# Patient Record
Sex: MALE | Race: WHITE | NOT HISPANIC OR LATINO | URBAN - METROPOLITAN AREA
[De-identification: names, ages, dates, MRNs, and addresses within clinical notes are randomized per-mention and may not be internally consistent; named-entity substitution may affect disease eponyms.]

---

## 2017-03-27 ENCOUNTER — EMERGENCY (EMERGENCY)
Facility: HOSPITAL | Age: 67
LOS: 1 days | Discharge: HOME | End: 2017-03-27
Attending: EMERGENCY MEDICINE | Admitting: EMERGENCY MEDICINE
Payer: OTHER MISCELLANEOUS

## 2017-03-27 VITALS
RESPIRATION RATE: 18 BRPM | SYSTOLIC BLOOD PRESSURE: 180 MMHG | HEART RATE: 61 BPM | OXYGEN SATURATION: 97 % | DIASTOLIC BLOOD PRESSURE: 97 MMHG | TEMPERATURE: 98 F
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
## 2017-03-27 DIAGNOSIS — Z88.0 ALLERGY STATUS TO PENICILLIN: ICD-10-CM

## 2017-03-27 DIAGNOSIS — I10 ESSENTIAL (PRIMARY) HYPERTENSION: ICD-10-CM

## 2017-03-27 DIAGNOSIS — S00.81XA ABRASION OF OTHER PART OF HEAD, INITIAL ENCOUNTER: ICD-10-CM
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
## 2017-03-27 PROCEDURE — 99053 MED SERV 10PM-8AM 24 HR FAC: CPT

## 2017-03-27 PROCEDURE — 99283 EMERGENCY DEPT VISIT LOW MDM: CPT | Mod: 25

## 2017-03-27 RX ORDER — TETANUS TOXOID, REDUCED DIPHTHERIA TOXOID AND ACELLULAR PERTUSSIS VACCINE, ADSORBED 5; 2.5; 8; 8; 2.5 [IU]/.5ML; [IU]/.5ML; UG/.5ML; UG/.5ML; UG/.5ML
0.5 SUSPENSION INTRAMUSCULAR ONCE
Qty: 0 | Refills: 0 | Status: COMPLETED | OUTPATIENT
Start: 2017-03-27 | End: 2017-03-27

## 2017-03-27 RX ORDER — BACITRACIN ZINC 500 UNIT/G
1 OINTMENT IN PACKET (EA) TOPICAL ONCE
Qty: 0 | Refills: 0 | Status: COMPLETED | OUTPATIENT
Start: 2017-03-27 | End: 2017-03-27

## 2017-03-27 RX ADMIN — TETANUS TOXOID, REDUCED DIPHTHERIA TOXOID AND ACELLULAR PERTUSSIS VACCINE, ADSORBED 0.5 MILLILITER(S): 5; 2.5; 8; 8; 2.5 SUSPENSION INTRAMUSCULAR at 00:18

## 2017-03-27 RX ADMIN — Medication 1 APPLICATION(S): at 00:18

## 2017-03-27 NOTE — ED PROVIDER NOTE - OBJECTIVE STATEMENT
patient presents with head injury. while at work patient bent over and hit the toilet, denies fall, LOC, h/a dizziness, blurry vision, neck pain

## 2017-03-27 NOTE — ED PROVIDER NOTE - MEDICAL DECISION MAKING DETAILS
patient with abrasion over the forehead after head injury at work today. bacitracin applied to wound. tdap updated. all precautions reviewed

## 2018-01-02 NOTE — ED ADULT NURSE NOTE - MODE OF DISCHARGE
History     Chief Complaint:    Palpitations     HPI   Jf Chapman is a 15 year old male with a history of Klinefelter syndrome who presents to the emergency department today for evaluation of palpitation. The patient states that since Saturday, he has noted that he had some palpitations on and off. They present themselves suddenly and without any trigger. The patient's mother states that the palpitations seem to start after the patient eats, however, the patient states that his symptoms are not due to his diet.The patient did note to some chest pain and some fevers and chills at the time of the onset of his symptoms but none since. The patient denies any changes in his diet or any other symptoms or concerns. He denies any leg swelling, recent travel, recent immobilization, diarrhea, or vomiting. Of note, the patient was recently seen at Urgent care for these symptoms and had metabolic labs and a chest xray done, which returned normal.     Allergies:  No Known Drug Allergies      Medications:    The patient is currently on no regular medications.      Past Medical History:    Klinefelter syndrome    Past Surgical History:    History reviewed. No pertinent past surgical history.     Family History:    History reviewed. No pertinent family history.      Social History:  The patient was accompanied to the ED by with family.    Review of Systems   Cardiovascular: Positive for chest pain and palpitations.   Gastrointestinal: Positive for nausea. Negative for diarrhea and vomiting.   All other systems reviewed and are negative.    Physical Exam   BP: 115/74 mmHg  Heart Rate: 114   Resp: 20  SpO2: 100% RA  Temp: 98.1  F (oral)     Orthostatics:  Supine- /70, HR 85  Sit- /88,  (dizzy)  Stand - BP 93/55,  (dizzy)     Orthostatics repeat:  Supine - /63, HR 78  Sit - /68, HR 92  Stand - /70, HR 97 (lightheaded)      Physical Exam  General: adult sized teenage male, sitting upright    Eyes: PERRL, Conjunctive within normal limits  ENT: Moist mucous membranes, oropharynx clear.   Neck: No palpable thyromegaly   CV: Normal S1S2, no murmur, rub or gallop. Regular rate and rhythm  Resp: Clear to auscultation bilaterally, no wheezes, rales or rhonchi. Normal respiratory effort.  GI: Abdomen is soft, nontender and nondistended. No palpable masses. No rebound or guarding.  MSK: No edema. Nontender. Normal active range of motion. No calf asymmetry or tenderness to palpation   Skin: Warm and dry. No rashes or lesions or ecchymoses on visible skin.  Neuro: Alert and oriented. Responds appropriately to all questions and commands. No focal findings appreciated. Normal muscle tone.  Psych: Normal mood and affect. Pleasant.   Emergency Department Course   ECG:  @ 1730  Indication: Chest pain   Vent. Rate 96 bpm. DE interval 152 ms. QRS duration 104 ms. QT/QTc 340/429 ms. P-R-T axis 42 75 17.   Normal sinus rhythm. ST elevation, consider early repolarization, pericarditis, or injury. Abnormal ECG.   Read @ 1752 by Dr. Romero.     Laboratory:  CBC:  WBC 7.6, HGB 15.7,   BMP: glucose 110 (H), otherwise WNL (Creatinine 0.87)   Troponin I: <0.015  TSH: 0.51  Myoglobin: 33    Interventions:  (1903) Normal Saline, 1 liter, IV bolus      Emergency Department Course:  Nursing notes and vitals reviewed.  (1759) I performed an exam of the patient as documented above.    EKG was done, interpretation as above.   A peripheral IV was established. Blood was drawn from the patient. This was sent for laboratory testing, findings above.    Patient reassessed. Is feeling improved, with less dizziness with standing. Denies any current palpitations or chest pain.  Findings and plan explained to the patient and family. Patient discharged home with instructions regarding supportive care, medications, and reasons to return. The importance of close follow-up was reviewed.   Impression & Plan    Medical Decision Making:  Jf  ABHAY Chapman is a 15 year old male who presents with concerns for palpitations and dizziness today. He has palpitations for a few days. This is in the setting of mild congestion and possible illness. He is denying any chest pain, although had a short duration of it. EKG did not show any signs of ischemia or injury. Clinically, his presentation does not deem consistent with pericarditis. He had normal cardiac biomarker and recent chest xray which was reported as normal. As his symptoms were not worsened and seemed most consistent with simple palpitations, repeat chest xray was not indicated. Here, he was asymptotic and did not have any abnormalities on laboratory evaluation. I did recommend out patient care and follow up with PCP in 2-3 days. Holter monitor is ordered.  At this time, I feel comfortable discharging home. He and his parents felt comfortable with this plan and he is discharged home in stable condition.      Diagnosis:    ICD-10-CM    1. Palpitations R00.2 Holter set up 48 hrs pediatric   2. Orthostatic dizziness R42        Disposition:  discharged to home    INarcisa, am serving as a scribe on 1/2/2018 at 5:59 PM to personally document services performed by Dr. Romero based on my observations and the provider's statements to me.    1/2/2018   [facility] EMERGENCY DEPARTMENT       Rebekah Romero MD  01/04/18 0031     Ambulatory

## 2019-07-08 ENCOUNTER — OUTPATIENT (OUTPATIENT)
Dept: OUTPATIENT SERVICES | Facility: HOSPITAL | Age: 69
LOS: 1 days | Discharge: HOME | End: 2019-07-08

## 2019-07-08 DIAGNOSIS — F32.3 MAJOR DEPRESSIVE DISORDER, SINGLE EPISODE, SEVERE WITH PSYCHOTIC FEATURES: ICD-10-CM

## 2019-07-09 ENCOUNTER — INPATIENT (INPATIENT)
Facility: HOSPITAL | Age: 69
LOS: 20 days | Discharge: HOME | End: 2019-07-30
Attending: PSYCHIATRY & NEUROLOGY | Admitting: PSYCHIATRY & NEUROLOGY
Payer: MEDICARE

## 2019-07-09 VITALS
OXYGEN SATURATION: 100 % | RESPIRATION RATE: 17 BRPM | HEART RATE: 67 BPM | SYSTOLIC BLOOD PRESSURE: 124 MMHG | DIASTOLIC BLOOD PRESSURE: 72 MMHG | TEMPERATURE: 98 F

## 2019-07-09 PROBLEM — I10 ESSENTIAL (PRIMARY) HYPERTENSION: Chronic | Status: ACTIVE | Noted: 2017-03-27

## 2019-07-09 NOTE — ED ADULT NURSE NOTE - NSIMPLEMENTINTERV_GEN_ALL_ED
Implemented All Universal Safety Interventions:  [place] to call system. Call bell, personal items and telephone within reach. Instruct patient to call for assistance. Room bathroom lighting operational. Non-slip footwear when patient is off stretcher. Physically safe environment: no spills, clutter or unnecessary equipment. Stretcher in lowest position, wheels locked, appropriate side rails in place.

## 2019-07-09 NOTE — ED ADULT NURSE NOTE - CHIEF COMPLAINT QUOTE
Pt c/o SI. Pt states he has had abdominal pain and 37lb weight loss over the past 4 months. Pt states he has seen several doctors and they don't know what's wrong. As per pt wife pt states that he wanted to "jump out the window" [facility] depressive disorder (compliant with medication)

## 2019-07-09 NOTE — ED ADULT NURSE NOTE - OBJECTIVE STATEMENT
Received pt in spot 20A, brought in by family for suicidal thoughts and weight loss of 37 lbs. over past few months.  Pt AAOx3, respirations even and unlabored.  Pt denies N/V/D, denies urinary symptoms.  Pt appears comfortable, but endorses mild abdominal pain.  Pt placed on 1:1 observation for active suicidal thoughts to "jump out the window."  Pt ambulatory from triage to spot 20A.  Wife at bedside, awaiting MD evaluation.

## 2019-07-09 NOTE — ED ADULT TRIAGE NOTE - CHIEF COMPLAINT QUOTE
Pt c/o SI. Pt states he has had abdominal pain and 37lb weight loss over the past 4 months. Pt states he has seen several doctors and they don't know what's wrong. As per pt wife pt states that he wanted to "jump out the window" [facility] depressive disorder (compliant with medication)

## 2019-07-10 DIAGNOSIS — F33.2 MAJOR DEPRESSIVE DISORDER, RECURRENT SEVERE WITHOUT PSYCHOTIC FEATURES: ICD-10-CM

## 2019-07-10 DIAGNOSIS — F41.9 ANXIETY DISORDER, UNSPECIFIED: ICD-10-CM

## 2019-07-10 DIAGNOSIS — Z96.641 PRESENCE OF RIGHT ARTIFICIAL HIP JOINT: Chronic | ICD-10-CM

## 2019-07-10 DIAGNOSIS — Q74.2 OTHER CONGENITAL MALFORMATIONS OF LOWER LIMB(S), INCLUDING PELVIC GIRDLE: Chronic | ICD-10-CM

## 2019-07-10 DIAGNOSIS — F33.9 MAJOR DEPRESSIVE DISORDER, RECURRENT, UNSPECIFIED: ICD-10-CM

## 2019-07-10 LAB
ALBUMIN SERPL ELPH-MCNC: 3.9 G/DL — SIGNIFICANT CHANGE UP (ref 3.3–5)
ALP SERPL-CCNC: 31 U/L — LOW (ref 40–120)
ALT FLD-CCNC: 18 U/L — SIGNIFICANT CHANGE UP (ref 4–41)
AMPHET UR-MCNC: NEGATIVE — SIGNIFICANT CHANGE UP
ANION GAP SERPL CALC-SCNC: 9 MMO/L — SIGNIFICANT CHANGE UP (ref 7–14)
APAP SERPL-MCNC: < 15 UG/ML — LOW (ref 15–25)
APPEARANCE UR: CLEAR — SIGNIFICANT CHANGE UP
AST SERPL-CCNC: 27 U/L — SIGNIFICANT CHANGE UP (ref 4–40)
BARBITURATES UR SCN-MCNC: NEGATIVE — SIGNIFICANT CHANGE UP
BASOPHILS # BLD AUTO: 0.03 K/UL — SIGNIFICANT CHANGE UP (ref 0–0.2)
BASOPHILS NFR BLD AUTO: 0.4 % — SIGNIFICANT CHANGE UP (ref 0–2)
BENZODIAZ UR-MCNC: NEGATIVE — SIGNIFICANT CHANGE UP
BILIRUB SERPL-MCNC: 0.6 MG/DL — SIGNIFICANT CHANGE UP (ref 0.2–1.2)
BILIRUB UR-MCNC: NEGATIVE — SIGNIFICANT CHANGE UP
BLOOD UR QL VISUAL: NEGATIVE — SIGNIFICANT CHANGE UP
BUN SERPL-MCNC: 12 MG/DL — SIGNIFICANT CHANGE UP (ref 7–23)
CALCIUM SERPL-MCNC: 9.2 MG/DL — SIGNIFICANT CHANGE UP (ref 8.4–10.5)
CANNABINOIDS UR-MCNC: NEGATIVE — SIGNIFICANT CHANGE UP
CHLORIDE SERPL-SCNC: 100 MMOL/L — SIGNIFICANT CHANGE UP (ref 98–107)
CO2 SERPL-SCNC: 25 MMOL/L — SIGNIFICANT CHANGE UP (ref 22–31)
COCAINE METAB.OTHER UR-MCNC: NEGATIVE — SIGNIFICANT CHANGE UP
COLOR SPEC: COLORLESS — SIGNIFICANT CHANGE UP
CREAT SERPL-MCNC: 0.98 MG/DL — SIGNIFICANT CHANGE UP (ref 0.5–1.3)
EOSINOPHIL # BLD AUTO: 0.16 K/UL — SIGNIFICANT CHANGE UP (ref 0–0.5)
EOSINOPHIL NFR BLD AUTO: 2.3 % — SIGNIFICANT CHANGE UP (ref 0–6)
ETHANOL BLD-MCNC: < 10 MG/DL — SIGNIFICANT CHANGE UP
GLUCOSE SERPL-MCNC: 94 MG/DL — SIGNIFICANT CHANGE UP (ref 70–99)
GLUCOSE UR-MCNC: NEGATIVE — SIGNIFICANT CHANGE UP
HCT VFR BLD CALC: 36.4 % — LOW (ref 39–50)
HGB BLD-MCNC: 11.8 G/DL — LOW (ref 13–17)
IMM GRANULOCYTES NFR BLD AUTO: 0.1 % — SIGNIFICANT CHANGE UP (ref 0–1.5)
KETONES UR-MCNC: NEGATIVE — SIGNIFICANT CHANGE UP
LEUKOCYTE ESTERASE UR-ACNC: NEGATIVE — SIGNIFICANT CHANGE UP
LYMPHOCYTES # BLD AUTO: 1.49 K/UL — SIGNIFICANT CHANGE UP (ref 1–3.3)
LYMPHOCYTES # BLD AUTO: 21.2 % — SIGNIFICANT CHANGE UP (ref 13–44)
MCHC RBC-ENTMCNC: 29.4 PG — SIGNIFICANT CHANGE UP (ref 27–34)
MCHC RBC-ENTMCNC: 32.4 % — SIGNIFICANT CHANGE UP (ref 32–36)
MCV RBC AUTO: 90.5 FL — SIGNIFICANT CHANGE UP (ref 80–100)
METHADONE UR-MCNC: NEGATIVE — SIGNIFICANT CHANGE UP
MONOCYTES # BLD AUTO: 0.68 K/UL — SIGNIFICANT CHANGE UP (ref 0–0.9)
MONOCYTES NFR BLD AUTO: 9.7 % — SIGNIFICANT CHANGE UP (ref 2–14)
NEUTROPHILS # BLD AUTO: 4.66 K/UL — SIGNIFICANT CHANGE UP (ref 1.8–7.4)
NEUTROPHILS NFR BLD AUTO: 66.3 % — SIGNIFICANT CHANGE UP (ref 43–77)
NITRITE UR-MCNC: NEGATIVE — SIGNIFICANT CHANGE UP
NRBC # FLD: 0 K/UL — SIGNIFICANT CHANGE UP (ref 0–0)
OPIATES UR-MCNC: NEGATIVE — SIGNIFICANT CHANGE UP
OXYCODONE UR-MCNC: NEGATIVE — SIGNIFICANT CHANGE UP
PCP UR-MCNC: NEGATIVE — SIGNIFICANT CHANGE UP
PH UR: 7 — SIGNIFICANT CHANGE UP (ref 5–8)
PLATELET # BLD AUTO: 201 K/UL — SIGNIFICANT CHANGE UP (ref 150–400)
PMV BLD: 10.3 FL — SIGNIFICANT CHANGE UP (ref 7–13)
POTASSIUM SERPL-MCNC: 4.5 MMOL/L — SIGNIFICANT CHANGE UP (ref 3.5–5.3)
POTASSIUM SERPL-SCNC: 4.5 MMOL/L — SIGNIFICANT CHANGE UP (ref 3.5–5.3)
PROT SERPL-MCNC: 6.6 G/DL — SIGNIFICANT CHANGE UP (ref 6–8.3)
PROT UR-MCNC: NEGATIVE — SIGNIFICANT CHANGE UP
RBC # BLD: 4.02 M/UL — LOW (ref 4.2–5.8)
RBC # FLD: 12.9 % — SIGNIFICANT CHANGE UP (ref 10.3–14.5)
RBC CASTS # UR COMP ASSIST: SIGNIFICANT CHANGE UP (ref 0–?)
SALICYLATES SERPL-MCNC: < 5 MG/DL — LOW (ref 15–30)
SODIUM SERPL-SCNC: 134 MMOL/L — LOW (ref 135–145)
SP GR SPEC: 1 — LOW (ref 1–1.04)
TSH SERPL-MCNC: 1.69 UIU/ML — SIGNIFICANT CHANGE UP (ref 0.27–4.2)
UROBILINOGEN FLD QL: NORMAL — SIGNIFICANT CHANGE UP
WBC # BLD: 7.03 K/UL — SIGNIFICANT CHANGE UP (ref 3.8–10.5)
WBC # FLD AUTO: 7.03 K/UL — SIGNIFICANT CHANGE UP (ref 3.8–10.5)
WBC UR QL: SIGNIFICANT CHANGE UP (ref 0–?)

## 2019-07-10 PROCEDURE — 74177 CT ABD & PELVIS W/CONTRAST: CPT | Mod: 26

## 2019-07-10 PROCEDURE — 99285 EMERGENCY DEPT VISIT HI MDM: CPT

## 2019-07-10 PROCEDURE — 71046 X-RAY EXAM CHEST 2 VIEWS: CPT | Mod: 26

## 2019-07-10 RX ORDER — TRAZODONE HCL 50 MG
25 TABLET ORAL AT BEDTIME
Refills: 0 | Status: DISCONTINUED | OUTPATIENT
Start: 2019-07-10 | End: 2019-07-30

## 2019-07-10 RX ORDER — ATORVASTATIN CALCIUM 80 MG/1
20 TABLET, FILM COATED ORAL AT BEDTIME
Refills: 0 | Status: DISCONTINUED | OUTPATIENT
Start: 2019-07-10 | End: 2019-07-30

## 2019-07-10 RX ORDER — LOSARTAN POTASSIUM 100 MG/1
100 TABLET, FILM COATED ORAL DAILY
Refills: 0 | Status: DISCONTINUED | OUTPATIENT
Start: 2019-07-10 | End: 2019-07-10

## 2019-07-10 RX ORDER — SUCRALFATE 1 G
1 TABLET ORAL
Refills: 0 | Status: DISCONTINUED | OUTPATIENT
Start: 2019-07-10 | End: 2019-07-10

## 2019-07-10 RX ORDER — ACETAMINOPHEN 500 MG
975 TABLET ORAL ONCE
Refills: 0 | Status: COMPLETED | OUTPATIENT
Start: 2019-07-10 | End: 2019-07-10

## 2019-07-10 RX ORDER — SODIUM CHLORIDE 9 MG/ML
1000 INJECTION INTRAMUSCULAR; INTRAVENOUS; SUBCUTANEOUS ONCE
Refills: 0 | Status: COMPLETED | OUTPATIENT
Start: 2019-07-10 | End: 2019-07-10

## 2019-07-10 RX ORDER — PANTOPRAZOLE SODIUM 20 MG/1
40 TABLET, DELAYED RELEASE ORAL
Refills: 0 | Status: DISCONTINUED | OUTPATIENT
Start: 2019-07-10 | End: 2019-07-10

## 2019-07-10 RX ORDER — POLYETHYLENE GLYCOL 3350 17 G/17G
17 POWDER, FOR SOLUTION ORAL ONCE
Refills: 0 | Status: COMPLETED | OUTPATIENT
Start: 2019-07-10 | End: 2019-07-10

## 2019-07-10 RX ORDER — MIRTAZAPINE 45 MG/1
15 TABLET, ORALLY DISINTEGRATING ORAL AT BEDTIME
Refills: 0 | Status: DISCONTINUED | OUTPATIENT
Start: 2019-07-10 | End: 2019-07-16

## 2019-07-10 RX ORDER — PANTOPRAZOLE SODIUM 20 MG/1
40 TABLET, DELAYED RELEASE ORAL ONCE
Refills: 0 | Status: COMPLETED | OUTPATIENT
Start: 2019-07-10 | End: 2019-07-10

## 2019-07-10 RX ADMIN — Medication 1 GRAM(S): at 10:19

## 2019-07-10 RX ADMIN — Medication 975 MILLIGRAM(S): at 10:47

## 2019-07-10 RX ADMIN — POLYETHYLENE GLYCOL 3350 17 GRAM(S): 17 POWDER, FOR SOLUTION ORAL at 22:36

## 2019-07-10 RX ADMIN — LOSARTAN POTASSIUM 100 MILLIGRAM(S): 100 TABLET, FILM COATED ORAL at 10:55

## 2019-07-10 RX ADMIN — PANTOPRAZOLE SODIUM 40 MILLIGRAM(S): 20 TABLET, DELAYED RELEASE ORAL at 15:14

## 2019-07-10 RX ADMIN — SODIUM CHLORIDE 1000 MILLILITER(S): 9 INJECTION INTRAMUSCULAR; INTRAVENOUS; SUBCUTANEOUS at 01:26

## 2019-07-10 RX ADMIN — MIRTAZAPINE 15 MILLIGRAM(S): 45 TABLET, ORALLY DISINTEGRATING ORAL at 21:14

## 2019-07-10 RX ADMIN — PANTOPRAZOLE SODIUM 40 MILLIGRAM(S): 20 TABLET, DELAYED RELEASE ORAL at 10:19

## 2019-07-10 RX ADMIN — Medication 975 MILLIGRAM(S): at 06:16

## 2019-07-10 NOTE — ED BEHAVIORAL HEALTH ASSESSMENT NOTE - RISK ASSESSMENT
At this time, the Pt is at high risk for acute suicidality as he has previously expressed active SI with a lethal plan of jumping from a window.  The Pt lives alone, has limited social support; mood component is being compounded by worsening somatic pain.  Pt is feeling hopeless/helpless regarding the situation.  Hence, is a threat to self.

## 2019-07-10 NOTE — ED BEHAVIORAL HEALTH ASSESSMENT NOTE - PAST PSYCHOTROPIC MEDICATION
Lexapro 10mg daily, prozac 10mg daily, Trazodone 50-100mg HS PRN Desipramine (unrecalled dose), Lexapro 10mg daily, Prozac 10mg daily, Trazodone 50-100mg HS PRN

## 2019-07-10 NOTE — ED ADULT NURSE REASSESSMENT NOTE - NS ED NURSE REASSESS COMMENT FT1
Patient received to , report received from main ED RN: Patient is to be seen by psych MD due to recent depression x 1 week, hx depression, 1 hospitalization in 1989 for depression. Patient also has history of taking psych medications to help with depression and sleep but cannot state clearly which medications. He is suicidal "my head hurts so much it makes me want to jump out the window like I almost did this morning." Patient denies headache at present. Patient's voice is very mumbled making it very difficult to understand. He is not homicidal. He believes he is seeing the psychiatrist to help his "headaches and dizziness." Speech is jumbled. Patient makes poor eye contact, flat affect. Admits to feeling anxious at present, but very back and forth with being actively suicidal now that his headache is gone. No acute distress. Denies drug & alcohol use, pending possible admission to [facility]. Will monitor

## 2019-07-10 NOTE — ED PROVIDER NOTE - ATTENDING CONTRIBUTION TO CARE
HPI: 67 y/o male with past medical history significant for depression, hypertension, GERD/hiatal hernia, esophagitis, vitamin D def presents with suicidal ideation.  History obtained from patient's sister at bedside and pt himself.  Sister states that the patient has been healthy until 4 months ago when he started to experience abdominal pain, stomach cramps, hallucinations, insomnia, anxiety, dizziness and recently today has reported suicidal ideation. pt reports no gun access. No previous SI. Denies HI and hallucinations. pt reports the reason why he is SI is because he is sick and nobody can figure out what is wrong with him. in terms of weight loss pt denies night sweats. Reports not eating much due to decreased appetite.   EXAM: NAD, eye EOMI/PERRL, heart RRR, lungs ctab, abd without masses, cachetic, MSK otherwise normal with normal pulses. Skin without rash or signs trauma or track marks.   MDM: concern for weight loss but pt has decreased appetite, not eating much has EGD 1 month ago showing esophagitis and gastritis and hiatal hernia. Possible cuase. No nightsweats or othe signs/symptoms of cancer. Reports has seen multiple MDs for work up and nobdy can figure out what is wrong. Will call PMD Mumtaz Duran, obtain infectious work up and reassess. if medically cleared in ED, will consult Psych for SI.

## 2019-07-10 NOTE — ED BEHAVIORAL HEALTH ASSESSMENT NOTE - DESCRIPTION
been calm and cooperative    Vital Signs Last 24 Hrs  T(C): 36.4 (10 Jul 2019 05:35), Max: 36.6 (09 Jul 2019 22:40)  T(F): 97.6 (10 Jul 2019 05:35), Max: 97.8 (09 Jul 2019 22:40)  HR: 67 (10 Jul 2019 05:35) (53 - 67)  BP: 156/86 (10 Jul 2019 05:35) (124/72 - 156/86)  BP(mean): --  RR: 18 (10 Jul 2019 05:35) (17 - 18)  SpO2: 100% (10 Jul 2019 05:35) (100% - 100%) GERD/ hiatal hernia, HTN, Hypercholesteremia  and Vitamin D deficiency; s/p right hip replacement single, no children, currently no into a relationship retired

## 2019-07-10 NOTE — ED PROVIDER NOTE - CLINICAL SUMMARY MEDICAL DECISION MAKING FREE TEXT BOX
69 y/o male with past medical history significant for depression, hypertension, GERD, vitamin D presents with suicidal ideation. Pt has had weight loss but has been seen and worked up by multiple MDs for this. PMD aware and sent him here for psych eval. Pt has no complaints other than not eating well. Most likely secondary to gerd/gastritis and hiatal hernia. Will obtain labs, imaging and provide IVF and reassess.

## 2019-07-10 NOTE — ED BEHAVIORAL HEALTH ASSESSMENT NOTE - HPI (INCLUDE ILLNESS QUALITY, SEVERITY, DURATION, TIMING, CONTEXT, MODIFYING FACTORS, ASSOCIATED SIGNS AND SYMPTOMS)
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  He has a long hx of depression with 1 remote hospitalization at [facility] in 1989; denied hx of self-injurious behavior/SA and no substance abuse hx.      Pt walked into the ED accompanied by his sister complaining of worsening abdominal pain and headache.  Whilst at the triage, the sister reported that has Pt's symptoms were getting worse and MDs that he previously consulted were unable to determine the cause and offer alleviation of symptoms, Pt felt hopeless, frustrated to a point that he verbalized to her that he wanted to jump out of the window of his apartment.  sister stated that this is the first time Pt made such statements and she began to worry for his safety.      Pt claims he has been experiencing worsening abdominal pain for the past 4 months.  He has been to numerous MDs, tests were done but all showed no significant pathology apart from GERD and hiatal hernia.  He reports being increasingly distraught by this to a point that he has  become increasingly depressed over the past > 2 months.  Pt says he feels hopeless/helpless as there is inability to address and control his symptoms.  Lately, he also began to experience generalized throbbing headaches which at times, were unbearable.  Yesterday, he was awakened from his sleep due to worsening headache.  He told his sister that he would be better off dead if the symptoms continued.      Pt reports of a long hx of depression.  he described his depression as having sad mood daily + anhedonia.  He has not seen a psychiatrist for a long time but had been prescribed by his PCP with Lexapro 10mg daily, Prozac 10mg daily and Trazodone 50-100mg HS PRN.  He says he stopped taking these meds as these were not working towards alleviating his depression.  Lately (for the past 2 months), he reports that depression has worsened; he has low energy level, impaired conc, early/middle/terminal insomnia as well as not eating/drinking much out of fear that he will precipitate another acute abdominal pain.  As a result, he has already lost 37 lbs in 4 months.  Pt began to worry regarding his current medical condition and wonders if he will ever get well again.  He denied experiencing any other specific anxiety disorder symptoms. Pt denies hypomanic/manic symptoms.  He is not paranoid and he denies experiencing any AH/VH    Collateral from sister, Ms Adelina Barry [phone]: reported that Pt has long hx of depression; used to be on desipramine and had been taking this medication since after he got discharged from [facility] in 1989.  Had apparently been doing well and hence, stopped taking the medication x 2 yrs ago.  About 4 months ago, the Pt began to experience severe epigastric pain accompanied by nausea, dizziness and headaches.  Pt sought consult with various MDs/specialist.  Pt verbalized frustration as nobody seemed to figure out what is causing the symptoms.  Pt was prescribed meds but these meds did not help control the symptoms.  Around 2 months back, Pt started experiencing hallucinations in the form of visual and auditory hallucinations (neighbors throwing bowling balls on the floor and Pt hearing it from his apartment unit below).  Sister feels that the hallucinations may be due to the multiple meds that Pt had earlier been prescribed.  Sister is most worried since yesterday as Pt made statement to her that he wanted to "jump out of the window" because Pt could not bear the pains anymore.

## 2019-07-10 NOTE — ED PROVIDER NOTE - CONSTITUTIONAL, MLM
normal... Well appearing, cachetic, awake, alert, oriented to person, place, time/situation and in no apparent distress.

## 2019-07-10 NOTE — ED PROVIDER NOTE - OBJECTIVE STATEMENT
69 y/o male with past medical history significant for depression, hypertension, GERD, vitamin D presents with suicidal ideation.  History obtained from patient's sister at bedside.  Sister states that the patient has been healthy until 4 months ago when he started to experience abdominal pain, stomach cramps, hallucinations, insomnia, anxiety, dizziness and recently today has reported suicidal ideation.

## 2019-07-10 NOTE — ED BEHAVIORAL HEALTH ASSESSMENT NOTE - OTHER
sister CVM abdominal pain and headache in bed; did not attempt to evaluate abdominal pain, headaches

## 2019-07-10 NOTE — ED ADULT NURSE REASSESSMENT NOTE - NS ED NURSE REASSESS COMMENT FT1
Report given to PIYUSH Nava. Pt stable, going to . IV removed, pt in no distress Report given to PIYUSH Nava. Pt stable, going to Behavioral health. IV removed, pt in no distress

## 2019-07-10 NOTE — ED BEHAVIORAL HEALTH ASSESSMENT NOTE - SUICIDE PROTECTIVE FACTORS
Fear of death or dying due to pain/suffering/High spirituality/Positive therapeutic relationships/Identifies reasons for living

## 2019-07-10 NOTE — ED PROVIDER NOTE - PMH
Depression, unspecified depression type    GERD (gastroesophageal reflux disease)    HTN (hypertension)    Hypercholesteremia    Vitamin D deficiency

## 2019-07-10 NOTE — ED PROVIDER NOTE - PROGRESS NOTE DETAILS
Spoke to Dr hernandez, would like to see if pt has reason for admission, if not, Dr hernandez wants pt to see psych for possible SI/ placement. Pt medically cleared, labs unremarkable, CT unremarkable other than possible bladder obstruction from large prostate but is able to provide urine and has no complaints. Will consult Psych for SI.

## 2019-07-11 PROCEDURE — 99223 1ST HOSP IP/OBS HIGH 75: CPT

## 2019-07-11 PROCEDURE — 99222 1ST HOSP IP/OBS MODERATE 55: CPT

## 2019-07-11 RX ORDER — SIMETHICONE 80 MG/1
80 TABLET, CHEWABLE ORAL ONCE
Refills: 0 | Status: COMPLETED | OUTPATIENT
Start: 2019-07-11 | End: 2019-07-11

## 2019-07-11 RX ORDER — FINASTERIDE 5 MG/1
5 TABLET, FILM COATED ORAL DAILY
Refills: 0 | Status: DISCONTINUED | OUTPATIENT
Start: 2019-07-11 | End: 2019-07-30

## 2019-07-11 RX ORDER — SUCRALFATE 1 G
1 TABLET ORAL
Refills: 0 | Status: DISCONTINUED | OUTPATIENT
Start: 2019-07-11 | End: 2019-07-30

## 2019-07-11 RX ORDER — LOSARTAN POTASSIUM 100 MG/1
100 TABLET, FILM COATED ORAL DAILY
Refills: 0 | Status: DISCONTINUED | OUTPATIENT
Start: 2019-07-11 | End: 2019-07-30

## 2019-07-11 RX ORDER — PANTOPRAZOLE SODIUM 20 MG/1
40 TABLET, DELAYED RELEASE ORAL
Refills: 0 | Status: DISCONTINUED | OUTPATIENT
Start: 2019-07-11 | End: 2019-07-30

## 2019-07-11 RX ORDER — QUETIAPINE FUMARATE 200 MG/1
25 TABLET, FILM COATED ORAL
Refills: 0 | Status: DISCONTINUED | OUTPATIENT
Start: 2019-07-11 | End: 2019-07-14

## 2019-07-11 RX ADMIN — Medication 0.5 MILLIGRAM(S): at 16:30

## 2019-07-11 RX ADMIN — Medication 1 GRAM(S): at 08:18

## 2019-07-11 RX ADMIN — LOSARTAN POTASSIUM 100 MILLIGRAM(S): 100 TABLET, FILM COATED ORAL at 08:18

## 2019-07-11 RX ADMIN — QUETIAPINE FUMARATE 25 MILLIGRAM(S): 200 TABLET, FILM COATED ORAL at 20:51

## 2019-07-11 RX ADMIN — QUETIAPINE FUMARATE 25 MILLIGRAM(S): 200 TABLET, FILM COATED ORAL at 10:57

## 2019-07-11 RX ADMIN — ATORVASTATIN CALCIUM 20 MILLIGRAM(S): 80 TABLET, FILM COATED ORAL at 20:51

## 2019-07-11 RX ADMIN — Medication 0.5 MILLIGRAM(S): at 08:32

## 2019-07-11 RX ADMIN — FINASTERIDE 5 MILLIGRAM(S): 5 TABLET, FILM COATED ORAL at 20:51

## 2019-07-11 RX ADMIN — Medication 1 GRAM(S): at 16:29

## 2019-07-11 RX ADMIN — PANTOPRAZOLE SODIUM 40 MILLIGRAM(S): 20 TABLET, DELAYED RELEASE ORAL at 08:18

## 2019-07-11 RX ADMIN — Medication 1 GRAM(S): at 20:51

## 2019-07-11 RX ADMIN — SIMETHICONE 80 MILLIGRAM(S): 80 TABLET, CHEWABLE ORAL at 21:23

## 2019-07-11 RX ADMIN — MIRTAZAPINE 15 MILLIGRAM(S): 45 TABLET, ORALLY DISINTEGRATING ORAL at 20:51

## 2019-07-11 NOTE — CONSULT NOTE ADULT - SUBJECTIVE AND OBJECTIVE BOX
HPI:   Pato Cota is a 68 year old gentlemen with a past medical history of HTN and GERD who was sent to [facility] on  from the ED b/o suicide ideation. The patient has been experiencing unexplained weight loss w/decreased appetite and abdominal pain over the past 4 months. CT scan in the ED yesterday shows stool burden, BPH and urinary retention. The patient states that he hasn't moved his bowels in two days, but is able to urinate regularly.       PAST MEDICAL & SURGICAL HISTORY:  Hypercholesteremia  Depression, unspecified depression type  GERD (gastroesophageal reflux disease)  Vitamin D deficiency  HTN (hypertension)  Abnormality of femur  History of right hip replacement      Review of Systems:   CONSTITUTIONAL: No fever, or fatigue. +Weight loss  EYES: No eye pain, visual disturbances, or discharge  ENMT:  No difficulty hearing, tinnitus, vertigo; No sinus or throat pain  NECK: No pain or stiffness  RESPIRATORY: No cough, wheezing, chills or hemoptysis; No shortness of breath  CARDIOVASCULAR: No chest pain, palpitations, dizziness, or leg swelling  GASTROINTESTINAL:+abdominal pain  GENITOURINARY: No dysuria, frequency, hematuria, or incontinence  NEUROLOGICAL: No headaches, memory loss, loss of strength, numbness, or tremors  SKIN: No itching, burning, rashes, or lesions   LYMPH NODES: No enlarged glands  ENDOCRINE: No heat or cold intolerance; No hair loss  MUSCULOSKELETAL: No joint pain or swelling; No muscle, back, or extremity pain  HEME/LYMPH: No easy bruising, or bleeding gums  ALLERY AND IMMUNOLOGIC: No hives or eczema    Allergies    penicillins (Unknown)    Intolerances        Social History:   Used to work for the [employer], denies drinking, smoking or drug use    FAMILY HISTORY:  No pertinent history    MEDICATIONS  (STANDING):  atorvastatin 20 milliGRAM(s) Oral at bedtime  losartan 100 milliGRAM(s) Oral daily  mirtazapine 15 milliGRAM(s) Oral at bedtime  pantoprazole    Tablet 40 milliGRAM(s) Oral before breakfast  QUEtiapine 25 milliGRAM(s) Oral two times a day  sucralfate 1 Gram(s) Oral four times a day    MEDICATIONS  (PRN):  LORazepam     Tablet 0.5 milliGRAM(s) Oral every 6 hours PRN Agitation  traZODone 25 milliGRAM(s) Oral at bedtime PRN insomnia      Vital Signs Last 24 Hrs  T(C): 36.9 (2019 15:46), Max: 36.9 (2019 15:46)  T(F): 98.4 (2019 15:46), Max: 98.4 (2019 15:46)  HR: --  BP: --  BP(mean): --  RR: --  SpO2: --  CAPILLARY BLOOD GLUCOSE            PHYSICAL EXAM:  GENERAL: NAD, well-developed  HEAD:  Atraumatic, Normocephalic  EYES: EOMI, conjunctiva and sclera clear  NECK: Supple, No JVD  CHEST/LUNG: Clear to auscultation bilaterally; No wheeze  HEART: Regular rate and rhythm; No murmurs, rubs, or gallops  ABDOMEN: Soft, Nontender, Nondistended; Bowel sounds present  EXTREMITIES:  2+ Peripheral Pulses, No clubbing, cyanosis, or edema  NEUROLOGY: non-focal  SKIN: No rashes or lesions    LABS:                        11.8   7.03  )-----------( 201      ( 10 Jul 2019 01:22 )             36.4     07-10    134<L>  |  100  |  12  ----------------------------<  94  4.5   |  25  |  0.98    Ca    9.2      10 Jul 2019 01:22    TPro  6.6  /  Alb  3.9  /  TBili  0.6  /  DBili  x   /  AST  27  /  ALT  18  /  AlkPhos  31<L>  07-10          Urinalysis Basic - ( 10 Jul 2019 01:40 )    Color: COLORLESS / Appearance: CLEAR / S.004 / pH: 7.0  Gluc: NEGATIVE / Ketone: NEGATIVE  / Bili: NEGATIVE / Urobili: NORMAL   Blood: NEGATIVE / Protein: NEGATIVE / Nitrite: NEGATIVE   Leuk Esterase: NEGATIVE / RBC: 0-2 / WBC 0-2   Sq Epi: x / Non Sq Epi: x / Bacteria: x        EKG(personally reviewed):    RADIOLOGY & ADDITIONAL TESTS:    Imaging Personally Reviewed:    CT in the ED shows constipation, distended bladder and bph    Consultant(s) Notes Reviewed:      Care Discussed with Consultants/Other Providers:

## 2019-07-11 NOTE — CONSULT NOTE ADULT - ASSESSMENT
68M  PMH of HTN and GERD who was sent to [facility] on July 10th from the ED b/o suicide ideation. The patient has been experiencing unexplained weight loss w/decreased appetite and abdominal pain over the past 4 months. CT scan in the ED yesterday shows stool burden, BPH and urinary retention. The patient states that he hasn't moved his bowels in two days, but is able to urinate regularly.     Trial of void done at [facility], patient able to urinate, no signs of retention.     #Generalized abdominal pain  -Could be from constipation and occasional urinary retention from possible BPH  Will recommend starting the patient on stool softner/laxatives PRN. -For now Dulcolax ordered PRN  Finasteride ordered for likely BPH  Outpatient follow up with urology for BPH     #GERD  Continue protonix    #HTN  Continue losartan 100 mg daily  Blood pressure will be monitored and if needed medications will be adjusted accordingly    #SI  Continue workup, management and treatment as per primary/psychiatry team

## 2019-07-12 PROCEDURE — 99232 SBSQ HOSP IP/OBS MODERATE 35: CPT

## 2019-07-12 RX ORDER — CHOLECALCIFEROL (VITAMIN D3) 125 MCG
1000 CAPSULE ORAL DAILY
Refills: 0 | Status: DISCONTINUED | OUTPATIENT
Start: 2019-07-12 | End: 2019-07-30

## 2019-07-12 RX ORDER — SIMETHICONE 80 MG/1
80 TABLET, CHEWABLE ORAL ONCE
Refills: 0 | Status: COMPLETED | OUTPATIENT
Start: 2019-07-12 | End: 2019-07-12

## 2019-07-12 RX ADMIN — QUETIAPINE FUMARATE 25 MILLIGRAM(S): 200 TABLET, FILM COATED ORAL at 20:46

## 2019-07-12 RX ADMIN — SIMETHICONE 80 MILLIGRAM(S): 80 TABLET, CHEWABLE ORAL at 06:56

## 2019-07-12 RX ADMIN — Medication 1 GRAM(S): at 07:12

## 2019-07-12 RX ADMIN — ATORVASTATIN CALCIUM 20 MILLIGRAM(S): 80 TABLET, FILM COATED ORAL at 20:46

## 2019-07-12 RX ADMIN — PANTOPRAZOLE SODIUM 40 MILLIGRAM(S): 20 TABLET, DELAYED RELEASE ORAL at 07:12

## 2019-07-12 RX ADMIN — MIRTAZAPINE 15 MILLIGRAM(S): 45 TABLET, ORALLY DISINTEGRATING ORAL at 20:46

## 2019-07-12 RX ADMIN — QUETIAPINE FUMARATE 25 MILLIGRAM(S): 200 TABLET, FILM COATED ORAL at 09:07

## 2019-07-12 RX ADMIN — Medication 1 GRAM(S): at 12:38

## 2019-07-12 RX ADMIN — Medication 1 GRAM(S): at 20:46

## 2019-07-12 RX ADMIN — FINASTERIDE 5 MILLIGRAM(S): 5 TABLET, FILM COATED ORAL at 09:07

## 2019-07-12 RX ADMIN — LOSARTAN POTASSIUM 100 MILLIGRAM(S): 100 TABLET, FILM COATED ORAL at 09:07

## 2019-07-12 RX ADMIN — Medication 1 GRAM(S): at 16:46

## 2019-07-12 RX ADMIN — Medication 0.5 MILLIGRAM(S): at 14:50

## 2019-07-13 PROCEDURE — 99232 SBSQ HOSP IP/OBS MODERATE 35: CPT

## 2019-07-13 RX ORDER — ACETAMINOPHEN 500 MG
650 TABLET ORAL EVERY 6 HOURS
Refills: 0 | Status: DISCONTINUED | OUTPATIENT
Start: 2019-07-13 | End: 2019-07-30

## 2019-07-13 RX ADMIN — Medication 1000 UNIT(S): at 09:43

## 2019-07-13 RX ADMIN — QUETIAPINE FUMARATE 25 MILLIGRAM(S): 200 TABLET, FILM COATED ORAL at 20:49

## 2019-07-13 RX ADMIN — Medication 0.5 MILLIGRAM(S): at 21:32

## 2019-07-13 RX ADMIN — Medication 1 GRAM(S): at 17:14

## 2019-07-13 RX ADMIN — ATORVASTATIN CALCIUM 20 MILLIGRAM(S): 80 TABLET, FILM COATED ORAL at 20:49

## 2019-07-13 RX ADMIN — Medication 650 MILLIGRAM(S): at 20:49

## 2019-07-13 RX ADMIN — Medication 1 GRAM(S): at 09:43

## 2019-07-13 RX ADMIN — Medication 1 GRAM(S): at 20:49

## 2019-07-13 RX ADMIN — FINASTERIDE 5 MILLIGRAM(S): 5 TABLET, FILM COATED ORAL at 09:43

## 2019-07-13 RX ADMIN — Medication 1 TABLET(S): at 09:43

## 2019-07-13 RX ADMIN — MIRTAZAPINE 15 MILLIGRAM(S): 45 TABLET, ORALLY DISINTEGRATING ORAL at 20:49

## 2019-07-13 RX ADMIN — Medication 650 MILLIGRAM(S): at 22:04

## 2019-07-13 RX ADMIN — Medication 650 MILLIGRAM(S): at 15:45

## 2019-07-13 RX ADMIN — LOSARTAN POTASSIUM 100 MILLIGRAM(S): 100 TABLET, FILM COATED ORAL at 09:43

## 2019-07-13 RX ADMIN — QUETIAPINE FUMARATE 25 MILLIGRAM(S): 200 TABLET, FILM COATED ORAL at 09:43

## 2019-07-13 RX ADMIN — Medication 650 MILLIGRAM(S): at 14:58

## 2019-07-13 RX ADMIN — Medication 0.5 MILLIGRAM(S): at 15:10

## 2019-07-13 RX ADMIN — PANTOPRAZOLE SODIUM 40 MILLIGRAM(S): 20 TABLET, DELAYED RELEASE ORAL at 08:20

## 2019-07-14 PROCEDURE — 99231 SBSQ HOSP IP/OBS SF/LOW 25: CPT

## 2019-07-14 RX ORDER — QUETIAPINE FUMARATE 200 MG/1
50 TABLET, FILM COATED ORAL
Refills: 0 | Status: DISCONTINUED | OUTPATIENT
Start: 2019-07-14 | End: 2019-07-17

## 2019-07-14 RX ADMIN — LOSARTAN POTASSIUM 100 MILLIGRAM(S): 100 TABLET, FILM COATED ORAL at 08:46

## 2019-07-14 RX ADMIN — Medication 1 GRAM(S): at 21:19

## 2019-07-14 RX ADMIN — Medication 1000 UNIT(S): at 08:46

## 2019-07-14 RX ADMIN — QUETIAPINE FUMARATE 50 MILLIGRAM(S): 200 TABLET, FILM COATED ORAL at 21:19

## 2019-07-14 RX ADMIN — PANTOPRAZOLE SODIUM 40 MILLIGRAM(S): 20 TABLET, DELAYED RELEASE ORAL at 07:57

## 2019-07-14 RX ADMIN — Medication 25 MILLIGRAM(S): at 00:37

## 2019-07-14 RX ADMIN — Medication 650 MILLIGRAM(S): at 09:45

## 2019-07-14 RX ADMIN — MIRTAZAPINE 15 MILLIGRAM(S): 45 TABLET, ORALLY DISINTEGRATING ORAL at 21:19

## 2019-07-14 RX ADMIN — ATORVASTATIN CALCIUM 20 MILLIGRAM(S): 80 TABLET, FILM COATED ORAL at 21:19

## 2019-07-14 RX ADMIN — Medication 1 GRAM(S): at 08:46

## 2019-07-14 RX ADMIN — Medication 1 GRAM(S): at 12:19

## 2019-07-14 RX ADMIN — Medication 1 TABLET(S): at 08:46

## 2019-07-14 RX ADMIN — Medication 650 MILLIGRAM(S): at 23:05

## 2019-07-14 RX ADMIN — QUETIAPINE FUMARATE 50 MILLIGRAM(S): 200 TABLET, FILM COATED ORAL at 08:46

## 2019-07-14 RX ADMIN — Medication 650 MILLIGRAM(S): at 22:05

## 2019-07-14 RX ADMIN — Medication 650 MILLIGRAM(S): at 08:46

## 2019-07-14 RX ADMIN — FINASTERIDE 5 MILLIGRAM(S): 5 TABLET, FILM COATED ORAL at 08:46

## 2019-07-14 RX ADMIN — Medication 0.5 MILLIGRAM(S): at 13:15

## 2019-07-15 PROCEDURE — 99232 SBSQ HOSP IP/OBS MODERATE 35: CPT

## 2019-07-15 RX ADMIN — Medication 1 TABLET(S): at 08:51

## 2019-07-15 RX ADMIN — MIRTAZAPINE 15 MILLIGRAM(S): 45 TABLET, ORALLY DISINTEGRATING ORAL at 20:11

## 2019-07-15 RX ADMIN — Medication 650 MILLIGRAM(S): at 13:19

## 2019-07-15 RX ADMIN — LOSARTAN POTASSIUM 100 MILLIGRAM(S): 100 TABLET, FILM COATED ORAL at 08:51

## 2019-07-15 RX ADMIN — Medication 0.5 MILLIGRAM(S): at 13:15

## 2019-07-15 RX ADMIN — Medication 650 MILLIGRAM(S): at 13:15

## 2019-07-15 RX ADMIN — Medication 1 GRAM(S): at 20:11

## 2019-07-15 RX ADMIN — Medication 1 GRAM(S): at 13:17

## 2019-07-15 RX ADMIN — ATORVASTATIN CALCIUM 20 MILLIGRAM(S): 80 TABLET, FILM COATED ORAL at 20:11

## 2019-07-15 RX ADMIN — QUETIAPINE FUMARATE 50 MILLIGRAM(S): 200 TABLET, FILM COATED ORAL at 20:11

## 2019-07-15 RX ADMIN — Medication 1 GRAM(S): at 08:51

## 2019-07-15 RX ADMIN — Medication 1 GRAM(S): at 17:17

## 2019-07-15 RX ADMIN — Medication 0.5 MILLIGRAM(S): at 20:08

## 2019-07-15 RX ADMIN — PANTOPRAZOLE SODIUM 40 MILLIGRAM(S): 20 TABLET, DELAYED RELEASE ORAL at 08:51

## 2019-07-15 RX ADMIN — FINASTERIDE 5 MILLIGRAM(S): 5 TABLET, FILM COATED ORAL at 08:49

## 2019-07-15 RX ADMIN — Medication 1000 UNIT(S): at 08:49

## 2019-07-15 RX ADMIN — Medication 25 MILLIGRAM(S): at 23:44

## 2019-07-15 RX ADMIN — QUETIAPINE FUMARATE 50 MILLIGRAM(S): 200 TABLET, FILM COATED ORAL at 08:51

## 2019-07-16 PROCEDURE — 99232 SBSQ HOSP IP/OBS MODERATE 35: CPT

## 2019-07-16 RX ORDER — SIMETHICONE 80 MG/1
80 TABLET, CHEWABLE ORAL
Refills: 0 | Status: DISCONTINUED | OUTPATIENT
Start: 2019-07-16 | End: 2019-07-30

## 2019-07-16 RX ORDER — MIRTAZAPINE 45 MG/1
30 TABLET, ORALLY DISINTEGRATING ORAL AT BEDTIME
Refills: 0 | Status: DISCONTINUED | OUTPATIENT
Start: 2019-07-16 | End: 2019-07-24

## 2019-07-16 RX ADMIN — ATORVASTATIN CALCIUM 20 MILLIGRAM(S): 80 TABLET, FILM COATED ORAL at 20:15

## 2019-07-16 RX ADMIN — Medication 1 GRAM(S): at 12:43

## 2019-07-16 RX ADMIN — Medication 1 GRAM(S): at 20:15

## 2019-07-16 RX ADMIN — Medication 0.5 MILLIGRAM(S): at 16:40

## 2019-07-16 RX ADMIN — LOSARTAN POTASSIUM 100 MILLIGRAM(S): 100 TABLET, FILM COATED ORAL at 08:35

## 2019-07-16 RX ADMIN — Medication 25 MILLIGRAM(S): at 22:53

## 2019-07-16 RX ADMIN — Medication 1 GRAM(S): at 08:35

## 2019-07-16 RX ADMIN — Medication 0.5 MILLIGRAM(S): at 05:53

## 2019-07-16 RX ADMIN — Medication 1000 UNIT(S): at 08:35

## 2019-07-16 RX ADMIN — Medication 1 TABLET(S): at 08:35

## 2019-07-16 RX ADMIN — PANTOPRAZOLE SODIUM 40 MILLIGRAM(S): 20 TABLET, DELAYED RELEASE ORAL at 08:35

## 2019-07-16 RX ADMIN — Medication 0.5 MILLIGRAM(S): at 22:12

## 2019-07-16 RX ADMIN — MIRTAZAPINE 30 MILLIGRAM(S): 45 TABLET, ORALLY DISINTEGRATING ORAL at 20:15

## 2019-07-16 RX ADMIN — Medication 1 GRAM(S): at 16:44

## 2019-07-16 RX ADMIN — FINASTERIDE 5 MILLIGRAM(S): 5 TABLET, FILM COATED ORAL at 08:35

## 2019-07-16 RX ADMIN — QUETIAPINE FUMARATE 50 MILLIGRAM(S): 200 TABLET, FILM COATED ORAL at 20:15

## 2019-07-16 RX ADMIN — QUETIAPINE FUMARATE 50 MILLIGRAM(S): 200 TABLET, FILM COATED ORAL at 08:35

## 2019-07-17 PROCEDURE — 99232 SBSQ HOSP IP/OBS MODERATE 35: CPT

## 2019-07-17 RX ORDER — QUETIAPINE FUMARATE 200 MG/1
50 TABLET, FILM COATED ORAL THREE TIMES A DAY
Refills: 0 | Status: DISCONTINUED | OUTPATIENT
Start: 2019-07-17 | End: 2019-07-30

## 2019-07-17 RX ADMIN — Medication 0.5 MILLIGRAM(S): at 09:11

## 2019-07-17 RX ADMIN — LOSARTAN POTASSIUM 100 MILLIGRAM(S): 100 TABLET, FILM COATED ORAL at 09:11

## 2019-07-17 RX ADMIN — MIRTAZAPINE 30 MILLIGRAM(S): 45 TABLET, ORALLY DISINTEGRATING ORAL at 20:22

## 2019-07-17 RX ADMIN — Medication 1000 UNIT(S): at 09:11

## 2019-07-17 RX ADMIN — Medication 1 GRAM(S): at 16:29

## 2019-07-17 RX ADMIN — Medication 0.5 MILLIGRAM(S): at 20:22

## 2019-07-17 RX ADMIN — PANTOPRAZOLE SODIUM 40 MILLIGRAM(S): 20 TABLET, DELAYED RELEASE ORAL at 07:34

## 2019-07-17 RX ADMIN — Medication 1 GRAM(S): at 12:14

## 2019-07-17 RX ADMIN — Medication 25 MILLIGRAM(S): at 23:32

## 2019-07-17 RX ADMIN — Medication 1 TABLET(S): at 09:11

## 2019-07-17 RX ADMIN — QUETIAPINE FUMARATE 50 MILLIGRAM(S): 200 TABLET, FILM COATED ORAL at 20:22

## 2019-07-17 RX ADMIN — Medication 1 GRAM(S): at 07:34

## 2019-07-17 RX ADMIN — SIMETHICONE 80 MILLIGRAM(S): 80 TABLET, CHEWABLE ORAL at 16:29

## 2019-07-17 RX ADMIN — Medication 1 GRAM(S): at 20:22

## 2019-07-17 RX ADMIN — QUETIAPINE FUMARATE 50 MILLIGRAM(S): 200 TABLET, FILM COATED ORAL at 09:11

## 2019-07-17 RX ADMIN — QUETIAPINE FUMARATE 50 MILLIGRAM(S): 200 TABLET, FILM COATED ORAL at 12:14

## 2019-07-17 RX ADMIN — ATORVASTATIN CALCIUM 20 MILLIGRAM(S): 80 TABLET, FILM COATED ORAL at 20:22

## 2019-07-17 RX ADMIN — FINASTERIDE 5 MILLIGRAM(S): 5 TABLET, FILM COATED ORAL at 09:11

## 2019-07-18 PROCEDURE — 99232 SBSQ HOSP IP/OBS MODERATE 35: CPT

## 2019-07-18 RX ORDER — TRAZODONE HCL 50 MG
50 TABLET ORAL AT BEDTIME
Refills: 0 | Status: DISCONTINUED | OUTPATIENT
Start: 2019-07-18 | End: 2019-07-30

## 2019-07-18 RX ADMIN — LOSARTAN POTASSIUM 100 MILLIGRAM(S): 100 TABLET, FILM COATED ORAL at 08:52

## 2019-07-18 RX ADMIN — ATORVASTATIN CALCIUM 20 MILLIGRAM(S): 80 TABLET, FILM COATED ORAL at 20:24

## 2019-07-18 RX ADMIN — Medication 0.5 MILLIGRAM(S): at 08:33

## 2019-07-18 RX ADMIN — Medication 0.5 MILLIGRAM(S): at 20:24

## 2019-07-18 RX ADMIN — Medication 650 MILLIGRAM(S): at 00:38

## 2019-07-18 RX ADMIN — Medication 650 MILLIGRAM(S): at 02:03

## 2019-07-18 RX ADMIN — Medication 1 GRAM(S): at 16:52

## 2019-07-18 RX ADMIN — Medication 50 MILLIGRAM(S): at 20:23

## 2019-07-18 RX ADMIN — Medication 1 GRAM(S): at 20:23

## 2019-07-18 RX ADMIN — QUETIAPINE FUMARATE 50 MILLIGRAM(S): 200 TABLET, FILM COATED ORAL at 08:52

## 2019-07-18 RX ADMIN — Medication 1 TABLET(S): at 08:52

## 2019-07-18 RX ADMIN — PANTOPRAZOLE SODIUM 40 MILLIGRAM(S): 20 TABLET, DELAYED RELEASE ORAL at 07:48

## 2019-07-18 RX ADMIN — Medication 1000 UNIT(S): at 08:52

## 2019-07-18 RX ADMIN — MIRTAZAPINE 30 MILLIGRAM(S): 45 TABLET, ORALLY DISINTEGRATING ORAL at 20:24

## 2019-07-18 RX ADMIN — SIMETHICONE 80 MILLIGRAM(S): 80 TABLET, CHEWABLE ORAL at 20:23

## 2019-07-18 RX ADMIN — FINASTERIDE 5 MILLIGRAM(S): 5 TABLET, FILM COATED ORAL at 08:52

## 2019-07-18 RX ADMIN — QUETIAPINE FUMARATE 50 MILLIGRAM(S): 200 TABLET, FILM COATED ORAL at 12:16

## 2019-07-18 RX ADMIN — Medication 1 GRAM(S): at 12:16

## 2019-07-18 RX ADMIN — Medication 1 GRAM(S): at 08:02

## 2019-07-18 RX ADMIN — QUETIAPINE FUMARATE 50 MILLIGRAM(S): 200 TABLET, FILM COATED ORAL at 20:23

## 2019-07-19 PROCEDURE — 99232 SBSQ HOSP IP/OBS MODERATE 35: CPT

## 2019-07-19 RX ADMIN — Medication 0.5 MILLIGRAM(S): at 14:15

## 2019-07-19 RX ADMIN — QUETIAPINE FUMARATE 50 MILLIGRAM(S): 200 TABLET, FILM COATED ORAL at 13:11

## 2019-07-19 RX ADMIN — Medication 0.5 MILLIGRAM(S): at 09:24

## 2019-07-19 RX ADMIN — Medication 1 GRAM(S): at 13:11

## 2019-07-19 RX ADMIN — Medication 1 MILLIGRAM(S): at 20:54

## 2019-07-19 RX ADMIN — PANTOPRAZOLE SODIUM 40 MILLIGRAM(S): 20 TABLET, DELAYED RELEASE ORAL at 07:45

## 2019-07-19 RX ADMIN — QUETIAPINE FUMARATE 50 MILLIGRAM(S): 200 TABLET, FILM COATED ORAL at 20:54

## 2019-07-19 RX ADMIN — Medication 1000 UNIT(S): at 09:27

## 2019-07-19 RX ADMIN — SIMETHICONE 80 MILLIGRAM(S): 80 TABLET, CHEWABLE ORAL at 12:20

## 2019-07-19 RX ADMIN — ATORVASTATIN CALCIUM 20 MILLIGRAM(S): 80 TABLET, FILM COATED ORAL at 20:54

## 2019-07-19 RX ADMIN — Medication 1 TABLET(S): at 09:27

## 2019-07-19 RX ADMIN — Medication 1 GRAM(S): at 20:54

## 2019-07-19 RX ADMIN — Medication 50 MILLIGRAM(S): at 20:54

## 2019-07-19 RX ADMIN — QUETIAPINE FUMARATE 50 MILLIGRAM(S): 200 TABLET, FILM COATED ORAL at 09:27

## 2019-07-19 RX ADMIN — Medication 1 GRAM(S): at 17:44

## 2019-07-19 RX ADMIN — Medication 1 GRAM(S): at 09:13

## 2019-07-19 RX ADMIN — LOSARTAN POTASSIUM 100 MILLIGRAM(S): 100 TABLET, FILM COATED ORAL at 09:27

## 2019-07-19 RX ADMIN — FINASTERIDE 5 MILLIGRAM(S): 5 TABLET, FILM COATED ORAL at 09:27

## 2019-07-19 RX ADMIN — MIRTAZAPINE 30 MILLIGRAM(S): 45 TABLET, ORALLY DISINTEGRATING ORAL at 20:54

## 2019-07-20 PROCEDURE — 99232 SBSQ HOSP IP/OBS MODERATE 35: CPT

## 2019-07-20 RX ADMIN — Medication 1 GRAM(S): at 08:47

## 2019-07-20 RX ADMIN — FINASTERIDE 5 MILLIGRAM(S): 5 TABLET, FILM COATED ORAL at 08:47

## 2019-07-20 RX ADMIN — Medication 1000 UNIT(S): at 08:47

## 2019-07-20 RX ADMIN — Medication 1 MILLIGRAM(S): at 21:38

## 2019-07-20 RX ADMIN — Medication 1 TABLET(S): at 08:47

## 2019-07-20 RX ADMIN — LOSARTAN POTASSIUM 100 MILLIGRAM(S): 100 TABLET, FILM COATED ORAL at 08:47

## 2019-07-20 RX ADMIN — PANTOPRAZOLE SODIUM 40 MILLIGRAM(S): 20 TABLET, DELAYED RELEASE ORAL at 08:47

## 2019-07-20 RX ADMIN — Medication 1 GRAM(S): at 21:38

## 2019-07-20 RX ADMIN — ATORVASTATIN CALCIUM 20 MILLIGRAM(S): 80 TABLET, FILM COATED ORAL at 21:38

## 2019-07-20 RX ADMIN — MIRTAZAPINE 30 MILLIGRAM(S): 45 TABLET, ORALLY DISINTEGRATING ORAL at 21:38

## 2019-07-20 RX ADMIN — Medication 1 GRAM(S): at 12:23

## 2019-07-20 RX ADMIN — Medication 1 GRAM(S): at 17:01

## 2019-07-20 RX ADMIN — QUETIAPINE FUMARATE 50 MILLIGRAM(S): 200 TABLET, FILM COATED ORAL at 08:47

## 2019-07-20 RX ADMIN — QUETIAPINE FUMARATE 50 MILLIGRAM(S): 200 TABLET, FILM COATED ORAL at 12:23

## 2019-07-20 RX ADMIN — Medication 50 MILLIGRAM(S): at 21:38

## 2019-07-20 RX ADMIN — Medication 1 MILLIGRAM(S): at 08:11

## 2019-07-20 RX ADMIN — QUETIAPINE FUMARATE 50 MILLIGRAM(S): 200 TABLET, FILM COATED ORAL at 21:38

## 2019-07-21 PROCEDURE — 99232 SBSQ HOSP IP/OBS MODERATE 35: CPT

## 2019-07-21 RX ADMIN — Medication 650 MILLIGRAM(S): at 16:52

## 2019-07-21 RX ADMIN — Medication 1 GRAM(S): at 16:46

## 2019-07-21 RX ADMIN — QUETIAPINE FUMARATE 50 MILLIGRAM(S): 200 TABLET, FILM COATED ORAL at 20:25

## 2019-07-21 RX ADMIN — PANTOPRAZOLE SODIUM 40 MILLIGRAM(S): 20 TABLET, DELAYED RELEASE ORAL at 08:29

## 2019-07-21 RX ADMIN — Medication 50 MILLIGRAM(S): at 22:11

## 2019-07-21 RX ADMIN — QUETIAPINE FUMARATE 50 MILLIGRAM(S): 200 TABLET, FILM COATED ORAL at 13:32

## 2019-07-21 RX ADMIN — Medication 1 TABLET(S): at 08:29

## 2019-07-21 RX ADMIN — Medication 1 GRAM(S): at 12:44

## 2019-07-21 RX ADMIN — QUETIAPINE FUMARATE 50 MILLIGRAM(S): 200 TABLET, FILM COATED ORAL at 08:29

## 2019-07-21 RX ADMIN — LOSARTAN POTASSIUM 100 MILLIGRAM(S): 100 TABLET, FILM COATED ORAL at 08:28

## 2019-07-21 RX ADMIN — Medication 1 MILLIGRAM(S): at 20:25

## 2019-07-21 RX ADMIN — SIMETHICONE 80 MILLIGRAM(S): 80 TABLET, CHEWABLE ORAL at 21:14

## 2019-07-21 RX ADMIN — Medication 1 GRAM(S): at 08:29

## 2019-07-21 RX ADMIN — Medication 1000 UNIT(S): at 08:28

## 2019-07-21 RX ADMIN — FINASTERIDE 5 MILLIGRAM(S): 5 TABLET, FILM COATED ORAL at 08:28

## 2019-07-21 RX ADMIN — Medication 1 GRAM(S): at 20:25

## 2019-07-21 RX ADMIN — Medication 650 MILLIGRAM(S): at 18:05

## 2019-07-21 RX ADMIN — ATORVASTATIN CALCIUM 20 MILLIGRAM(S): 80 TABLET, FILM COATED ORAL at 20:25

## 2019-07-21 RX ADMIN — Medication 1 MILLIGRAM(S): at 08:28

## 2019-07-21 RX ADMIN — MIRTAZAPINE 30 MILLIGRAM(S): 45 TABLET, ORALLY DISINTEGRATING ORAL at 22:10

## 2019-07-22 PROCEDURE — 99232 SBSQ HOSP IP/OBS MODERATE 35: CPT

## 2019-07-22 RX ADMIN — QUETIAPINE FUMARATE 50 MILLIGRAM(S): 200 TABLET, FILM COATED ORAL at 08:50

## 2019-07-22 RX ADMIN — ATORVASTATIN CALCIUM 20 MILLIGRAM(S): 80 TABLET, FILM COATED ORAL at 22:17

## 2019-07-22 RX ADMIN — QUETIAPINE FUMARATE 50 MILLIGRAM(S): 200 TABLET, FILM COATED ORAL at 13:54

## 2019-07-22 RX ADMIN — MIRTAZAPINE 30 MILLIGRAM(S): 45 TABLET, ORALLY DISINTEGRATING ORAL at 22:17

## 2019-07-22 RX ADMIN — QUETIAPINE FUMARATE 50 MILLIGRAM(S): 200 TABLET, FILM COATED ORAL at 22:17

## 2019-07-22 RX ADMIN — Medication 1 GRAM(S): at 16:51

## 2019-07-22 RX ADMIN — Medication 1 GRAM(S): at 12:10

## 2019-07-22 RX ADMIN — Medication 50 MILLIGRAM(S): at 22:17

## 2019-07-22 RX ADMIN — Medication 1 GRAM(S): at 22:17

## 2019-07-22 RX ADMIN — Medication 1 GRAM(S): at 08:50

## 2019-07-22 RX ADMIN — Medication 1 TABLET(S): at 08:50

## 2019-07-22 RX ADMIN — PANTOPRAZOLE SODIUM 40 MILLIGRAM(S): 20 TABLET, DELAYED RELEASE ORAL at 08:50

## 2019-07-22 RX ADMIN — SIMETHICONE 80 MILLIGRAM(S): 80 TABLET, CHEWABLE ORAL at 13:54

## 2019-07-22 RX ADMIN — Medication 1 MILLIGRAM(S): at 22:17

## 2019-07-22 RX ADMIN — Medication 1000 UNIT(S): at 08:49

## 2019-07-22 RX ADMIN — Medication 0.5 MILLIGRAM(S): at 15:22

## 2019-07-22 RX ADMIN — Medication 1 MILLIGRAM(S): at 08:49

## 2019-07-22 RX ADMIN — LOSARTAN POTASSIUM 100 MILLIGRAM(S): 100 TABLET, FILM COATED ORAL at 08:49

## 2019-07-22 RX ADMIN — FINASTERIDE 5 MILLIGRAM(S): 5 TABLET, FILM COATED ORAL at 08:49

## 2019-07-23 PROCEDURE — 99232 SBSQ HOSP IP/OBS MODERATE 35: CPT

## 2019-07-23 RX ADMIN — FINASTERIDE 5 MILLIGRAM(S): 5 TABLET, FILM COATED ORAL at 09:27

## 2019-07-23 RX ADMIN — Medication 1 GRAM(S): at 08:05

## 2019-07-23 RX ADMIN — Medication 1 MILLIGRAM(S): at 09:27

## 2019-07-23 RX ADMIN — Medication 1 TABLET(S): at 09:29

## 2019-07-23 RX ADMIN — Medication 1 GRAM(S): at 22:20

## 2019-07-23 RX ADMIN — Medication 1 GRAM(S): at 12:50

## 2019-07-23 RX ADMIN — QUETIAPINE FUMARATE 50 MILLIGRAM(S): 200 TABLET, FILM COATED ORAL at 22:19

## 2019-07-23 RX ADMIN — MIRTAZAPINE 30 MILLIGRAM(S): 45 TABLET, ORALLY DISINTEGRATING ORAL at 22:19

## 2019-07-23 RX ADMIN — Medication 1 GRAM(S): at 16:39

## 2019-07-23 RX ADMIN — QUETIAPINE FUMARATE 50 MILLIGRAM(S): 200 TABLET, FILM COATED ORAL at 12:49

## 2019-07-23 RX ADMIN — Medication 1 MILLIGRAM(S): at 22:19

## 2019-07-23 RX ADMIN — Medication 50 MILLIGRAM(S): at 22:20

## 2019-07-23 RX ADMIN — QUETIAPINE FUMARATE 50 MILLIGRAM(S): 200 TABLET, FILM COATED ORAL at 09:29

## 2019-07-23 RX ADMIN — PANTOPRAZOLE SODIUM 40 MILLIGRAM(S): 20 TABLET, DELAYED RELEASE ORAL at 08:05

## 2019-07-23 RX ADMIN — ATORVASTATIN CALCIUM 20 MILLIGRAM(S): 80 TABLET, FILM COATED ORAL at 22:19

## 2019-07-23 RX ADMIN — Medication 1000 UNIT(S): at 09:27

## 2019-07-23 RX ADMIN — LOSARTAN POTASSIUM 100 MILLIGRAM(S): 100 TABLET, FILM COATED ORAL at 09:28

## 2019-07-24 PROCEDURE — 99232 SBSQ HOSP IP/OBS MODERATE 35: CPT

## 2019-07-24 RX ORDER — MIRTAZAPINE 45 MG/1
45 TABLET, ORALLY DISINTEGRATING ORAL AT BEDTIME
Refills: 0 | Status: DISCONTINUED | OUTPATIENT
Start: 2019-07-24 | End: 2019-07-30

## 2019-07-24 RX ADMIN — Medication 1 GRAM(S): at 07:34

## 2019-07-24 RX ADMIN — PANTOPRAZOLE SODIUM 40 MILLIGRAM(S): 20 TABLET, DELAYED RELEASE ORAL at 07:34

## 2019-07-24 RX ADMIN — Medication 1 GRAM(S): at 12:10

## 2019-07-24 RX ADMIN — Medication 1 GRAM(S): at 16:02

## 2019-07-24 RX ADMIN — LOSARTAN POTASSIUM 100 MILLIGRAM(S): 100 TABLET, FILM COATED ORAL at 08:40

## 2019-07-24 RX ADMIN — FINASTERIDE 5 MILLIGRAM(S): 5 TABLET, FILM COATED ORAL at 08:39

## 2019-07-24 RX ADMIN — Medication 50 MILLIGRAM(S): at 20:31

## 2019-07-24 RX ADMIN — MIRTAZAPINE 45 MILLIGRAM(S): 45 TABLET, ORALLY DISINTEGRATING ORAL at 20:31

## 2019-07-24 RX ADMIN — ATORVASTATIN CALCIUM 20 MILLIGRAM(S): 80 TABLET, FILM COATED ORAL at 20:31

## 2019-07-24 RX ADMIN — Medication 1 GRAM(S): at 20:31

## 2019-07-24 RX ADMIN — Medication 1 TABLET(S): at 08:40

## 2019-07-24 RX ADMIN — QUETIAPINE FUMARATE 50 MILLIGRAM(S): 200 TABLET, FILM COATED ORAL at 12:10

## 2019-07-24 RX ADMIN — Medication 1 MILLIGRAM(S): at 20:31

## 2019-07-24 RX ADMIN — QUETIAPINE FUMARATE 50 MILLIGRAM(S): 200 TABLET, FILM COATED ORAL at 20:31

## 2019-07-24 RX ADMIN — QUETIAPINE FUMARATE 50 MILLIGRAM(S): 200 TABLET, FILM COATED ORAL at 08:40

## 2019-07-24 RX ADMIN — Medication 1000 UNIT(S): at 08:39

## 2019-07-24 RX ADMIN — Medication 1 MILLIGRAM(S): at 08:40

## 2019-07-25 PROCEDURE — 99232 SBSQ HOSP IP/OBS MODERATE 35: CPT | Mod: GC

## 2019-07-25 RX ADMIN — LOSARTAN POTASSIUM 100 MILLIGRAM(S): 100 TABLET, FILM COATED ORAL at 08:39

## 2019-07-25 RX ADMIN — ATORVASTATIN CALCIUM 20 MILLIGRAM(S): 80 TABLET, FILM COATED ORAL at 21:00

## 2019-07-25 RX ADMIN — Medication 1 MILLIGRAM(S): at 20:49

## 2019-07-25 RX ADMIN — Medication 1 GRAM(S): at 21:00

## 2019-07-25 RX ADMIN — Medication 1 GRAM(S): at 16:18

## 2019-07-25 RX ADMIN — Medication 1 GRAM(S): at 07:18

## 2019-07-25 RX ADMIN — QUETIAPINE FUMARATE 50 MILLIGRAM(S): 200 TABLET, FILM COATED ORAL at 12:02

## 2019-07-25 RX ADMIN — Medication 1 MILLIGRAM(S): at 08:39

## 2019-07-25 RX ADMIN — FINASTERIDE 5 MILLIGRAM(S): 5 TABLET, FILM COATED ORAL at 08:39

## 2019-07-25 RX ADMIN — SIMETHICONE 80 MILLIGRAM(S): 80 TABLET, CHEWABLE ORAL at 16:19

## 2019-07-25 RX ADMIN — QUETIAPINE FUMARATE 50 MILLIGRAM(S): 200 TABLET, FILM COATED ORAL at 08:39

## 2019-07-25 RX ADMIN — Medication 50 MILLIGRAM(S): at 21:00

## 2019-07-25 RX ADMIN — MIRTAZAPINE 45 MILLIGRAM(S): 45 TABLET, ORALLY DISINTEGRATING ORAL at 21:00

## 2019-07-25 RX ADMIN — PANTOPRAZOLE SODIUM 40 MILLIGRAM(S): 20 TABLET, DELAYED RELEASE ORAL at 07:18

## 2019-07-25 RX ADMIN — Medication 1000 UNIT(S): at 08:39

## 2019-07-25 RX ADMIN — Medication 1 TABLET(S): at 08:39

## 2019-07-25 RX ADMIN — Medication 650 MILLIGRAM(S): at 02:08

## 2019-07-25 RX ADMIN — Medication 1 GRAM(S): at 12:03

## 2019-07-25 RX ADMIN — Medication 650 MILLIGRAM(S): at 21:22

## 2019-07-25 RX ADMIN — QUETIAPINE FUMARATE 50 MILLIGRAM(S): 200 TABLET, FILM COATED ORAL at 21:00

## 2019-07-25 RX ADMIN — Medication 650 MILLIGRAM(S): at 23:00

## 2019-07-25 RX ADMIN — Medication 650 MILLIGRAM(S): at 03:08

## 2019-07-26 PROBLEM — E55.9 VITAMIN D DEFICIENCY, UNSPECIFIED: Chronic | Status: ACTIVE | Noted: 2019-07-10

## 2019-07-26 PROBLEM — K21.9 GASTRO-ESOPHAGEAL REFLUX DISEASE WITHOUT ESOPHAGITIS: Chronic | Status: ACTIVE | Noted: 2019-07-10

## 2019-07-26 PROBLEM — E78.00 PURE HYPERCHOLESTEROLEMIA, UNSPECIFIED: Chronic | Status: ACTIVE | Noted: 2019-07-10

## 2019-07-26 PROCEDURE — 99232 SBSQ HOSP IP/OBS MODERATE 35: CPT

## 2019-07-26 RX ORDER — CHOLECALCIFEROL (VITAMIN D3) 125 MCG
1000 CAPSULE ORAL
Qty: 0 | Refills: 0 | DISCHARGE
Start: 2019-07-26

## 2019-07-26 RX ORDER — FINASTERIDE 5 MG/1
1 TABLET, FILM COATED ORAL
Qty: 0 | Refills: 0 | DISCHARGE
Start: 2019-07-26

## 2019-07-26 RX ORDER — QUETIAPINE FUMARATE 200 MG/1
1 TABLET, FILM COATED ORAL
Qty: 0 | Refills: 0 | DISCHARGE
Start: 2019-07-26

## 2019-07-26 RX ORDER — PANTOPRAZOLE SODIUM 20 MG/1
1 TABLET, DELAYED RELEASE ORAL
Qty: 0 | Refills: 0 | DISCHARGE
Start: 2019-07-26

## 2019-07-26 RX ORDER — ATORVASTATIN CALCIUM 80 MG/1
1 TABLET, FILM COATED ORAL
Qty: 0 | Refills: 0 | DISCHARGE
Start: 2019-07-26

## 2019-07-26 RX ORDER — TRAZODONE HCL 50 MG
1 TABLET ORAL
Qty: 0 | Refills: 0 | DISCHARGE
Start: 2019-07-26

## 2019-07-26 RX ORDER — MIRTAZAPINE 45 MG/1
1 TABLET, ORALLY DISINTEGRATING ORAL
Qty: 0 | Refills: 0 | DISCHARGE
Start: 2019-07-26

## 2019-07-26 RX ORDER — SUCRALFATE 1 G
1 TABLET ORAL
Qty: 0 | Refills: 0 | DISCHARGE
Start: 2019-07-26

## 2019-07-26 RX ORDER — LOSARTAN POTASSIUM 100 MG/1
1 TABLET, FILM COATED ORAL
Qty: 0 | Refills: 0 | DISCHARGE
Start: 2019-07-26

## 2019-07-26 RX ADMIN — Medication 1 GRAM(S): at 21:57

## 2019-07-26 RX ADMIN — QUETIAPINE FUMARATE 50 MILLIGRAM(S): 200 TABLET, FILM COATED ORAL at 09:24

## 2019-07-26 RX ADMIN — Medication 1 TABLET(S): at 09:24

## 2019-07-26 RX ADMIN — Medication 1 MILLIGRAM(S): at 09:24

## 2019-07-26 RX ADMIN — Medication 1 MILLIGRAM(S): at 21:58

## 2019-07-26 RX ADMIN — Medication 650 MILLIGRAM(S): at 22:01

## 2019-07-26 RX ADMIN — PANTOPRAZOLE SODIUM 40 MILLIGRAM(S): 20 TABLET, DELAYED RELEASE ORAL at 08:16

## 2019-07-26 RX ADMIN — Medication 50 MILLIGRAM(S): at 21:57

## 2019-07-26 RX ADMIN — MIRTAZAPINE 45 MILLIGRAM(S): 45 TABLET, ORALLY DISINTEGRATING ORAL at 21:57

## 2019-07-26 RX ADMIN — SIMETHICONE 80 MILLIGRAM(S): 80 TABLET, CHEWABLE ORAL at 18:55

## 2019-07-26 RX ADMIN — LOSARTAN POTASSIUM 100 MILLIGRAM(S): 100 TABLET, FILM COATED ORAL at 09:24

## 2019-07-26 RX ADMIN — QUETIAPINE FUMARATE 50 MILLIGRAM(S): 200 TABLET, FILM COATED ORAL at 12:58

## 2019-07-26 RX ADMIN — ATORVASTATIN CALCIUM 20 MILLIGRAM(S): 80 TABLET, FILM COATED ORAL at 21:57

## 2019-07-26 RX ADMIN — FINASTERIDE 5 MILLIGRAM(S): 5 TABLET, FILM COATED ORAL at 09:24

## 2019-07-26 RX ADMIN — QUETIAPINE FUMARATE 50 MILLIGRAM(S): 200 TABLET, FILM COATED ORAL at 21:58

## 2019-07-26 RX ADMIN — Medication 1 GRAM(S): at 17:09

## 2019-07-26 RX ADMIN — Medication 1 GRAM(S): at 08:16

## 2019-07-26 RX ADMIN — Medication 1000 UNIT(S): at 09:24

## 2019-07-26 RX ADMIN — Medication 1 GRAM(S): at 12:58

## 2019-07-27 PROCEDURE — 99232 SBSQ HOSP IP/OBS MODERATE 35: CPT

## 2019-07-27 RX ADMIN — PANTOPRAZOLE SODIUM 40 MILLIGRAM(S): 20 TABLET, DELAYED RELEASE ORAL at 07:23

## 2019-07-27 RX ADMIN — MIRTAZAPINE 45 MILLIGRAM(S): 45 TABLET, ORALLY DISINTEGRATING ORAL at 21:20

## 2019-07-27 RX ADMIN — Medication 1 GRAM(S): at 16:25

## 2019-07-27 RX ADMIN — Medication 1 TABLET(S): at 09:26

## 2019-07-27 RX ADMIN — Medication 1 GRAM(S): at 12:03

## 2019-07-27 RX ADMIN — Medication 1 GRAM(S): at 07:23

## 2019-07-27 RX ADMIN — Medication 1 GRAM(S): at 21:20

## 2019-07-27 RX ADMIN — QUETIAPINE FUMARATE 50 MILLIGRAM(S): 200 TABLET, FILM COATED ORAL at 12:03

## 2019-07-27 RX ADMIN — QUETIAPINE FUMARATE 50 MILLIGRAM(S): 200 TABLET, FILM COATED ORAL at 21:20

## 2019-07-27 RX ADMIN — QUETIAPINE FUMARATE 50 MILLIGRAM(S): 200 TABLET, FILM COATED ORAL at 09:26

## 2019-07-27 RX ADMIN — LOSARTAN POTASSIUM 100 MILLIGRAM(S): 100 TABLET, FILM COATED ORAL at 09:27

## 2019-07-27 RX ADMIN — Medication 1000 UNIT(S): at 09:27

## 2019-07-27 RX ADMIN — FINASTERIDE 5 MILLIGRAM(S): 5 TABLET, FILM COATED ORAL at 09:27

## 2019-07-27 RX ADMIN — Medication 50 MILLIGRAM(S): at 21:20

## 2019-07-27 RX ADMIN — Medication 1 MILLIGRAM(S): at 21:20

## 2019-07-27 RX ADMIN — ATORVASTATIN CALCIUM 20 MILLIGRAM(S): 80 TABLET, FILM COATED ORAL at 21:20

## 2019-07-27 RX ADMIN — Medication 25 MILLIGRAM(S): at 00:33

## 2019-07-28 PROCEDURE — 99232 SBSQ HOSP IP/OBS MODERATE 35: CPT

## 2019-07-28 RX ADMIN — Medication 1 GRAM(S): at 17:20

## 2019-07-28 RX ADMIN — FINASTERIDE 5 MILLIGRAM(S): 5 TABLET, FILM COATED ORAL at 08:06

## 2019-07-28 RX ADMIN — Medication 650 MILLIGRAM(S): at 10:00

## 2019-07-28 RX ADMIN — Medication 650 MILLIGRAM(S): at 14:17

## 2019-07-28 RX ADMIN — Medication 650 MILLIGRAM(S): at 15:21

## 2019-07-28 RX ADMIN — QUETIAPINE FUMARATE 50 MILLIGRAM(S): 200 TABLET, FILM COATED ORAL at 08:06

## 2019-07-28 RX ADMIN — MIRTAZAPINE 45 MILLIGRAM(S): 45 TABLET, ORALLY DISINTEGRATING ORAL at 21:56

## 2019-07-28 RX ADMIN — Medication 1 GRAM(S): at 12:02

## 2019-07-28 RX ADMIN — Medication 1000 UNIT(S): at 08:06

## 2019-07-28 RX ADMIN — Medication 1 GRAM(S): at 21:56

## 2019-07-28 RX ADMIN — Medication 1 TABLET(S): at 08:06

## 2019-07-28 RX ADMIN — QUETIAPINE FUMARATE 50 MILLIGRAM(S): 200 TABLET, FILM COATED ORAL at 21:56

## 2019-07-28 RX ADMIN — Medication 1 MILLIGRAM(S): at 08:06

## 2019-07-28 RX ADMIN — ATORVASTATIN CALCIUM 20 MILLIGRAM(S): 80 TABLET, FILM COATED ORAL at 21:56

## 2019-07-28 RX ADMIN — LOSARTAN POTASSIUM 100 MILLIGRAM(S): 100 TABLET, FILM COATED ORAL at 08:06

## 2019-07-28 RX ADMIN — QUETIAPINE FUMARATE 50 MILLIGRAM(S): 200 TABLET, FILM COATED ORAL at 12:02

## 2019-07-28 RX ADMIN — Medication 1 MILLIGRAM(S): at 21:56

## 2019-07-28 RX ADMIN — Medication 1 GRAM(S): at 08:06

## 2019-07-28 RX ADMIN — Medication 650 MILLIGRAM(S): at 08:06

## 2019-07-28 RX ADMIN — Medication 50 MILLIGRAM(S): at 21:56

## 2019-07-28 RX ADMIN — PANTOPRAZOLE SODIUM 40 MILLIGRAM(S): 20 TABLET, DELAYED RELEASE ORAL at 08:06

## 2019-07-29 RX ADMIN — Medication 1 TABLET(S): at 09:12

## 2019-07-29 RX ADMIN — ATORVASTATIN CALCIUM 20 MILLIGRAM(S): 80 TABLET, FILM COATED ORAL at 21:28

## 2019-07-29 RX ADMIN — Medication 650 MILLIGRAM(S): at 20:07

## 2019-07-29 RX ADMIN — QUETIAPINE FUMARATE 50 MILLIGRAM(S): 200 TABLET, FILM COATED ORAL at 12:25

## 2019-07-29 RX ADMIN — Medication 650 MILLIGRAM(S): at 21:49

## 2019-07-29 RX ADMIN — PANTOPRAZOLE SODIUM 40 MILLIGRAM(S): 20 TABLET, DELAYED RELEASE ORAL at 07:28

## 2019-07-29 RX ADMIN — Medication 1 GRAM(S): at 11:28

## 2019-07-29 RX ADMIN — MIRTAZAPINE 45 MILLIGRAM(S): 45 TABLET, ORALLY DISINTEGRATING ORAL at 21:28

## 2019-07-29 RX ADMIN — Medication 1 GRAM(S): at 07:28

## 2019-07-29 RX ADMIN — Medication 50 MILLIGRAM(S): at 21:28

## 2019-07-29 RX ADMIN — QUETIAPINE FUMARATE 50 MILLIGRAM(S): 200 TABLET, FILM COATED ORAL at 21:28

## 2019-07-29 RX ADMIN — FINASTERIDE 5 MILLIGRAM(S): 5 TABLET, FILM COATED ORAL at 09:12

## 2019-07-29 RX ADMIN — QUETIAPINE FUMARATE 50 MILLIGRAM(S): 200 TABLET, FILM COATED ORAL at 09:12

## 2019-07-29 RX ADMIN — Medication 1 MILLIGRAM(S): at 09:12

## 2019-07-29 RX ADMIN — Medication 1000 UNIT(S): at 09:12

## 2019-07-29 RX ADMIN — Medication 1 GRAM(S): at 21:28

## 2019-07-29 RX ADMIN — Medication 1 MILLIGRAM(S): at 21:28

## 2019-07-29 RX ADMIN — Medication 1 GRAM(S): at 17:24

## 2019-07-29 RX ADMIN — LOSARTAN POTASSIUM 100 MILLIGRAM(S): 100 TABLET, FILM COATED ORAL at 09:12

## 2019-07-30 VITALS — TEMPERATURE: 98 F

## 2019-07-30 RX ADMIN — QUETIAPINE FUMARATE 50 MILLIGRAM(S): 200 TABLET, FILM COATED ORAL at 09:35

## 2019-07-30 RX ADMIN — Medication 1000 UNIT(S): at 09:35

## 2019-07-30 RX ADMIN — Medication 1 MILLIGRAM(S): at 09:35

## 2019-07-30 RX ADMIN — Medication 650 MILLIGRAM(S): at 14:17

## 2019-07-30 RX ADMIN — QUETIAPINE FUMARATE 50 MILLIGRAM(S): 200 TABLET, FILM COATED ORAL at 12:30

## 2019-07-30 RX ADMIN — Medication 1 GRAM(S): at 12:30

## 2019-07-30 RX ADMIN — Medication 650 MILLIGRAM(S): at 15:11

## 2019-07-30 RX ADMIN — Medication 1 TABLET(S): at 09:35

## 2019-07-30 RX ADMIN — Medication 1 GRAM(S): at 09:35

## 2019-07-30 RX ADMIN — PANTOPRAZOLE SODIUM 40 MILLIGRAM(S): 20 TABLET, DELAYED RELEASE ORAL at 06:20

## 2019-07-30 RX ADMIN — LOSARTAN POTASSIUM 100 MILLIGRAM(S): 100 TABLET, FILM COATED ORAL at 09:35

## 2019-07-30 RX ADMIN — FINASTERIDE 5 MILLIGRAM(S): 5 TABLET, FILM COATED ORAL at 09:35

## 2019-08-02 ENCOUNTER — OUTPATIENT (OUTPATIENT)
Dept: OUTPATIENT SERVICES | Facility: HOSPITAL | Age: 69
LOS: 1 days | Discharge: HOME | End: 2019-08-02
Payer: MEDICARE

## 2019-08-02 DIAGNOSIS — Z96.641 PRESENCE OF RIGHT ARTIFICIAL HIP JOINT: Chronic | ICD-10-CM

## 2019-08-02 DIAGNOSIS — Q74.2 OTHER CONGENITAL MALFORMATIONS OF LOWER LIMB(S), INCLUDING PELVIC GIRDLE: Chronic | ICD-10-CM

## 2019-08-05 PROCEDURE — 90792 PSYCH DIAG EVAL W/MED SRVCS: CPT

## 2019-08-26 DIAGNOSIS — F32.3 MAJOR DEPRESSIVE DISORDER, SINGLE EPISODE, SEVERE WITH PSYCHOTIC FEATURES: ICD-10-CM

## 2021-01-21 NOTE — ED ADULT NURSE NOTE - NS ED NURSE DC INFO COMPLEXITY
Procedure To Be Performed At Next Visit: Biopsy by punch method Detail Level: Detailed Introduction Text (Please End With A Colon): The following procedure was deferred: Simple: Patient demonstrates quick and easy understanding

## 2021-03-18 ENCOUNTER — INPATIENT (INPATIENT)
Facility: HOSPITAL | Age: 71
LOS: 66 days | Discharge: NURSING, REHAB OR LONG-TERM CARE | End: 2021-05-24
Attending: PSYCHIATRY & NEUROLOGY | Admitting: PSYCHIATRY & NEUROLOGY
Payer: MEDICARE

## 2021-03-18 VITALS
SYSTOLIC BLOOD PRESSURE: 107 MMHG | HEART RATE: 96 BPM | HEIGHT: 65 IN | TEMPERATURE: 99 F | RESPIRATION RATE: 18 BRPM | OXYGEN SATURATION: 99 % | DIASTOLIC BLOOD PRESSURE: 51 MMHG

## 2021-03-18 DIAGNOSIS — F29 UNSPECIFIED PSYCHOSIS NOT DUE TO A SUBSTANCE OR KNOWN PHYSIOLOGICAL CONDITION: ICD-10-CM

## 2021-03-18 DIAGNOSIS — Z96.641 PRESENCE OF RIGHT ARTIFICIAL HIP JOINT: Chronic | ICD-10-CM

## 2021-03-18 DIAGNOSIS — Q74.2 OTHER CONGENITAL MALFORMATIONS OF LOWER LIMB(S), INCLUDING PELVIC GIRDLE: Chronic | ICD-10-CM

## 2021-03-18 LAB
ALBUMIN SERPL ELPH-MCNC: 4 G/DL — SIGNIFICANT CHANGE UP (ref 3.3–5)
ALP SERPL-CCNC: 42 U/L — SIGNIFICANT CHANGE UP (ref 40–120)
ALT FLD-CCNC: 30 U/L — SIGNIFICANT CHANGE UP (ref 4–41)
ANION GAP SERPL CALC-SCNC: 11 MMOL/L — SIGNIFICANT CHANGE UP (ref 7–14)
APPEARANCE UR: CLEAR — SIGNIFICANT CHANGE UP
AST SERPL-CCNC: 36 U/L — SIGNIFICANT CHANGE UP (ref 4–40)
BASOPHILS # BLD AUTO: 0.04 K/UL — SIGNIFICANT CHANGE UP (ref 0–0.2)
BASOPHILS NFR BLD AUTO: 0.4 % — SIGNIFICANT CHANGE UP (ref 0–2)
BILIRUB SERPL-MCNC: <0.2 MG/DL — SIGNIFICANT CHANGE UP (ref 0.2–1.2)
BILIRUB UR-MCNC: NEGATIVE — SIGNIFICANT CHANGE UP
BUN SERPL-MCNC: 29 MG/DL — HIGH (ref 7–23)
CALCIUM SERPL-MCNC: 9.2 MG/DL — SIGNIFICANT CHANGE UP (ref 8.4–10.5)
CHLORIDE SERPL-SCNC: 105 MMOL/L — SIGNIFICANT CHANGE UP (ref 98–107)
CO2 SERPL-SCNC: 26 MMOL/L — SIGNIFICANT CHANGE UP (ref 22–31)
COLOR SPEC: YELLOW — SIGNIFICANT CHANGE UP
CREAT SERPL-MCNC: 0.69 MG/DL — SIGNIFICANT CHANGE UP (ref 0.5–1.3)
DIFF PNL FLD: NEGATIVE — SIGNIFICANT CHANGE UP
EOSINOPHIL # BLD AUTO: 0.19 K/UL — SIGNIFICANT CHANGE UP (ref 0–0.5)
EOSINOPHIL NFR BLD AUTO: 2 % — SIGNIFICANT CHANGE UP (ref 0–6)
GLUCOSE SERPL-MCNC: 119 MG/DL — HIGH (ref 70–99)
GLUCOSE UR QL: NEGATIVE — SIGNIFICANT CHANGE UP
HCT VFR BLD CALC: 34.4 % — LOW (ref 39–50)
HGB BLD-MCNC: 10.9 G/DL — LOW (ref 13–17)
IANC: 6.87 K/UL — SIGNIFICANT CHANGE UP (ref 1.5–8.5)
IMM GRANULOCYTES NFR BLD AUTO: 0.5 % — SIGNIFICANT CHANGE UP (ref 0–1.5)
KETONES UR-MCNC: NEGATIVE — SIGNIFICANT CHANGE UP
LEUKOCYTE ESTERASE UR-ACNC: NEGATIVE — SIGNIFICANT CHANGE UP
LYMPHOCYTES # BLD AUTO: 1.43 K/UL — SIGNIFICANT CHANGE UP (ref 1–3.3)
LYMPHOCYTES # BLD AUTO: 14.8 % — SIGNIFICANT CHANGE UP (ref 13–44)
MCHC RBC-ENTMCNC: 30 PG — SIGNIFICANT CHANGE UP (ref 27–34)
MCHC RBC-ENTMCNC: 31.7 GM/DL — LOW (ref 32–36)
MCV RBC AUTO: 94.8 FL — SIGNIFICANT CHANGE UP (ref 80–100)
MONOCYTES # BLD AUTO: 1.07 K/UL — HIGH (ref 0–0.9)
MONOCYTES NFR BLD AUTO: 11.1 % — SIGNIFICANT CHANGE UP (ref 2–14)
NEUTROPHILS # BLD AUTO: 6.87 K/UL — SIGNIFICANT CHANGE UP (ref 1.8–7.4)
NEUTROPHILS NFR BLD AUTO: 71.2 % — SIGNIFICANT CHANGE UP (ref 43–77)
NITRITE UR-MCNC: NEGATIVE — SIGNIFICANT CHANGE UP
NRBC # BLD: 0 /100 WBCS — SIGNIFICANT CHANGE UP
NRBC # FLD: 0 K/UL — SIGNIFICANT CHANGE UP
PCP SPEC-MCNC: SIGNIFICANT CHANGE UP
PH UR: 7 — SIGNIFICANT CHANGE UP (ref 5–8)
PLATELET # BLD AUTO: 247 K/UL — SIGNIFICANT CHANGE UP (ref 150–400)
POTASSIUM SERPL-MCNC: 4.2 MMOL/L — SIGNIFICANT CHANGE UP (ref 3.5–5.3)
POTASSIUM SERPL-SCNC: 4.2 MMOL/L — SIGNIFICANT CHANGE UP (ref 3.5–5.3)
PROT SERPL-MCNC: 7 G/DL — SIGNIFICANT CHANGE UP (ref 6–8.3)
PROT UR-MCNC: ABNORMAL
RBC # BLD: 3.63 M/UL — LOW (ref 4.2–5.8)
RBC # FLD: 13.5 % — SIGNIFICANT CHANGE UP (ref 10.3–14.5)
SARS-COV-2 RNA SPEC QL NAA+PROBE: SIGNIFICANT CHANGE UP
SODIUM SERPL-SCNC: 142 MMOL/L — SIGNIFICANT CHANGE UP (ref 135–145)
SP GR SPEC: 1.03 — HIGH (ref 1.01–1.02)
TOXICOLOGY SCREEN, DRUGS OF ABUSE, SERUM RESULT: SIGNIFICANT CHANGE UP
TSH SERPL-MCNC: 3.13 UIU/ML — SIGNIFICANT CHANGE UP (ref 0.27–4.2)
UROBILINOGEN FLD QL: SIGNIFICANT CHANGE UP
WBC # BLD: 9.65 K/UL — SIGNIFICANT CHANGE UP (ref 3.8–10.5)
WBC # FLD AUTO: 9.65 K/UL — SIGNIFICANT CHANGE UP (ref 3.8–10.5)

## 2021-03-18 PROCEDURE — 73130 X-RAY EXAM OF HAND: CPT | Mod: 26,50

## 2021-03-18 PROCEDURE — 70450 CT HEAD/BRAIN W/O DYE: CPT | Mod: 26

## 2021-03-18 PROCEDURE — 73110 X-RAY EXAM OF WRIST: CPT | Mod: 26,50

## 2021-03-18 PROCEDURE — 99285 EMERGENCY DEPT VISIT HI MDM: CPT | Mod: CS

## 2021-03-18 PROCEDURE — 99285 EMERGENCY DEPT VISIT HI MDM: CPT

## 2021-03-18 RX ORDER — LOSARTAN POTASSIUM 100 MG/1
50 TABLET, FILM COATED ORAL DAILY
Refills: 0 | Status: DISCONTINUED | OUTPATIENT
Start: 2021-03-18 | End: 2021-03-18

## 2021-03-18 RX ORDER — FAMOTIDINE 10 MG/ML
20 INJECTION INTRAVENOUS ONCE
Refills: 0 | Status: COMPLETED | OUTPATIENT
Start: 2021-03-18 | End: 2021-03-18

## 2021-03-18 RX ORDER — FOLIC ACID 0.8 MG
1 TABLET ORAL DAILY
Refills: 0 | Status: DISCONTINUED | OUTPATIENT
Start: 2021-03-19 | End: 2021-05-24

## 2021-03-18 RX ORDER — AMLODIPINE BESYLATE 2.5 MG/1
5 TABLET ORAL ONCE
Refills: 0 | Status: COMPLETED | OUTPATIENT
Start: 2021-03-18 | End: 2021-03-18

## 2021-03-18 RX ORDER — MIRTAZAPINE 45 MG/1
22.5 TABLET, ORALLY DISINTEGRATING ORAL AT BEDTIME
Refills: 0 | Status: DISCONTINUED | OUTPATIENT
Start: 2021-03-19 | End: 2021-05-18

## 2021-03-18 RX ORDER — TETANUS TOXOID, REDUCED DIPHTHERIA TOXOID AND ACELLULAR PERTUSSIS VACCINE, ADSORBED 5; 2.5; 8; 8; 2.5 [IU]/.5ML; [IU]/.5ML; UG/.5ML; UG/.5ML; UG/.5ML
0.5 SUSPENSION INTRAMUSCULAR ONCE
Refills: 0 | Status: COMPLETED | OUTPATIENT
Start: 2021-03-18 | End: 2021-03-18

## 2021-03-18 RX ORDER — FINASTERIDE 5 MG/1
5 TABLET, FILM COATED ORAL DAILY
Refills: 0 | Status: DISCONTINUED | OUTPATIENT
Start: 2021-03-19 | End: 2021-05-24

## 2021-03-18 RX ADMIN — AMLODIPINE BESYLATE 5 MILLIGRAM(S): 2.5 TABLET ORAL at 21:51

## 2021-03-18 RX ADMIN — Medication 30 MILLILITER(S): at 19:55

## 2021-03-18 RX ADMIN — FAMOTIDINE 20 MILLIGRAM(S): 10 INJECTION INTRAVENOUS at 19:55

## 2021-03-18 RX ADMIN — TETANUS TOXOID, REDUCED DIPHTHERIA TOXOID AND ACELLULAR PERTUSSIS VACCINE, ADSORBED 0.5 MILLILITER(S): 5; 2.5; 8; 8; 2.5 SUSPENSION INTRAMUSCULAR at 18:16

## 2021-03-18 RX ADMIN — LOSARTAN POTASSIUM 50 MILLIGRAM(S): 100 TABLET, FILM COATED ORAL at 21:52

## 2021-03-18 NOTE — ED PROVIDER NOTE - CARE PLAN
Principal Discharge DX:	Psychosis, unspecified psychosis type  Secondary Diagnosis:	Essential hypertension

## 2021-03-18 NOTE — ED ADULT TRIAGE NOTE - CHIEF COMPLAINT QUOTE
History of depression, GERD, HTN. Patient brought in by EMS from home (lives alone with aide) for possible withdrawal from ativan and zoloft. Currently on Remeron. As per EMS report, sister on scene states patient has been violent and is also has lost ~60 pounds. Patient has repetitive speech in triage.   Adelina (sister): [phone] History of depression, GERD, HTN. Patient brought in by EMS from home (lives alone with aide) for possible withdrawal from ativan and zoloft. Currently on Remeron. As per EMS report, sister on scene states patient has been violent and is also has lost ~60 pounds. Patient has abrasion and bump to forehead and redness to hands. Patient states he banged his head against the wall yesterday, Patient has repetitive speech in triage.   Adelina (sister): [phone] History of depression, GERD, HTN. Patient brought in by EMS from home (lives alone with aide) for possible withdrawal from ativan and zoloft. Currently on Remeron. As per EMS report, sister on scene states patient has been violent and is also has lost ~60 pounds. Patient has abrasion and bump to forehead and redness to hands. Patient states he intentionally banged his head against the wall yesterday. Patient has repetitive speech in triage.   Adelina (sister): [phone] History of depression, GERD, HTN. Patient brought in by EMS from home (lives alone with aide) for possible withdrawal from ativan and zoloft. Currently on Remeron. As per EMS report, sister on scene states patient has been violent and is also has lost ~60 pounds. Patient has abrasion and bump to forehead and redness to hands. Patient states he intentionally banged his head against the wall and punched walls yesterday. Patient has repetitive speech in triage.   Adelina (sister): [phone]

## 2021-03-18 NOTE — ED PROVIDER NOTE - OBJECTIVE STATEMENT
Pt is a 71 y/o M PMHx HTN, HLD, GERD, depression p/w self injurious behavior and aggression today.  Pt states he was brought to the hospital today because he was punching the wall and striking his head against the wall repeatedly.  Pt does not endorse why he was behaving that way.  He sates he ate 20 cookies a few minutes ago after which he has mild epigastric cramping pain, nonradiating.  Pt states he has soreness at dorsal aspect of bilateral hands 2/2 punching a wall.  Pt denies any fevers, chills, nausea, vomiting, chest pain, SOB, palpitations, headache, neck pain, numbness, weakness, use of blood thinners, suicidal ideation, homicidal ideation, visual/auditory hallucinations, illicit drug use, changes in vision/hearing, dizziness, lightheadedness.

## 2021-03-18 NOTE — ED BEHAVIORAL HEALTH ASSESSMENT NOTE - PAST PSYCHOTROPIC MEDICATION
Desipramine (unrecalled dose), Lexapro 10mg daily, Prozac 10mg daily, Trazodone 50-100mg HS PRN, Ativan , Seroquel

## 2021-03-18 NOTE — ED PROVIDER NOTE - EXTREMITY EXAM
+generalized tenderness/redness/swelling at dorsal aspect of bilateral hands and wrists; no point tenderness; no crepitus; FROM; sensation intact to light touch, < 2 sec capillary refill; no snuff box tenderness; no pain with axial load of bilateral thumbs

## 2021-03-18 NOTE — ED PROVIDER NOTE - PHYSICAL EXAMINATION
-Cranial Nerves:  --CN II: PERRLA  --CN III, IV, VI: EOMI b/l  --CN V1-3: Facial sensation intact to touch  --CN VII: No facial asymmetry or droop  --CN VIII: Hearing intact to rubbing fingers b/l  --CN IX, X: Palate elevates symmetrically. Normal phonation  --CN XI: Heading turning and shoulder shrug intact b/l  --CN XII: Tongue midline with normal movements     Normal bilateral FTN.  Rapid alternating movements intact.     +abrasion and swelling at frontal aspect of scalp

## 2021-03-18 NOTE — ED BEHAVIORAL HEALTH ASSESSMENT NOTE - MEDICAL ISSUES AND PLAN (INCLUDE STANDING AND PRN MEDICATION)
FALL RISK , NO SLEEP APNEA . continue home medications : Fenestride 5mg qhs, folic acid  , Vit D 50,000units 2x week.. as per ED  Dr. Vee , may continue Amlodipine 5mg qdaily . Pt received Amllodipine 5mg and Coozar 50mg x dose in ED for elevated BP

## 2021-03-18 NOTE — ED ADULT NURSE REASSESSMENT NOTE - NS ED NURSE REASSESS COMMENT FT1
report received from day RN Katlyn: pt remains on hold, PCA @ bedside, resting in stretcher, psych at bedside for eval

## 2021-03-18 NOTE — ED BEHAVIORAL HEALTH ASSESSMENT NOTE - DETAILS
sister informed MATTY : FREYA PT HAS BEEN THREATENING TO HURT HIS HHA pt denies ANY CURRENT SI, BUT HAVE EXPRESSED SI INT HE PAST IN 2019

## 2021-03-18 NOTE — ED BEHAVIORAL HEALTH ASSESSMENT NOTE - DESCRIPTION
GERD/ hiatal hernia, HTN, Hypercholesteremia  and Vitamin D deficiency; s/p right hip replacement single, no children, currently no into a relationship retired  pt is  calm and cooperative    Vital Signs Last 24 Hrs  T(C): 37.1 (18 Mar 2021 20:02), Max: 37.1 (18 Mar 2021 20:02)  T(F): 98.8 (18 Mar 2021 20:02), Max: 98.8 (18 Mar 2021 20:02)  HR: 71 (18 Mar 2021 21:35) (71 - 96)  BP: 177/85 (18 Mar 2021 21:35) (107/51 - 181/93)  BP(mean): --  RR: 16 (18 Mar 2021 21:35) (16 - 18)  SpO2: 99% (18 Mar 2021 21:35) (98% - 99%)

## 2021-03-18 NOTE — ED ADULT NURSE NOTE - ED STAT RN HANDOFF DETAILS
Patient A&Ox3, aware of plan of care, intermittent hallucinations/ delusions. Report given to [place] at [place] [id number]. EMS transport set up. PT transported with EMS. Belongings returned to EMS and transported with patient.

## 2021-03-18 NOTE — ED ADULT NURSE NOTE - OBJECTIVE STATEMENT
Pt alert x2, repetitive speaking in ED "I ate too many cookies", poor historian. PT arrived with bilateral hand swelling and redness, healing abrasion on forehead no bleeding at this time. As per triage RN, pt has become more aggressive at home, lives with aide caretaker is sister, pt family states he has been hitting walls. Pt complaints of abdominal pain, increased belching" I ate too many cookies. I had 20 cookies". Breathing even unlabored, abdomen soft nontender. History of depression, GERD HTN.  Currently on Remeron.

## 2021-03-18 NOTE — ED BEHAVIORAL HEALTH ASSESSMENT NOTE - RISK ASSESSMENT
Moderate Acute Suicide Risk At this time, the Pt is at high risk for self harm given acute mood sx and impulsivity. pt is not in treatment ,  suboptimal medication regimen, requiring hospitalization for mitigation.

## 2021-03-18 NOTE — ED BEHAVIORAL HEALTH NOTE - BEHAVIORAL HEALTH NOTE
COVID Exposure Screen- collateral (i.e. third-party, chart review, belongings, etc; include EMS and ED staff) Sister   1.	*Has the patient had a COVID-19 test in the last 90 days?  (  ) Yes   (x  ) No   (  ) Unknown- Reason: _____   IF YES PROCEED TO QUESTION #2. IF NO OR UNKNOWN, PLEASE SKIP TO QUESTION #3.   1.	Date of test(s) and result(s): ________   2.	*Has the patient tested positive for COVID-19 antibodies? (  ) Yes   ( x ) No   (  ) Unknown- Reason: _____   IF YES PROCEED TO QUESTION #4. IF NO or UNKNOWN, PLEASE SKIP TO QUESTION #5.   1.	Date of positive antibody test: ________   1.	*Has the patient received 2 doses of the COVID-19 vaccine? (  ) Yes   ( x ) No   (  ) Unknown- Reason: _____   IF YES PROCEED TO QUESTION #6. IF NO or UNKNOWN, PLEASE SKIP TO QUESTION #7.   1.	 Date of second dose: ________   2.	*In the past 10 days, has the patient been around anyone with a positive COVID-19 test?* (  ) Yes   ( x ) No   (  ) Unknown- Reason: __   IF YES PROCEED TO QUESTION #8. IF NO or UNKNOWN, PLEASE SKIP TO QUESTION #13.   1.	Was the patient within 6 feet of them for at least 15 minutes? (  ) Yes   (  ) No   (  ) Unknown- Reason: _____   2.	Did the patient provide care for them? (  ) Yes   (  ) No   (  ) Unknown- Reason: ______   3.	Did the patient have direct physical contact with them (touched, hugged, or kissed them)? (  ) Yes   (  ) No    (  ) Unknown- Reason: __   4.	Did the patient share eating or drinking utensils with them? (  ) Yes   (  ) No    (  ) Unknown- Reason: ____   5.	Did they sneeze, cough, or somehow get respiratory droplets on the patient? (  ) Yes   (  ) No    (  ) Unknown- Reason: ______   1.	*Has the patient been out of [place] within the past 10 days?* (  ) Yes   ( x ) No   (  ) Unknown- Reason: _____   IF YES PLEASE ANSWER THE FOLLOWING QUESTIONS:   1.	Which state/country did they go to? ______   2.	Were they there over 24 hours? (  ) Yes   (  ) No    (  ) Unknown- Reason: ______   3.	Date of return to [facility]: ______ No complaints

## 2021-03-18 NOTE — ED BEHAVIORAL HEALTH ASSESSMENT NOTE - SUMMARY
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  He has a long hx of depression with 2 prior hospitalization at [facility] in 1989 and most recent 07/10/19 followed by [facility] rickey clinic but withdrew after the initial intake ; denied any prior hx of self-injurious behavior until today's presentation no known SA, but has hx of si as per last admission  and no substance abuse hx.  Pt bib ems for aggression towards others and self injurious towards himself.     Patient presents with worsening of depressive sx, becoming more aggressive at home towards HHA and self injurious, banged his head and his hands against the wall. Pt with agitated depression , not controlled on the current medication regimen requires psychiatric admission for safety and stabilization. Pt is agreeable to sign himself in.

## 2021-03-18 NOTE — ED ADULT NURSE NOTE - NS ED NURSE AMBULANCES2
"Chief Complaint   Patient presents with     Bladder Problems     Health Maintenance     Other     bpa       Initial /74 mmHg  Pulse 120  Temp(Src) 98.7  F (37.1  C) (Oral)  Ht 5' 10\" (1.778 m)  Wt 223 lb (101.152 kg)  BMI 32.00 kg/m2  SpO2 97% Estimated body mass index is 32 kg/(m^2) as calculated from the following:    Height as of this encounter: 5' 10\" (1.778 m).    Weight as of this encounter: 223 lb (101.152 kg).  BP completed using cuff size: regular long taken on the right arm  Elisa Chandra CMA      "
[facility]

## 2021-03-18 NOTE — ED PROVIDER NOTE - CLINICAL SUMMARY MEDICAL DECISION MAKING FREE TEXT BOX
Pt is a 69 y/o M PMHx HTN, HLD, GERD, depression p/w self injurious behavior and aggression today -- self injurious behavior, h/o suicidal ideation and depression, r/o ICH, r/o wrist/hand fractures -- xray wrist/hand, ct head

## 2021-03-18 NOTE — ED PROVIDER NOTE - PROGRESS NOTE DETAILS
RHIANNON CORONADO:  Collateral history obtained from sister Adelina [phone].  She states that HHA reported that pt was threatening to harm HHA with a stick.  Adelina reports that pt has had suicidal ideations in the past and that pt has not been able to follow up with a psychiatrist for past year.

## 2021-03-18 NOTE — ED PROVIDER NOTE - ATTENDING CONTRIBUTION TO CARE
I have seen and examined the patient on the patient´s visit date. I have reviewed the note written by Mumtaz Loaiza [facility], on that visit day. I have supervised and participated as necessary in the performance of procedures indicated for patient management and was available at all phases of the patient´s visit when needed. We discussed the history, physical exam findings, management plan, and  medical decision making. I have made my additions, exceptions, and revisions within the chart and I agree with H and P as documented in its entirety. The data and my interpretation of any data collected from labs, interventions and imaging appear below as well as my independent medical decision making and considerations    The patient is a 70y Male who has a past medical and surgery history of Hypercholesteremia Depression, GERD Vitamin D deficiency HTN (hypertension) Abnormality of femur History of right hip replacement   PTED with History of depression, GERD, chronic HTN PTED BIBEMS from home with HHA with abrasions bumps to forehead and trauma to hands as described from striking wall with both for reasons he cannot or will not explain but denies SI/HI or AVH as etiology No other complaints besides areas of impact and stomach pain he attributes to too many cookies.    Vital Signs Last 24 Hrs  T(F): 98.8 HR: 71 BP: 177/85 RR: 16 SpO2: 99% (18 Mar 2021 21:35)  PE: as described; my additions and exceptions are noted in the chart    DATA:  LAB: Pending at time of evaluation                        10.9   9.65  )-----------( 247      ( 18 Mar 2021 17:56 )             34.4   Mean Cell Volume: 94.8 fL (21 @ 17:56) Auto Neutrophil %: 71.2 % (21 @ 17:56)  Auto Eosinophil %: 2.0 % (21 @ 17:56)      142  |  105  |  29<H>  ----------------------------<  119<H>  4.2   |  26  |  0.69    Ca    9.2      18 Mar 2021 17:56    TPro  7.0  /  Alb  4.0  /  TBili  <0.2  /  DBili  x   /  AST  36  /  ALT  30  /  AlkPhos  42       Urinalysis Basic - ( 18 Mar 2021 20:23 )    Color: Yellow / Appearance: Clear / S.032 / pH: x  Gluc: x / Ketone: Negative  / Bili: Negative / Urobili: <2 mg/dL   Blood: x / Protein: Trace / Nitrite: Negative   Leuk Esterase: Negative / RBC: 3 /HPF / WBC 1 /HPF   Sq Epi: x / Non Sq Epi: 0 /HPF / Bacteria: Negative    IMPRESSION/RISK:  Dx=Psychosis NOS   Consideration include  Plan  Collateral obtained from Adelina (sister): [phone] [place]  PsychosisMetabolic workup negative; will follow psych reccs and admit to [facility] under Dr Fady Ortiz  HTN: losartan 50 milliGRAM(s) Oral Patient on record takes 100 but slightly more prerenal on lab work and family states that he has not been taking meds; will add Norvasc to regimen and can follow BP and hydrate PO descision to continue norvasc or dual therapy or other options with Med consult. Continue other outpatient meds  Reassess

## 2021-03-18 NOTE — ED ADULT NURSE NOTE - NSIMPLEMENTINTERV_GEN_ALL_ED
Implemented All Fall Risk Interventions:  [place] to call system. Call bell, personal items and telephone within reach. Instruct patient to call for assistance. Room bathroom lighting operational. Non-slip footwear when patient is off stretcher. Physically safe environment: no spills, clutter or unnecessary equipment. Stretcher in lowest position, wheels locked, appropriate side rails in place. Provide visual cue, wrist band, yellow gown, etc. Monitor gait and stability. Monitor for mental status changes and reorient to person, place, and time. Review medications for side effects contributing to fall risk. Reinforce activity limits and safety measures with patient and family.

## 2021-03-18 NOTE — ED ADULT NURSE NOTE - CHIEF COMPLAINT QUOTE
History of depression, GERD, HTN. Patient brought in by EMS from home (lives alone with aide) for possible withdrawal from ativan and zoloft. Currently on Remeron. As per EMS report, sister on scene states patient has been violent and is also has lost ~60 pounds. Patient has abrasion and bump to forehead and redness to hands. Patient states he intentionally banged his head against the wall and punched walls yesterday. Patient has repetitive speech in triage.   Adelina (sister): [phone]

## 2021-03-18 NOTE — ED BEHAVIORAL HEALTH ASSESSMENT NOTE - HPI (INCLUDE ILLNESS QUALITY, SEVERITY, DURATION, TIMING, CONTEXT, MODIFYING FACTORS, ASSOCIATED SIGNS AND SYMPTOMS)
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  He has a long hx of depression with 2 prior hospitalization at [facility] in 1989 and most recent 07/10/19 followed by [facility] clinic but withdrew after the initial intake ; denied any prior hx of self-injurious behavior until today's presentation no known SA, but has hx of si as per last admission  and no substance abuse hx.  Pt bib ems for aggression towards others and self injurious towards himself.     Patient on assessment is AAOX3 , calm, cooperative but perseverative . Patient is not able to provide a reasonable explanation as to why his both hands are bruised and his forehead , but admits to banging both of his hands and his head against the wall. He keeps repeating "it was stupid of me , I never had this problem before". When trying to explore what made him do it, whether he was angry at himself or at others , or if he did it because he wanted to hurt himself , denied any of the above reasons mentioned. He denies he has si/i/p, stating he did 2 years ago when he was hospitalized. No hi/i/p, although the sister reports otherwise that he has been threatening towards the [facility]. He perseverates about not having these issues before , and that he used to go to the gym , swimming , and now he is not doing these things anymore but not able to explain why. He admits to feeling lonely and depressed, and that his sleep is not good, and that he has been loosing weight although denies poor appetite. He denies any manic or psychotic sx . Pt understands that he has not been functioning well and is agreeable to be admitted psychiatrically.   please see  note for collateral.

## 2021-03-18 NOTE — ED PROVIDER NOTE - NSFOLLOWUPINSTRUCTIONS_ED_ALL_ED_FT
History of depression, GERD, chronic cp HTN. Patient brought in by EMS from home (lives alone with aide) for possible withdrawal from ativan and zoloft. Currently on Remeron. As per EMS report, sister on scene states patient has been violent and is also has lost ~60 pounds. Patient has abrasion and bump to forehead and redness to hands. Patient states he intentionally banged his head against the wall and punched walls yesterday. Patient has repetitive speech in triage.   Adelina (sister): [phone]  [place]  brother [place]  outpatient nsult
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
## 2021-03-19 LAB
CULTURE RESULTS: SIGNIFICANT CHANGE UP
SPECIMEN SOURCE: SIGNIFICANT CHANGE UP

## 2021-03-19 PROCEDURE — 99222 1ST HOSP IP/OBS MODERATE 55: CPT

## 2021-03-19 RX ORDER — TRAZODONE HCL 50 MG
50 TABLET ORAL AT BEDTIME
Refills: 0 | Status: DISCONTINUED | OUTPATIENT
Start: 2021-03-19 | End: 2021-03-19

## 2021-03-19 RX ORDER — RISPERIDONE 4 MG/1
0.12 TABLET ORAL
Refills: 0 | Status: DISCONTINUED | OUTPATIENT
Start: 2021-03-19 | End: 2021-03-21

## 2021-03-19 RX ORDER — AMLODIPINE BESYLATE 2.5 MG/1
5 TABLET ORAL DAILY
Refills: 0 | Status: DISCONTINUED | OUTPATIENT
Start: 2021-03-19 | End: 2021-05-11

## 2021-03-19 RX ORDER — OLANZAPINE 15 MG/1
2.5 TABLET, FILM COATED ORAL EVERY 6 HOURS
Refills: 0 | Status: DISCONTINUED | OUTPATIENT
Start: 2021-03-19 | End: 2021-03-19

## 2021-03-19 RX ORDER — RISPERIDONE 4 MG/1
0.25 TABLET ORAL EVERY 8 HOURS
Refills: 0 | Status: DISCONTINUED | OUTPATIENT
Start: 2021-03-19 | End: 2021-05-24

## 2021-03-19 RX ADMIN — MIRTAZAPINE 22.5 MILLIGRAM(S): 45 TABLET, ORALLY DISINTEGRATING ORAL at 20:33

## 2021-03-19 RX ADMIN — RISPERIDONE 0.12 MILLIGRAM(S): 4 TABLET ORAL at 20:31

## 2021-03-19 RX ADMIN — Medication 30 MILLILITER(S): at 01:14

## 2021-03-19 RX ADMIN — Medication 1 MILLIGRAM(S): at 10:22

## 2021-03-19 RX ADMIN — FINASTERIDE 5 MILLIGRAM(S): 5 TABLET, FILM COATED ORAL at 10:21

## 2021-03-19 RX ADMIN — AMLODIPINE BESYLATE 5 MILLIGRAM(S): 2.5 TABLET ORAL at 10:22

## 2021-03-19 RX ADMIN — OLANZAPINE 2.5 MILLIGRAM(S): 15 TABLET, FILM COATED ORAL at 06:45

## 2021-03-19 NOTE — BH INPATIENT PSYCHIATRY ASSESSMENT NOTE - RECORDS REVIEWED
[FreeTextEntry1] : CAD- MI Hospital chart (includes HIE)

## 2021-03-19 NOTE — BH INPATIENT PSYCHIATRY ASSESSMENT NOTE - CURRENT MEDICATION
MEDICATIONS  (STANDING):  amLODIPine   Tablet 5 milliGRAM(s) Oral daily  finasteride 5 milliGRAM(s) Oral daily  folic acid 1 milliGRAM(s) Oral daily  mirtazapine 22.5 milliGRAM(s) Oral at bedtime  risperiDONE   Tablet 0.125 milliGRAM(s) Oral two times a day    MEDICATIONS  (PRN):

## 2021-03-19 NOTE — PHYSICAL THERAPY INITIAL EVALUATION ADULT - GENERAL OBSERVATIONS, REHAB EVAL
Patient received sitting in day room sitting in trish chair with bruises to UE and Fore. However, patient does not appear to be in any distress.
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
## 2021-03-19 NOTE — PSYCHIATRIC REHAB INITIAL EVALUATION - NSBHPRRECOMMEND_PSY_ALL_CORE
Writer met with patient and introduced self, psychiatric rehabilitation staff and services.  Patient was noted to pleasant but disorganized. He appeared preoccupied often looking at his hands throughout the admission interview. Pt denied SI/HI.  Patient demonstrated fair impulse control during assessment, however, recently engaged in self-injurious behavior (head and hand banging on wall) prior to this admission. Patient displayed poor insight and  poor judgment into his symptoms and treatment at this time.  Writer encouraged patient to attend psychiatric rehabilitation activities and engage in treatment.  Psychiatric Rehabilitation staff will continue to engage patient daily in order to develop therapeutic rapport. Writer and patient were able to establish a collaborative psychiatric rehabilitation goal.

## 2021-03-19 NOTE — PSYCHIATRIC REHAB INITIAL EVALUATION - NSBHPRTOOLBOX_PSY_ALL_CORE
Pt had difficulty identifying current skills. He reports that he used to enjoy swimming and shopping./Family support

## 2021-03-19 NOTE — BH INPATIENT PSYCHIATRY ASSESSMENT NOTE - NSBHASSESSSUMMFT_PSY_ALL_CORE
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     On encounter, patient reports depressive sx, states he banged his head and his hands against the wall, denies current SI/HI, +PMA. Plan: Will continue Mirtazapine at 22.5mg at bedtime and initiate Risperidone at 0.125mg BID as well add Risperidone 0.25mg Q8H PRN for agitation.

## 2021-03-19 NOTE — BH INPATIENT PSYCHIATRY ASSESSMENT NOTE - DETAILS
Pt denies current SI, but has expressed SI in the past (2019). Per sister, pt has been consistently expressing SI over the last few weeks. PT HAS BEEN THREATENING TO HURT HIS HHA

## 2021-03-19 NOTE — BH INPATIENT PSYCHIATRY ASSESSMENT NOTE - NSICDXPASTMEDICALHX_GEN_ALL_CORE_FT
CARDIOLOGY PROGRESS NOTE      ASSESSMENT/PLAN:    1.  Sinus bradycardia with history of AF  -HR improved, 50's at rest and 70's with activity  -continue Amio and low dose metoprolol     2.  CAD  -s/p CABG 2020  -continue medical management, ASA, BB statin  -no plavix as she is on warfarin     3.  Left Pleural effusion  -planning to have IR drain when INR down    4.  RML PE/right renal infarct  -resume coumadin when able  -CTS notes reviewed, plans for heparin gtt post thracentesis.    5.  HTN, variable  Continue low dose metoprolol. Amlodipine remains on hold.    Marie Brown PA-C  [phone]  ----------------------------------------------------------------------------------------------------------------------    HISTORY OF PRESENT ILLNESS: Smiley Robins is a 79 year old female  With a complicated post-op course s/p CABG.      OVERNIGHT EVENTS:   none    SUBJECTIVE:  Denies chest pain.  Breathing improved.        SCHEDULED CARDIAC MEDICATIONS  Aniodarone 200mg daily  ASA 81mg daily  Lipitor 40mg daily  Metoprolol 25mg BID    OBJECTIVE  Vitals 24 Hour Range Last Value    Temp Temp  Min: 97.5 °F (36.4 °C)  Max: 98.3 °F (36.8 °C) 97.5 °F (36.4 °C)   HR Pulse  Min: 49  Max: 60 (!) 50   RR Resp  Min: 16  Max: 20 16   BP BP  Min: 109/55  Max: 146/63 (!) 146/63   Pulse Ox SpO2  Min: 97 %  Max: 100 %     O2 O2 Flow Rate (L/min)  Av L/min  Min: 2 L/min   Min taken time: 20 0800  Max: 2 L/min   Max taken time: 20 0800         Today Admit   Weight 80.8 kg (20 0500) Weight: 84.9 kg (06/10/20 1753)     Weight    06/10/20 1753 20 0521 20 0538 20 0500   Weight: 84.9 kg 83.4 kg 80.7 kg 80.8 kg       INPUT/OUTPUT: -4.4L since admission   CONSTITUTIONAL: No acute distress; well-developed, well-nourished.   NEUROLOGIC/PSYCHIATRIC: sleeping, did not arouset rashes, lesions or bruises. Good turgor  HEENT: Head is normocephalic and atraumatic. Conjunctivae are clear. PERRL. Oral  mucosa are moist.   NECK: Supple, without rigidity. Trachea in midline. No neck or axillary lymphadenopathy. No thyromegaly. Carotids 2+ bilaterally without bruits. No jugular venous distention  RESPIRATORY: Symmetrical lung expansion without accessory muscle use. Lungs are diminished left mid to base posteriorly, otherwise clear  CARDIAC:  S1S2 regular rate and rhythm.    EXTREMITIES: Without clubbing, cyanosis or edema. Pedal and radial pulses palpable. No lower extremity varicosities.   MUSCULOSKELETAL: Overall musculoskeletal tone and strength, and ROM of upper and lower extremities, bilaterally are normal. Back without kyphosis. Gait was not tested.        Recent Labs   Lab 06/13/20  0454 06/12/20  1806 06/12/20  0455 06/11/20  1727 06/11/20  0451 06/10/20  1759 06/10/20  1755   WBC  --   --  8.5  --  7.6  --  9.7   HCT  --   --  28.8*  --  29.0*  --  29.7*   HGB  --   --  9.0*  --  8.8*  --  9.3*   PLT  --   --  301  --  308  --  337   INR 1.6  --  2.2  --  2.3  --  2.3   PTT  --   --   --   --   --   --  36*   SODIUM  --   --  139  --  140  --  138   POTASSIUM  --  4.0 4.3 3.8 4.1  --  4.3   CHLORIDE  --   --  103  --  105  --  103   CO2  --   --  27  --  25  --  25   BUN  --   --  18  --  20  --  23*   CREATININE  --   --  1.04*  --  1.09* 1.30* 1.26*   GLUCOSE  --   --  110*  --  107*  --  117*   RAPDTR  --   --   --   --   --   --  <0.02   NTPROB  --   --   --   --   --   --  5,711*   AST  --   --   --   --   --   --  35   GPT  --   --   --   --   --   --  55   ALKPT  --   --   --   --   --   --  197*   BILIRUBIN  --   --   --   --   --   --  0.8       Telemetry Interpretation: Sinus janelle with 1 degree HB.  HR 50's at rest to 70's with activity      Cardiology addendum  Patient was seen as follow-up in intensive care unit for management of CAD/atrial fibrillation.  Patient denies chest pain, palpitations or dizziness.  No fever chills or night sweats.  Shortness of breath with exertion is present  By exam  awake alert in no distress  HEENT :  Pallor is noted no jaundice  Neck:  Supple no jugular venous distention  Chest:  Breath sounds are decreased on left side base to mid segment  Heart:  Regular rhythm no murmur or pericardial rub  Extremities:  No pretibial edema clubbing or cyanosis SCDs noted  Assessment  Status post CABG doing well  Postop atrial fibrillation:  Presently in sinus rhythm  Left pleural effusion:  Of anticoagulation for thoracentesis  Recommendations:  Thoracentesis later today  Continue amiodarone/beta-blocker/aspirin/statin  Check NT proBNP, may have to add diuretic in low-dose    I have seen and examined the patient.  I have discussed the case in detail with Marie Brown PA-C.  I agree with the assessment and plan as documented.  Blake Wilkinson MD FACC         PAST MEDICAL HISTORY:  GERD (gastroesophageal reflux disease)     HTN (hypertension)     Hypercholesteremia     Vitamin D deficiency

## 2021-03-19 NOTE — BH INPATIENT PSYCHIATRY ASSESSMENT NOTE - NSTXDCOTHRGOAL_PSY_ALL_CORE
Complex. Noncompliant patient will comply with treatment and improve mental status in order to engage effectively with discharge planning

## 2021-03-19 NOTE — BH INPATIENT PSYCHIATRY ASSESSMENT NOTE - NSBHCHARTREVIEWVS_PSY_A_CORE FT
Vital Signs Last 24 Hrs  T(C): 36.3 (19 Mar 2021 15:42), Max: 37.1 (18 Mar 2021 20:02)  T(F): 97.3 (19 Mar 2021 15:42), Max: 98.8 (18 Mar 2021 20:02)  HR: 68 (18 Mar 2021 23:13) (68 - 96)  BP: 158/85 (18 Mar 2021 23:13) (107/51 - 181/93)  BP(mean): --  RR: 18 (19 Mar 2021 01:15) (16 - 18)  SpO2: 97% (19 Mar 2021 01:15) (97% - 99%)

## 2021-03-19 NOTE — PSYCHIATRIC REHAB INITIAL EVALUATION - NSBHALCSUBTREAT_PSY_ALL_CORE
Per medical records, pt private psychiatrist closed his office in March 2020 due to covid 19. Pt is now being prescribed medication by his PCP./None

## 2021-03-19 NOTE — BH INPATIENT PSYCHIATRY ASSESSMENT NOTE - NSCOMMENTSUICRISKFACT_PSY_ALL_CORE
Based on risk assessment evaluation, patient is at low risk of harm to self or others at this time, and DOES NOT require 1:1 CO.

## 2021-03-19 NOTE — BH PATIENT PROFILE - STATED REASON FOR ADMISSION
Pt stated that I could not take any more I was so depressed  ,so I was banging on the wall with my both hand and sustained bruise and hurting me now. Currently pt denies any S/I /P. Also forehead has bruise .pt sister called EMS and brought him for admission.  pt has Medical DX HTN , HDL and GERD, on medication .Pt gait is very unsteady ,remains fall risk  and disorganized .Upon arrival and pre occupied with what he ate in the AM. Pt is monitored closely in the dayroom. pt has eye glass, Both dentures and pt wants to keep in his possession .Explained Hospital policy .
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
## 2021-03-19 NOTE — PHYSICAL THERAPY INITIAL EVALUATION ADULT - DISCHARGE DISPOSITION, PT EVAL
Recommend patient demonstrate all functional transfer and ambulate within unit using RW with supervision./home w/ assist

## 2021-03-19 NOTE — BH INPATIENT PSYCHIATRY ASSESSMENT NOTE - MSE UNSTRUCTURED FT
The patient appears stated age, dressed in hospital gowns. Superficially cooperative with the interview with intermittent eye contact. +PMR. The patient's speech is fluent but soft with some latency. Reported mood "sad" with constricted affect. Thought process ruminative. Thought content: denies any delusions, denies current SI/HI. No perceptual disturbance appreciated. Insight and judgment are poor. Impulse control intact at the time of exam.

## 2021-03-19 NOTE — BH INPATIENT PSYCHIATRY ASSESSMENT NOTE - NSBHMETABOLIC_PSY_ALL_CORE_FT
BMI: BMI (kg/m2): 23.4 (03-19-21 @ 01:15)  HbA1c:   Glucose:   BP: 158/85 (03-18-21 @ 23:13) (107/51 - 181/93)  Lipid Panel:

## 2021-03-19 NOTE — PSYCHIATRIC REHAB INITIAL EVALUATION - NSBHSIGPRIORMED_PSY_ALL_CORE
Pt was initially BIB EMS for aggression towards others and self injurious towards himself./Yes (Specify)

## 2021-03-19 NOTE — BH PATIENT PROFILE - HOME MEDICATIONS
traZODone 50 mg oral tablet , 1 tab(s) orally once a day (at bedtime)  mirtazapine 45 mg oral tablet , 1 tab(s) orally once a day (at bedtime)  finasteride 5 mg oral tablet , 1 tab(s) orally once a day

## 2021-03-19 NOTE — BH INPATIENT PSYCHIATRY ASSESSMENT NOTE - NSTXCOPEGOALOTHER_PSY_ALL_CORE
Pt will be less depressed and will not harm self and will verbalize the concern and feeling to staff.

## 2021-03-19 NOTE — BH INPATIENT PSYCHIATRY ASSESSMENT NOTE - HPI (INCLUDE ILLNESS QUALITY, SEVERITY, DURATION, TIMING, CONTEXT, MODIFYING FACTORS, ASSOCIATED SIGNS AND SYMPTOMS)
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     Per ED assessment: "Patient on assessment is AAOX3 , calm, cooperative but perseverative . Patient is not able to provide a reasonable explanation as to why his both hands are bruised and his forehead , but admits to banging both of his hands and his head against the wall. He keeps repeating "it was stupid of me , I never had this problem before". When trying to explore what made him do it, whether he was angry at himself or at others , or if he did it because he wanted to hurt himself , denied any of the above reasons mentioned. He denies he has si/i/p, stating he did 2 years ago when he was hospitalized. No hi/i/p, although the sister reports otherwise that he has been threatening towards the HHA. He perseverates about not having these issues before , and that he used to go to the gym , swimming , and now he is not doing these things anymore but not able to explain why. He admits to feeling lonely and depressed, and that his sleep is not good, and that he has been loosing weight although denies poor appetite. He denies any manic or psychotic sx . Pt understands that he has not been functioning well and is agreeable to be admitted psychiatrically."    On encounter today, patient is disorganized, reports he is tired and 'sad'; tells me he is here because he hurt his hands and shows me the dorsal surface of both hands. Unable to explain why he hurt self. Denies hearing voices. Denies current SI/HI.  Contact made with sister Adelina who reports that patient has been increasingly unable to take care of self and was placed at an [facility], but after COVID, he came back home and currently has HHAs, but has started to threaten them as well as make suicidal statements. Per sister, patient scheduled to have an outpatient neuropsych eval with Dr. Tineo on 04/06.

## 2021-03-19 NOTE — BH SCALES AND SCREENS - NSBPRSSUSPIC_PSY_ALL_CORE
2 = Very Mild – rare instances of distrustfulness which may or may not be warranted by the situation

## 2021-03-19 NOTE — PSYCHIATRIC REHAB INITIAL EVALUATION - NSBHSTRENGTHHOME_PSY_ALL_CORE
Pt reports having a home health aide several times a week who assist with ADL tasks including meal preparation.

## 2021-03-19 NOTE — BH INPATIENT PSYCHIATRY ASSESSMENT NOTE - RISK ASSESSMENT
At this time, the Pt is at high risk for self harm given acute mood sx and impulsivity. pt is not in treatment ,  suboptimal medication regimen, requiring hospitalization for mitigation.

## 2021-03-20 PROCEDURE — 99232 SBSQ HOSP IP/OBS MODERATE 35: CPT

## 2021-03-20 RX ADMIN — RISPERIDONE 0.12 MILLIGRAM(S): 4 TABLET ORAL at 20:37

## 2021-03-20 RX ADMIN — RISPERIDONE 0.12 MILLIGRAM(S): 4 TABLET ORAL at 10:18

## 2021-03-20 RX ADMIN — Medication 1 MILLIGRAM(S): at 10:23

## 2021-03-20 RX ADMIN — FINASTERIDE 5 MILLIGRAM(S): 5 TABLET, FILM COATED ORAL at 10:20

## 2021-03-20 RX ADMIN — MIRTAZAPINE 22.5 MILLIGRAM(S): 45 TABLET, ORALLY DISINTEGRATING ORAL at 20:36

## 2021-03-20 RX ADMIN — AMLODIPINE BESYLATE 5 MILLIGRAM(S): 2.5 TABLET ORAL at 10:20

## 2021-03-20 NOTE — BH INPATIENT PSYCHIATRY PROGRESS NOTE - NSBHMETABOLIC_PSY_ALL_CORE_FT
BMI: BMI (kg/m2): 23.4 (03-19-21 @ 01:15)  HbA1c:   Glucose:   BP: 112/71 (03-20-21 @ 07:49) (107/51 - 181/93)  Lipid Panel:

## 2021-03-20 NOTE — BH INPATIENT PSYCHIATRY PROGRESS NOTE - CURRENT MEDICATION
MEDICATIONS  (STANDING):  amLODIPine   Tablet 5 milliGRAM(s) Oral daily  finasteride 5 milliGRAM(s) Oral daily  folic acid 1 milliGRAM(s) Oral daily  mirtazapine 22.5 milliGRAM(s) Oral at bedtime  risperiDONE   Tablet 0.125 milliGRAM(s) Oral two times a day    MEDICATIONS  (PRN):  LORazepam     Tablet 0.5 milliGRAM(s) Oral two times a day PRN Agitation  risperiDONE   Tablet 0.25 milliGRAM(s) Oral every 8 hours PRN Agitation

## 2021-03-20 NOTE — BH INPATIENT PSYCHIATRY PROGRESS NOTE - PRN MEDS
MEDICATIONS  (PRN):  LORazepam     Tablet 0.5 milliGRAM(s) Oral two times a day PRN Agitation  risperiDONE   Tablet 0.25 milliGRAM(s) Oral every 8 hours PRN Agitation

## 2021-03-20 NOTE — BH INPATIENT PSYCHIATRY PROGRESS NOTE - MSE UNSTRUCTURED FT
The patient appears stated age, dressed in hospital gowns. He is withdrawn and isolative. Superficially cooperative with the interview with intermittent eye contact. +PMR. The patient's speech is fluent but soft with some latency. Reported mood "sad" with constricted affect. Thought process ruminative. Thought content: denies any delusions, denies current SI/HI. No perceptual disturbance appreciated. Insight and judgment are poor. Impulse control intact at the time of exam.

## 2021-03-21 PROCEDURE — 99232 SBSQ HOSP IP/OBS MODERATE 35: CPT

## 2021-03-21 RX ORDER — RISPERIDONE 4 MG/1
0.25 TABLET ORAL
Refills: 0 | Status: DISCONTINUED | OUTPATIENT
Start: 2021-03-21 | End: 2021-03-22

## 2021-03-21 RX ADMIN — RISPERIDONE 0.25 MILLIGRAM(S): 4 TABLET ORAL at 20:26

## 2021-03-21 RX ADMIN — FINASTERIDE 5 MILLIGRAM(S): 5 TABLET, FILM COATED ORAL at 08:23

## 2021-03-21 RX ADMIN — Medication 1 MILLIGRAM(S): at 08:23

## 2021-03-21 RX ADMIN — RISPERIDONE 0.25 MILLIGRAM(S): 4 TABLET ORAL at 08:23

## 2021-03-21 RX ADMIN — Medication 0.5 MILLIGRAM(S): at 10:42

## 2021-03-21 RX ADMIN — AMLODIPINE BESYLATE 5 MILLIGRAM(S): 2.5 TABLET ORAL at 08:23

## 2021-03-21 RX ADMIN — MIRTAZAPINE 22.5 MILLIGRAM(S): 45 TABLET, ORALLY DISINTEGRATING ORAL at 20:25

## 2021-03-21 NOTE — BH INPATIENT PSYCHIATRY PROGRESS NOTE - MSE UNSTRUCTURED FT
The patient appears stated age, dressed in hospital gowns. He is withdrawn and isolative. Superficially cooperative with the interview with intermittent eye contact. +PMR. The pt. has been acting bizarrely playing with his feces in the day room. The patient's speech is fluent but soft with some latency. Reported mood "sad" with constricted affect. Thought process ruminative. Thought content: denies any delusions, denies current SI/HI. No perceptual disturbance appreciated. Insight and judgment are poor. Impulse control intact at the time of exam.

## 2021-03-21 NOTE — BH INPATIENT PSYCHIATRY PROGRESS NOTE - CURRENT MEDICATION
MEDICATIONS  (STANDING):  amLODIPine   Tablet 5 milliGRAM(s) Oral daily  finasteride 5 milliGRAM(s) Oral daily  folic acid 1 milliGRAM(s) Oral daily  mirtazapine 22.5 milliGRAM(s) Oral at bedtime  risperiDONE   Tablet 0.25 milliGRAM(s) Oral two times a day    MEDICATIONS  (PRN):  LORazepam     Tablet 0.5 milliGRAM(s) Oral two times a day PRN Agitation  risperiDONE   Tablet 0.25 milliGRAM(s) Oral every 8 hours PRN Agitation

## 2021-03-22 PROCEDURE — 99232 SBSQ HOSP IP/OBS MODERATE 35: CPT

## 2021-03-22 RX ORDER — RISPERIDONE 4 MG/1
0.25 TABLET ORAL THREE TIMES A DAY
Refills: 0 | Status: DISCONTINUED | OUTPATIENT
Start: 2021-03-22 | End: 2021-04-07

## 2021-03-22 RX ADMIN — RISPERIDONE 0.25 MILLIGRAM(S): 4 TABLET ORAL at 20:24

## 2021-03-22 RX ADMIN — RISPERIDONE 0.25 MILLIGRAM(S): 4 TABLET ORAL at 08:36

## 2021-03-22 RX ADMIN — AMLODIPINE BESYLATE 5 MILLIGRAM(S): 2.5 TABLET ORAL at 08:37

## 2021-03-22 RX ADMIN — Medication 1 MILLIGRAM(S): at 08:37

## 2021-03-22 RX ADMIN — MIRTAZAPINE 22.5 MILLIGRAM(S): 45 TABLET, ORALLY DISINTEGRATING ORAL at 20:24

## 2021-03-22 RX ADMIN — FINASTERIDE 5 MILLIGRAM(S): 5 TABLET, FILM COATED ORAL at 08:36

## 2021-03-22 NOTE — BH INPATIENT PSYCHIATRY PROGRESS NOTE - CURRENT MEDICATION
MEDICATIONS  (STANDING):  amLODIPine   Tablet 5 milliGRAM(s) Oral daily  finasteride 5 milliGRAM(s) Oral daily  folic acid 1 milliGRAM(s) Oral daily  mirtazapine 22.5 milliGRAM(s) Oral at bedtime  risperiDONE   Tablet 0.25 milliGRAM(s) Oral three times a day    MEDICATIONS  (PRN):  aluminum hydroxide/magnesium hydroxide/simethicone Suspension 30 milliLiter(s) Oral every 4 hours PRN Dyspepsia  LORazepam     Tablet 0.5 milliGRAM(s) Oral two times a day PRN Agitation  risperiDONE   Tablet 0.25 milliGRAM(s) Oral every 8 hours PRN Agitation

## 2021-03-22 NOTE — BH INPATIENT PSYCHIATRY PROGRESS NOTE - MSE UNSTRUCTURED FT
The patient appears stated age, dressed in hospital gowns. Superficially cooperative with the interview with intermittent eye contact. No PMA/ PMR noted. The patient's speech is fluent but soft with some latency. Reported mood "okay" with constricted affect. Thought process ruminative. Thought content: denies any delusions, denies current SI/HI. No perceptual disturbance appreciated. Insight and judgment are poor. Impulse control intact at the time of exam (but was playing with feces this weekend).

## 2021-03-22 NOTE — BH INPATIENT PSYCHIATRY PROGRESS NOTE - NSBHFUPINTERVALHXFT_PSY_A_CORE
Patient seen for depression and behavioral disturbance. Chart reviewed and case discussed with interdisciplinary staff. Per staff, patient often bizarre, was playing with his feces yesterday. On encounter today, patient is calm but superficially cooperative; reports stomach pain, denies nausea, states he has not had a BM, does not respond when I inquire about the incident over the weekend. Appetite and sleep are adequate. Patient denies SI/HI.

## 2021-03-22 NOTE — BH INPATIENT PSYCHIATRY PROGRESS NOTE - NSBHCHARTREVIEWVS_PSY_A_CORE FT
Vital Signs Last 24 Hrs  T(C): 36.1 (22 Mar 2021 08:06), Max: 37.1 (21 Mar 2021 15:53)  T(F): 97 (22 Mar 2021 08:06), Max: 98.8 (21 Mar 2021 15:53)  HR: 65 (22 Mar 2021 08:06) (65 - 65)  BP: 145/72 (22 Mar 2021 08:06) (145/72 - 145/72)  RR: 16 (22 Mar 2021 08:06) (16 - 18)

## 2021-03-22 NOTE — BH INPATIENT PSYCHIATRY PROGRESS NOTE - PRN MEDS
MEDICATIONS  (PRN):  aluminum hydroxide/magnesium hydroxide/simethicone Suspension 30 milliLiter(s) Oral every 4 hours PRN Dyspepsia  LORazepam     Tablet 0.5 milliGRAM(s) Oral two times a day PRN Agitation  risperiDONE   Tablet 0.25 milliGRAM(s) Oral every 8 hours PRN Agitation

## 2021-03-22 NOTE — BH INPATIENT PSYCHIATRY PROGRESS NOTE - NSBHASSESSSUMMFT_PSY_ALL_CORE
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     03/22: On encounter, patient reports minimally engaging with interview, withdrawn and isolative. Plan: Will continue Mirtazapine at 22.5mg at bedtime and  Risperidone to 0.25mg TID for psychosis.

## 2021-03-23 LAB
24R-OH-CALCIDIOL SERPL-MCNC: 39.4 NG/ML — SIGNIFICANT CHANGE UP (ref 30–80)
A1C WITH ESTIMATED AVERAGE GLUCOSE RESULT: 5.5 % — SIGNIFICANT CHANGE UP (ref 4–5.6)
CHOLEST SERPL-MCNC: 172 MG/DL — SIGNIFICANT CHANGE UP
ESTIMATED AVERAGE GLUCOSE: 111 MG/DL — SIGNIFICANT CHANGE UP (ref 68–114)
FOLATE SERPL-MCNC: 20 NG/ML — HIGH (ref 3.1–17.5)
HDLC SERPL-MCNC: 60 MG/DL — SIGNIFICANT CHANGE UP
LIPID PNL WITH DIRECT LDL SERPL: 100 MG/DL — HIGH
NON HDL CHOLESTEROL: 112 MG/DL — SIGNIFICANT CHANGE UP
T PALLIDUM AB TITR SER: NEGATIVE — SIGNIFICANT CHANGE UP
TRIGL SERPL-MCNC: 60 MG/DL — SIGNIFICANT CHANGE UP
VIT B12 SERPL-MCNC: 928 PG/ML — HIGH (ref 200–900)

## 2021-03-23 PROCEDURE — 99232 SBSQ HOSP IP/OBS MODERATE 35: CPT

## 2021-03-23 RX ADMIN — MIRTAZAPINE 22.5 MILLIGRAM(S): 45 TABLET, ORALLY DISINTEGRATING ORAL at 20:41

## 2021-03-23 RX ADMIN — RISPERIDONE 0.25 MILLIGRAM(S): 4 TABLET ORAL at 09:15

## 2021-03-23 RX ADMIN — AMLODIPINE BESYLATE 5 MILLIGRAM(S): 2.5 TABLET ORAL at 09:15

## 2021-03-23 RX ADMIN — Medication 0.5 MILLIGRAM(S): at 01:36

## 2021-03-23 RX ADMIN — RISPERIDONE 0.25 MILLIGRAM(S): 4 TABLET ORAL at 12:50

## 2021-03-23 RX ADMIN — RISPERIDONE 0.25 MILLIGRAM(S): 4 TABLET ORAL at 20:41

## 2021-03-23 RX ADMIN — FINASTERIDE 5 MILLIGRAM(S): 5 TABLET, FILM COATED ORAL at 09:15

## 2021-03-23 RX ADMIN — Medication 1 MILLIGRAM(S): at 09:16

## 2021-03-23 NOTE — BH INPATIENT PSYCHIATRY PROGRESS NOTE - NSTXHEALTHGOALOTHER_PSY_ALL_CORE
Pt blood pressure ,HDL and GERD will be WNL and will feel better
Pt blood pressure, HDL and GERD will be WNL and will feel better

## 2021-03-23 NOTE — BH INPATIENT PSYCHIATRY PROGRESS NOTE - NSBHMETABOLIC_PSY_ALL_CORE_FT
BMI: BMI (kg/m2): 23.4 (03-19-21 @ 01:15)  HbA1c: A1C with Estimated Average Glucose Result: 5.5 % (03-23-21 @ 09:42)   BP: 145/72 (03-22-21 @ 08:06) (145/72 - 145/72)  Lipid Panel: Date/Time: 03-23-21 @ 09:42  Cholesterol, Serum: 172  HDL Cholesterol, Serum: 60  Triglycerides, Serum: 60

## 2021-03-23 NOTE — BH INPATIENT PSYCHIATRY PROGRESS NOTE - NSBHASSESSSUMMFT_PSY_ALL_CORE
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     03/23: On encounter, patient reports minimally engaging with interview, denies any concern. BP elevated at 150/96. Lipid profile and HbA1 c from this morning are unremarkable. Plan: Will continue Mirtazapine at 22.5mg at bedtime and Risperidone 0.25mg TID. Will continue to monitor BP closely.

## 2021-03-23 NOTE — BH INPATIENT PSYCHIATRY PROGRESS NOTE - NSBHCHARTREVIEWVS_PSY_A_CORE FT
Vital Signs Last 24 Hrs  T(C): 36.7 (23 Mar 2021 08:19), Max: 37.2 (22 Mar 2021 15:50)  T(F): 98 (23 Mar 2021 08:19), Max: 98.9 (22 Mar 2021 15:50)  Orthostatic VS  03-23-21 @ 08:19  Sitting BP: 150/96 HR: 67  Mode: electronic

## 2021-03-23 NOTE — BH INPATIENT PSYCHIATRY PROGRESS NOTE - MSE UNSTRUCTURED FT
The patient appears stated age, dressed in hospital gowns. Superficially cooperative with the interview with intermittent eye contact. No PMA/ PMR noted. The patient's speech is fluent but soft with some latency. Reported mood "okay" with constricted affect. Thought process ruminative. Thought content: denies any delusions, denies current SI/HI. No perceptual disturbance appreciated. Insight and judgment are poor. Impulse control intact at the time of exam.

## 2021-03-23 NOTE — BH INPATIENT PSYCHIATRY PROGRESS NOTE - NSBHFUPINTERVALHXFT_PSY_A_CORE
Patient seen for depression and behavioral disturbance. Chart reviewed and case discussed with interdisciplinary staff. Per staff, pt is less disorganized. On encounter today, patient is calm but remains superficially cooperative with intermittent eye contact; states he is okay, has 'no problem'. Per staff, pt's appetite and sleep are adequate. Patient denies SI/HI. He is med compliant.

## 2021-03-24 PROCEDURE — 99232 SBSQ HOSP IP/OBS MODERATE 35: CPT

## 2021-03-24 RX ADMIN — MIRTAZAPINE 22.5 MILLIGRAM(S): 45 TABLET, ORALLY DISINTEGRATING ORAL at 20:52

## 2021-03-24 RX ADMIN — FINASTERIDE 5 MILLIGRAM(S): 5 TABLET, FILM COATED ORAL at 08:34

## 2021-03-24 RX ADMIN — RISPERIDONE 0.25 MILLIGRAM(S): 4 TABLET ORAL at 20:53

## 2021-03-24 RX ADMIN — RISPERIDONE 0.25 MILLIGRAM(S): 4 TABLET ORAL at 08:34

## 2021-03-24 RX ADMIN — RISPERIDONE 0.25 MILLIGRAM(S): 4 TABLET ORAL at 14:13

## 2021-03-24 RX ADMIN — Medication 1 MILLIGRAM(S): at 08:33

## 2021-03-24 RX ADMIN — AMLODIPINE BESYLATE 5 MILLIGRAM(S): 2.5 TABLET ORAL at 08:34

## 2021-03-24 NOTE — BH INPATIENT PSYCHIATRY PROGRESS NOTE - NSBHASSESSSUMMFT_PSY_ALL_CORE
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     03/24: On encounter, patient reports minimally engaging with interview, denies any concern. Per staff, in behavioral control, appetite and sleep are adequate. BP wnl. Plan: Will continue Mirtazapine at 22.5mg at bedtime and Risperidone 0.25mg TID.

## 2021-03-24 NOTE — BH INPATIENT PSYCHIATRY PROGRESS NOTE - NSBHCHARTREVIEWVS_PSY_A_CORE FT
Vital Signs Last 24 Hrs  T(C): 36.6 (24 Mar 2021 08:11), Max: 36.8 (23 Mar 2021 19:43)  T(F): 97.8 (24 Mar 2021 08:11), Max: 98.2 (23 Mar 2021 19:43)  HR: 71 (24 Mar 2021 08:11) (71 - 71)  BP: 138/63 (24 Mar 2021 08:11) (138/63 - 138/63)  RR: 18 (24 Mar 2021 08:11) (18 - 18)

## 2021-03-24 NOTE — BH INPATIENT PSYCHIATRY PROGRESS NOTE - MSE UNSTRUCTURED FT
The patient appears stated age, dressed in hospital gowns. Superficially cooperative with the interview with intermittent eye contact. No PMA/ PMR noted. The patient's speech is fluent but soft with some latency. Reported mood "ok" with constricted affect. Thought process grossly linear. Thought content: denies any delusions, denies current SI/HI. No perceptual disturbance appreciated. Insight and judgment are poor. Impulse control intact at the time of exam.

## 2021-03-24 NOTE — BH INPATIENT PSYCHIATRY PROGRESS NOTE - NSBHMETABOLIC_PSY_ALL_CORE_FT
BMI: BMI (kg/m2): 23.4 (03-19-21 @ 01:15)  HbA1c: A1C with Estimated Average Glucose Result: 5.5 % (03-23-21 @ 09:42)  BP: 138/63 (03-24-21 @ 08:11) (138/63 - 145/72)  Lipid Panel: Date/Time: 03-23-21 @ 09:42  Cholesterol, Serum: 172  HDL Cholesterol, Serum: 60  Triglycerides, Serum: 60

## 2021-03-24 NOTE — BH INPATIENT PSYCHIATRY PROGRESS NOTE - NSBHFUPINTERVALHXFT_PSY_A_CORE
Patient seen for depression and behavioral disturbance. Chart reviewed and case discussed with interdisciplinary staff. Per staff, pt is in better behavioral control, no incidents since the weekend. On encounter today, patient is calm, gives brief responses to most questions, denies pain/ dizziness, denies any other concern. His appetite and sleep are improving--observed finishing his breakfast this morning. Patient denies SI/HI. He remains med compliant.

## 2021-03-25 PROCEDURE — 99232 SBSQ HOSP IP/OBS MODERATE 35: CPT

## 2021-03-25 RX ORDER — CHOLECALCIFEROL (VITAMIN D3) 125 MCG
1000 CAPSULE ORAL DAILY
Refills: 0 | Status: DISCONTINUED | OUTPATIENT
Start: 2021-03-25 | End: 2021-05-24

## 2021-03-25 RX ADMIN — Medication 30 MILLILITER(S): at 22:14

## 2021-03-25 RX ADMIN — MIRTAZAPINE 22.5 MILLIGRAM(S): 45 TABLET, ORALLY DISINTEGRATING ORAL at 20:17

## 2021-03-25 RX ADMIN — RISPERIDONE 0.25 MILLIGRAM(S): 4 TABLET ORAL at 12:58

## 2021-03-25 RX ADMIN — RISPERIDONE 0.25 MILLIGRAM(S): 4 TABLET ORAL at 20:18

## 2021-03-25 RX ADMIN — Medication 1 MILLIGRAM(S): at 08:47

## 2021-03-25 RX ADMIN — FINASTERIDE 5 MILLIGRAM(S): 5 TABLET, FILM COATED ORAL at 08:46

## 2021-03-25 RX ADMIN — AMLODIPINE BESYLATE 5 MILLIGRAM(S): 2.5 TABLET ORAL at 08:47

## 2021-03-25 RX ADMIN — Medication 0.5 MILLIGRAM(S): at 22:14

## 2021-03-25 RX ADMIN — RISPERIDONE 0.25 MILLIGRAM(S): 4 TABLET ORAL at 08:46

## 2021-03-25 RX ADMIN — Medication 1000 UNIT(S): at 12:58

## 2021-03-25 NOTE — BH INPATIENT PSYCHIATRY PROGRESS NOTE - NSBHFUPINTERVALHXFT_PSY_A_CORE
Patient seen for depression and behavioral disturbance. Chart reviewed and case discussed with interdisciplinary staff. No overnight events reported. On encounter patient is a little disorganized and confused, only gives brief responses to most questions, denies pain/ dizziness, denies any other concern. His appetite and sleep are good--ate most of his breakfast. Patient denies SI/HI or urge to self-harm. He remains med compliant.

## 2021-03-25 NOTE — BH INPATIENT PSYCHIATRY PROGRESS NOTE - NSBHCHARTREVIEWVS_PSY_A_CORE FT
Vital Signs Last 24 Hrs  T(C): 36.2 (25 Mar 2021 06:02), Max: 36.8 (24 Mar 2021 16:04)  T(F): 97.2 (25 Mar 2021 06:02), Max: 98.2 (24 Mar 2021 16:04)  RR: 16 (25 Mar 2021 06:02) (16 - 18)  Orthostatic VS  03-25-21 @ 06:02  Lying BP: 138/67 HR: 55   Sitting BP: 139/86 HR: 72

## 2021-03-25 NOTE — BH INPATIENT PSYCHIATRY PROGRESS NOTE - CURRENT MEDICATION
MEDICATIONS  (STANDING):  amLODIPine   Tablet 5 milliGRAM(s) Oral daily  cholecalciferol 1000 Unit(s) Oral daily  finasteride 5 milliGRAM(s) Oral daily  folic acid 1 milliGRAM(s) Oral daily  mirtazapine 22.5 milliGRAM(s) Oral at bedtime  risperiDONE   Tablet 0.25 milliGRAM(s) Oral three times a day    MEDICATIONS  (PRN):  aluminum hydroxide/magnesium hydroxide/simethicone Suspension 30 milliLiter(s) Oral every 4 hours PRN Dyspepsia  LORazepam     Tablet 0.5 milliGRAM(s) Oral two times a day PRN Agitation  risperiDONE   Tablet 0.25 milliGRAM(s) Oral every 8 hours PRN Agitation

## 2021-03-25 NOTE — BH INPATIENT PSYCHIATRY PROGRESS NOTE - NSBHASSESSSUMMFT_PSY_ALL_CORE
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     03/25: Patient is still minimally engaging with interview, remains somewhat confused. but denies any concern, still a little disorganized. Per staff, pt is in behavioral control, appetite and sleep are adequate. Plan: Will continue Mirtazapine at 22.5mg at bedtime and Risperidone 0.25mg TID.

## 2021-03-25 NOTE — BH INPATIENT PSYCHIATRY PROGRESS NOTE - NSBHMETABOLIC_PSY_ALL_CORE_FT
BMI: BMI (kg/m2): 23.4 (03-19-21 @ 01:15)  HbA1c: A1C with Estimated Average Glucose Result: 5.5 % (03-23-21 @ 09:42)    Glucose:   BP: 138/63 (03-24-21 @ 08:11) (138/63 - 138/63)  Lipid Panel: Date/Time: 03-23-21 @ 09:42  Cholesterol, Serum: 172  Direct LDL: --  HDL Cholesterol, Serum: 60  Total Cholesterol/HDL Ration Measurement: --  Triglycerides, Serum: 60

## 2021-03-25 NOTE — BH INPATIENT PSYCHIATRY PROGRESS NOTE - MSE UNSTRUCTURED FT
The patient appears stated age, dressed in hospital gowns. Superficially cooperative with the interview with intermittent eye contact. No PMA/ PMR noted. The patient's speech is fluent but soft with some latency. Reported mood "ok" with constricted affect. Thought process grossly linear. Thought content: denies any delusions, denies current SI/HI. No perceptual disturbance appreciated. Insight and judgment are poor. Impulse control intact at the time of exam. Oriented to self and somewhat to place.

## 2021-03-26 PROCEDURE — 99232 SBSQ HOSP IP/OBS MODERATE 35: CPT

## 2021-03-26 RX ADMIN — Medication 1 MILLIGRAM(S): at 12:57

## 2021-03-26 RX ADMIN — RISPERIDONE 0.25 MILLIGRAM(S): 4 TABLET ORAL at 20:34

## 2021-03-26 RX ADMIN — RISPERIDONE 0.25 MILLIGRAM(S): 4 TABLET ORAL at 10:43

## 2021-03-26 RX ADMIN — FINASTERIDE 5 MILLIGRAM(S): 5 TABLET, FILM COATED ORAL at 10:43

## 2021-03-26 RX ADMIN — Medication 1000 UNIT(S): at 10:43

## 2021-03-26 RX ADMIN — RISPERIDONE 0.25 MILLIGRAM(S): 4 TABLET ORAL at 12:57

## 2021-03-26 RX ADMIN — MIRTAZAPINE 22.5 MILLIGRAM(S): 45 TABLET, ORALLY DISINTEGRATING ORAL at 20:35

## 2021-03-26 NOTE — BH INPATIENT PSYCHIATRY PROGRESS NOTE - NSBHCHARTREVIEWVS_PSY_A_CORE FT
Vital Signs Last 24 Hrs  T(C): 37 (26 Mar 2021 08:14), Max: 37 (26 Mar 2021 08:14)  T(F): 98.6 (26 Mar 2021 08:14), Max: 98.6 (26 Mar 2021 08:14)  Orthostatic VS  03-26-21 @ 08:14  Lying BP: 99/61 HR: 113   Sitting BP: 114/64 HR: 90

## 2021-03-26 NOTE — BH INPATIENT PSYCHIATRY PROGRESS NOTE - NSBHMETABOLIC_PSY_ALL_CORE_FT
BMI: BMI (kg/m2): 23.4 (03-19-21 @ 01:15)  HbA1c: A1C with Estimated Average Glucose Result: 5.5 % (03-23-21 @ 09:42)   BP: 138/63 (03-24-21 @ 08:11) (138/63 - 138/63)  Lipid Panel: Date/Time: 03-23-21 @ 09:42  Cholesterol, Serum: 172  HDL Cholesterol, Serum: 60  Triglycerides, Serum: 60

## 2021-03-26 NOTE — BH INPATIENT PSYCHIATRY PROGRESS NOTE - NSBHFUPINTERVALHXFT_PSY_A_CORE
Patient seen for depression and behavioral disturbance. Chart reviewed and case discussed with interdisciplinary staff. Per staff, patient started to hit his hands against the armrest of his chair last night and received PRN Ativan with good effect. On encounter today, patient reports he feels "ok"'; when asked about yesterday's incident, he relates that he was anxious and his stomach was hurting last night. More chatty today, denies current pain/ dizziness, denies any urge to hurt self. His appetite and sleep are good, but only eats when he likes what is served. Patient denies SI/HI. He remains med compliant.

## 2021-03-26 NOTE — BH INPATIENT PSYCHIATRY PROGRESS NOTE - NSBHASSESSSUMMFT_PSY_ALL_CORE
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     03/26: Patient more talkative today but tried to bang hand against armchair yesterday. Denies any urge to self-harm today. Per staff, appetite and sleep are adequate, but pt refuses to eat if he does not like what is served. Plan: Will increase Mirtazapine to 30mg at bedtime to help with anxiety/ agitation and continue Risperidone at 0.25mg TID. Nutritional consult ordered to help with meal choices.

## 2021-03-27 PROCEDURE — 99232 SBSQ HOSP IP/OBS MODERATE 35: CPT

## 2021-03-27 RX ADMIN — Medication 1 MILLIGRAM(S): at 08:15

## 2021-03-27 RX ADMIN — AMLODIPINE BESYLATE 5 MILLIGRAM(S): 2.5 TABLET ORAL at 08:15

## 2021-03-27 RX ADMIN — RISPERIDONE 0.25 MILLIGRAM(S): 4 TABLET ORAL at 20:43

## 2021-03-27 RX ADMIN — Medication 1000 UNIT(S): at 08:15

## 2021-03-27 RX ADMIN — Medication 0.5 MILLIGRAM(S): at 03:50

## 2021-03-27 RX ADMIN — FINASTERIDE 5 MILLIGRAM(S): 5 TABLET, FILM COATED ORAL at 08:15

## 2021-03-27 RX ADMIN — MIRTAZAPINE 22.5 MILLIGRAM(S): 45 TABLET, ORALLY DISINTEGRATING ORAL at 20:43

## 2021-03-27 RX ADMIN — RISPERIDONE 0.25 MILLIGRAM(S): 4 TABLET ORAL at 12:49

## 2021-03-27 RX ADMIN — RISPERIDONE 0.25 MILLIGRAM(S): 4 TABLET ORAL at 08:16

## 2021-03-27 NOTE — BH INPATIENT PSYCHIATRY PROGRESS NOTE - NSBHCHARTREVIEWVS_PSY_A_CORE FT
Vital Signs Last 24 Hrs  T(C): 36.2 (27 Mar 2021 07:57), Max: 37 (26 Mar 2021 19:42)  T(F): 97.1 (27 Mar 2021 07:57), Max: 98.6 (26 Mar 2021 19:42)  HR: --59  BP: --143/73  BP(mean): --  RR: --  SpO2: --

## 2021-03-27 NOTE — BH INPATIENT PSYCHIATRY PROGRESS NOTE - NSBHMETABOLIC_PSY_ALL_CORE_FT
BMI: BMI (kg/m2): 23.4 (03-19-21 @ 01:15)  HbA1c: A1C with Estimated Average Glucose Result: 5.5 % (03-23-21 @ 09:42)    Glucose:   BP: --  Lipid Panel: Date/Time: 03-23-21 @ 09:42  Cholesterol, Serum: 172  Direct LDL: --  HDL Cholesterol, Serum: 60  Total Cholesterol/HDL Ration Measurement: --  Triglycerides, Serum: 60

## 2021-03-27 NOTE — BH INPATIENT PSYCHIATRY PROGRESS NOTE - NSBHFUPINTERVALHXFT_PSY_A_CORE
Patient seen for depression and behavioral disturbance. Chart reviewed and case discussed with nursing staff. Per staff, patient is depressed, confused and disorganized, had 1 episode of loose bowel movement this am.    On exam today, patient reports feeling ok, but c/o diarrhea, spoke about his banging his had/head the night before due to depression. Sleep was not good due to his bowel movement, appetite is alright. Denies any SI, HI, paranoia.

## 2021-03-27 NOTE — BH INPATIENT PSYCHIATRY PROGRESS NOTE - NSBHASSESSSUMMFT_PSY_ALL_CORE
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     03/26: Patient more talkative today but tried to bang hand against armchair yesterday. Denies any urge to self-harm today. Per staff, appetite and sleep are adequate, but pt refuses to eat if he does not like what is served. Plan: Will increase Mirtazapine to 30mg at bedtime to help with anxiety/ agitation and continue Risperidone at 0.25mg TID. Nutritional consult ordered to help with meal choices.     3/27/21: dysphoric, anxious, no acute outbursts this am, had 1 loose bowel movement, instructed staff to f/u and call PA as needed.

## 2021-03-28 PROCEDURE — 99232 SBSQ HOSP IP/OBS MODERATE 35: CPT

## 2021-03-28 RX ADMIN — FINASTERIDE 5 MILLIGRAM(S): 5 TABLET, FILM COATED ORAL at 08:47

## 2021-03-28 RX ADMIN — Medication 1 MILLIGRAM(S): at 08:48

## 2021-03-28 RX ADMIN — RISPERIDONE 0.25 MILLIGRAM(S): 4 TABLET ORAL at 20:33

## 2021-03-28 RX ADMIN — Medication 1000 UNIT(S): at 08:47

## 2021-03-28 RX ADMIN — MIRTAZAPINE 22.5 MILLIGRAM(S): 45 TABLET, ORALLY DISINTEGRATING ORAL at 20:33

## 2021-03-28 RX ADMIN — RISPERIDONE 0.25 MILLIGRAM(S): 4 TABLET ORAL at 08:48

## 2021-03-28 RX ADMIN — AMLODIPINE BESYLATE 5 MILLIGRAM(S): 2.5 TABLET ORAL at 08:48

## 2021-03-28 RX ADMIN — Medication 30 MILLILITER(S): at 23:12

## 2021-03-28 RX ADMIN — RISPERIDONE 0.25 MILLIGRAM(S): 4 TABLET ORAL at 12:22

## 2021-03-28 NOTE — BH INPATIENT PSYCHIATRY PROGRESS NOTE - NSBHASSESSSUMMFT_PSY_ALL_CORE
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     03/26: Patient more talkative today but tried to bang hand against armchair yesterday. Denies any urge to self-harm today. Per staff, appetite and sleep are adequate, but pt refuses to eat if he does not like what is served. Plan: Will increase Mirtazapine to 30mg at bedtime to help with anxiety/ agitation and continue Risperidone at 0.25mg TID. Nutritional consult ordered to help with meal choices.     3/27/21: dysphoric, anxious, no acute outbursts this am, had 1 loose bowel movement, instructed staff to f/u and call PA as needed.     3/28/21: depressed, anxious, but no acute events, no SI/HI/self injurious behavior, no loose bowel movement reported.

## 2021-03-28 NOTE — BH INPATIENT PSYCHIATRY PROGRESS NOTE - NSBHCHARTREVIEWVS_PSY_A_CORE FT
Vital Signs Last 24 Hrs  T(C): 36.8 (28 Mar 2021 08:18), Max: 37.3 (27 Mar 2021 19:52)  T(F): 98.2 (28 Mar 2021 08:18), Max: 99.2 (27 Mar 2021 19:52)  HR: 70  BP: 128/62  BP(mean): --  RR: --  SpO2: --

## 2021-03-28 NOTE — BH INPATIENT PSYCHIATRY PROGRESS NOTE - NSBHFUPINTERVALHXFT_PSY_A_CORE
Patient seen for depression and behavioral disturbance. Chart reviewed and case discussed with nursing staff. Per staff, no acute events.    On exam today, patient reports feeling ok, so so, sleep and appetite are alright. When asked about depression, he says he is as he banging his had/head 2 days ago due to depression, denies any ideas/plan to do so again, 'I should not do it'. Endorses feeling anxious.  Denies any celeste, paranoia, SI, HI, hallucination or physical distress.

## 2021-03-28 NOTE — BH INPATIENT PSYCHIATRY PROGRESS NOTE - NSBHMSEJUDGE_PSY_A_CORE
Fair
Breathing spontaneous and unlabored. Breath sounds clear and equal bilaterally with regular rhythm.
Poor

## 2021-03-29 PROCEDURE — 99232 SBSQ HOSP IP/OBS MODERATE 35: CPT

## 2021-03-29 RX ADMIN — FINASTERIDE 5 MILLIGRAM(S): 5 TABLET, FILM COATED ORAL at 09:20

## 2021-03-29 RX ADMIN — MIRTAZAPINE 22.5 MILLIGRAM(S): 45 TABLET, ORALLY DISINTEGRATING ORAL at 20:24

## 2021-03-29 RX ADMIN — RISPERIDONE 0.25 MILLIGRAM(S): 4 TABLET ORAL at 09:20

## 2021-03-29 RX ADMIN — RISPERIDONE 0.25 MILLIGRAM(S): 4 TABLET ORAL at 20:24

## 2021-03-29 RX ADMIN — RISPERIDONE 0.25 MILLIGRAM(S): 4 TABLET ORAL at 13:11

## 2021-03-29 RX ADMIN — AMLODIPINE BESYLATE 5 MILLIGRAM(S): 2.5 TABLET ORAL at 09:20

## 2021-03-29 RX ADMIN — Medication 1000 UNIT(S): at 09:20

## 2021-03-29 RX ADMIN — Medication 1 MILLIGRAM(S): at 09:21

## 2021-03-29 NOTE — DIETITIAN INITIAL EVALUATION ADULT. - OTHER INFO
Met Pt in day room. States " I am eating ok" refers fair appetite/po intake PTA. No GI distress noted. Denies chewing/swallowing difficulty. Declines snacks/po supplements at this time. Encouraged po intake. Pt verbalized understanding

## 2021-03-29 NOTE — BH INPATIENT PSYCHIATRY PROGRESS NOTE - NSBHCHARTREVIEWVS_PSY_A_CORE FT
Vital Signs Last 24 Hrs  T(C): 36.6 (29 Mar 2021 07:39), Max: 37.1 (28 Mar 2021 15:49)  T(F): 97.8 (29 Mar 2021 07:39), Max: 98.7 (28 Mar 2021 15:49)  HR: 98 (29 Mar 2021 07:39) (98 - 98)  BP: 135/72 (29 Mar 2021 07:39) (135/72 - 135/72)  BP(mean): --  RR: --  SpO2: -- Vital Signs Last 24 Hrs  T(C): 36.6 (29 Mar 2021 07:39), Max: 37.1 (28 Mar 2021 15:49)  T(F): 97.8 (29 Mar 2021 07:39), Max: 98.7 (28 Mar 2021 15:49)  HR: 98 (29 Mar 2021 07:39) (98 - 98)  BP: 135/72 (29 Mar 2021 07:39) (135/72 - 135/72)

## 2021-03-29 NOTE — BH INPATIENT PSYCHIATRY PROGRESS NOTE - MSE UNSTRUCTURED FT
The patient appears stated age, dressed in hospital gowns. Superficially cooperative with the interview, answers "I don't know" to many questions, with intermittent eye contact. No PMA/ PMR noted. The patient's speech is fluent but soft with some latency. Reported mood "alright" with constricted affect. Thought process grossly linear. Thought content: denies any delusions, denies current SI/HI. No perceptual disturbance appreciated. Insight and judgment are poor. Impulse control intact at the time of exam. Oriented to self and somewhat to place.

## 2021-03-29 NOTE — BH INPATIENT PSYCHIATRY PROGRESS NOTE - PRN MEDS
MEDICATIONS  (PRN):  aluminum hydroxide/magnesium hydroxide/simethicone Suspension 30 milliLiter(s) Oral every 4 hours PRN Dyspepsia  LORazepam     Tablet 0.5 milliGRAM(s) Oral two times a day PRN Agitation  risperiDONE   Tablet 0.25 milliGRAM(s) Oral every 8 hours PRN Agitation   MEDICATIONS  (PRN):  aluminum hydroxide/magnesium hydroxide/simethicone Suspension 30 milliLiter(s) Oral every 4 hours PRN Dyspepsia  LORazepam Tablet 0.5 milliGRAM(s) Oral two times a day PRN Agitation  risperiDONE   Tablet 0.25 milliGRAM(s) Oral every 8 hours PRN Agitation

## 2021-03-29 NOTE — BH INPATIENT PSYCHIATRY PROGRESS NOTE - NSBHASSESSSUMMFT_PSY_ALL_CORE
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 No GI complaints at this time.      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 No GI complaints at this time.   Plan: Will continue current management with Mirtazapine and Risperidone.

## 2021-03-29 NOTE — BH INPATIENT PSYCHIATRY PROGRESS NOTE - NSBHFUPINTERVALHXFT_PSY_A_CORE
Patient seen for depression and behavioral disturbance. Chart reviewed and case discussed with nursing staff. Per staff, no acute events. PRN Maalox, no PRNs for agitation.     On interview, patient states he is doing "alright" this morning. Reports improved sleep and appetite compared to the weekend. States he felt depressed two days ago and hit his hands on the wall, and says that they are still sore. When asked why he hits his hands on, patient states "I don't know". He says he has not felt not felt as depressed and anxious today as he did over the weekend  Pt denies any AH/VH or SI/HI. Denies any GI complaints after Maalox last evening.    Patient seen for depression and behavioral disturbance. Chart reviewed and case discussed with nursing staff. Per staff, no acute events. PRN Maalox, no PRNs for agitation.   On interview, patient states he is doing "alright" this morning. Reports improved sleep and appetite compared to the weekend. States he felt depressed two days ago and hit his hands on the wall, and says that they are still sore. When asked why he hits his hands on, patient states "I don't know". He says he has not felt not felt as depressed and anxious today as he did over the weekend  Pt denies any AH/VH or SI/HI. Denies any GI complaints after Maalox last evening.

## 2021-03-29 NOTE — BH INPATIENT PSYCHIATRY PROGRESS NOTE - NSBHMETABOLIC_PSY_ALL_CORE_FT
BMI: BMI (kg/m2): 23.4 (03-19-21 @ 01:15)  HbA1c: A1C with Estimated Average Glucose Result: 5.5 % (03-23-21 @ 09:42)    Glucose:   BP: 135/72 (03-29-21 @ 07:39) (135/72 - 135/72)  Lipid Panel: Date/Time: 03-23-21 @ 09:42  Cholesterol, Serum: 172  Direct LDL: --  HDL Cholesterol, Serum: 60  Total Cholesterol/HDL Ration Measurement: --  Triglycerides, Serum: 60   BMI: BMI (kg/m2): 23.4 (03-19-21 @ 01:15)  HbA1c: A1C with Estimated Average Glucose Result: 5.5 % (03-23-21 @ 09:42)  BP: 135/72 (03-29-21 @ 07:39) (135/72 - 135/72)  Lipid Panel: Date/Time: 03-23-21 @ 09:42  Cholesterol, Serum: 172  HDL Cholesterol, Serum: 60  Triglycerides, Serum: 60

## 2021-03-30 RX ADMIN — FINASTERIDE 5 MILLIGRAM(S): 5 TABLET, FILM COATED ORAL at 10:02

## 2021-03-30 RX ADMIN — Medication 30 MILLILITER(S): at 08:40

## 2021-03-30 RX ADMIN — Medication 10 MILLIGRAM(S): at 10:02

## 2021-03-30 RX ADMIN — RISPERIDONE 0.25 MILLIGRAM(S): 4 TABLET ORAL at 10:02

## 2021-03-30 RX ADMIN — RISPERIDONE 0.25 MILLIGRAM(S): 4 TABLET ORAL at 13:30

## 2021-03-30 RX ADMIN — AMLODIPINE BESYLATE 5 MILLIGRAM(S): 2.5 TABLET ORAL at 10:01

## 2021-03-30 RX ADMIN — RISPERIDONE 0.25 MILLIGRAM(S): 4 TABLET ORAL at 20:34

## 2021-03-30 RX ADMIN — Medication 30 MILLILITER(S): at 02:01

## 2021-03-30 RX ADMIN — MIRTAZAPINE 22.5 MILLIGRAM(S): 45 TABLET, ORALLY DISINTEGRATING ORAL at 20:34

## 2021-03-30 RX ADMIN — Medication 1000 UNIT(S): at 10:02

## 2021-03-30 RX ADMIN — Medication 1 MILLIGRAM(S): at 10:03

## 2021-03-30 NOTE — BH INPATIENT PSYCHIATRY PROGRESS NOTE - PRN MEDS
MEDICATIONS  (PRN):  aluminum hydroxide/magnesium hydroxide/simethicone Suspension 30 milliLiter(s) Oral every 4 hours PRN Dyspepsia  bisacodyl 10 milliGRAM(s) Oral once PRN Constipation  LORazepam     Tablet 0.5 milliGRAM(s) Oral two times a day PRN Agitation  risperiDONE   Tablet 0.25 milliGRAM(s) Oral every 8 hours PRN Agitation

## 2021-03-30 NOTE — BH INPATIENT PSYCHIATRY PROGRESS NOTE - NSBHCHARTREVIEWVS_PSY_A_CORE FT
Vital Signs Last 24 Hrs  T(C): 36.3 (30 Mar 2021 05:59), Max: 36.8 (29 Mar 2021 15:47)  T(F): 97.4 (30 Mar 2021 05:59), Max: 98.2 (29 Mar 2021 15:47)  HR: --  BP: --  BP(mean): --  RR: 16 (30 Mar 2021 05:59) (16 - 16)  SpO2: --

## 2021-03-30 NOTE — BH INPATIENT PSYCHIATRY PROGRESS NOTE - MSE UNSTRUCTURED FT
The patient appears stated age, dressed in hospital gowns.  Uncomfortable appearing, crying, sitting in common room chair, cooperative on exam. No PMA/ PMR noted. The patient's speech is fluent but soft with some latency. Reported mood "terrible" with tearful constricted affect. Thought process grossly linear. Thought content: denies any delusions, denies current SI/HI. No perceptual disturbance appreciated. Insight and judgment are poor. Impulse control intact at the time of exam. Oriented to self and somewhat to place.  The patient appears stated age, dressed in hospital gowns. Uncomfortable appearing, crying, sitting in common room chair, cooperative on exam. No PMA/ PMR noted. The patient's speech is fluent but soft with some latency. Reported mood "terrible" with tearful constricted affect. Thought process grossly linear. Thought content: denies any delusions, denies current SI/HI. No perceptual disturbance appreciated. Insight and judgment are poor. Impulse control intact at the time of exam. Oriented to self and somewhat to place.

## 2021-03-30 NOTE — BH INPATIENT PSYCHIATRY PROGRESS NOTE - NSBHASSESSSUMMFT_PSY_ALL_CORE
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 No GI complaints at this time.     3/30/21: patient with new onset abdominal discomfort, likely secondary to constipation (last BM 3 days ago). Despite GI complaints, patient reports improved depression, denies SI/HI/NSSIB.     Plan  Psych: c/w Risperdal/ Remeron at current dose  Med:  Discussed with hospitalist, to be evaluated today. Dulcolax 10mg for constipation.

## 2021-03-30 NOTE — BH INPATIENT PSYCHIATRY PROGRESS NOTE - CURRENT MEDICATION
MEDICATIONS  (STANDING):  amLODIPine   Tablet 5 milliGRAM(s) Oral daily  cholecalciferol 1000 Unit(s) Oral daily  finasteride 5 milliGRAM(s) Oral daily  folic acid 1 milliGRAM(s) Oral daily  mirtazapine 22.5 milliGRAM(s) Oral at bedtime  risperiDONE   Tablet 0.25 milliGRAM(s) Oral three times a day    MEDICATIONS  (PRN):  aluminum hydroxide/magnesium hydroxide/simethicone Suspension 30 milliLiter(s) Oral every 4 hours PRN Dyspepsia  bisacodyl 10 milliGRAM(s) Oral once PRN Constipation  LORazepam     Tablet 0.5 milliGRAM(s) Oral two times a day PRN Agitation  risperiDONE   Tablet 0.25 milliGRAM(s) Oral every 8 hours PRN Agitation

## 2021-03-30 NOTE — BH INPATIENT PSYCHIATRY PROGRESS NOTE - NSBHMETABOLIC_PSY_ALL_CORE_FT
BMI: BMI (kg/m2): 23.4 (03-19-21 @ 01:15)  HbA1c: A1C with Estimated Average Glucose Result: 5.5 % (03-23-21 @ 09:42)    Glucose:   BP: 135/72 (03-29-21 @ 07:39) (135/72 - 135/72)  Lipid Panel: Date/Time: 03-23-21 @ 09:42  Cholesterol, Serum: 172  Direct LDL: --  HDL Cholesterol, Serum: 60  Total Cholesterol/HDL Ration Measurement: --  Triglycerides, Serum: 60

## 2021-03-30 NOTE — BH INPATIENT PSYCHIATRY PROGRESS NOTE - NSBHFUPINTERVALHXFT_PSY_A_CORE
Patient seen for depression and behavioral disturbance. Chart reviewed and case discussed with nursing staff. Per staff, no acute events. PRN Maalox, no PRNs for agitation.    Pt complains that late last night he began to experience painful lower abdominal cramping and belching, which he attributes to eating too much dinner last night. Symptoms not improved with Maalox. He reports poor sleep and appetite this morning due to these GI complaints. He has not had a BM for the last 3 days, but denies any nausea or vomiting. Pt states he feels anxious due to his discomfort, but "feels fine" otherwise. He denies any thoughts of self harm, and has not hit his head or hands against the wall since this weekend. He denies any SI, HI, or AH/VH.  Patient seen for depression and behavioral disturbance. Chart reviewed and case discussed with nursing staff. Per staff, no acute events. PRN Maalox, no PRNs for agitation.    Pt complains that late last night he began to experience painful lower abdominal cramping and belching, which he attributes to eating too much dinner last night. Symptoms not improved with Maalox. He reports poor sleep and appetite this morning due to these GI complaints. He has not had a BM for the last 3 days, but denies any nausea or vomiting. Pt states he feels anxious due to his discomfort, but "feels fine" otherwise. He denies any thoughts of self harm, and has not hit his head or hands against the wall since this weekend. He denies any SI, HI, or AH/VH.     Spoke with pt's sister: she explains that he gets constipated when he forgets to drink enough fluids, and that she finds herself often reminding him to drink more.

## 2021-03-30 NOTE — BH SCALES AND SCREENS - NSMMSELANG_PSY_ALL_CORE
Named object #1 (e.g.Pen)/Named object #2 (e.g.Watch)/Repeated "no ifs, ands or buts"/Completed command (Stage #1)/Completed command (Stage #3)/Patient able to read and obey a written command/Able to write a sentence that is sensible with a subject and a verb

## 2021-03-31 LAB — SARS-COV-2 RNA SPEC QL NAA+PROBE: SIGNIFICANT CHANGE UP

## 2021-03-31 PROCEDURE — 99232 SBSQ HOSP IP/OBS MODERATE 35: CPT

## 2021-03-31 RX ADMIN — Medication 1000 UNIT(S): at 09:51

## 2021-03-31 RX ADMIN — AMLODIPINE BESYLATE 5 MILLIGRAM(S): 2.5 TABLET ORAL at 09:51

## 2021-03-31 RX ADMIN — RISPERIDONE 0.25 MILLIGRAM(S): 4 TABLET ORAL at 20:51

## 2021-03-31 RX ADMIN — RISPERIDONE 0.25 MILLIGRAM(S): 4 TABLET ORAL at 09:51

## 2021-03-31 RX ADMIN — FINASTERIDE 5 MILLIGRAM(S): 5 TABLET, FILM COATED ORAL at 09:51

## 2021-03-31 RX ADMIN — MIRTAZAPINE 22.5 MILLIGRAM(S): 45 TABLET, ORALLY DISINTEGRATING ORAL at 20:51

## 2021-03-31 RX ADMIN — Medication 1 MILLIGRAM(S): at 09:51

## 2021-03-31 RX ADMIN — RISPERIDONE 0.25 MILLIGRAM(S): 4 TABLET ORAL at 13:15

## 2021-03-31 NOTE — BH INPATIENT PSYCHIATRY PROGRESS NOTE - NSBHFUPINTERVALHXFT_PSY_A_CORE
Patient seen for depression and behavioral disturbance. Chart reviewed and case discussed with nursing staff. Per staff, no acute events. PRN Dulcolax, no PRNs for agitation.  On encounter, patient reports that he feels better than he did yesterday, he had a bowel movement last evening, denies abdominal discomfort, denies any nausea or vomiting. Pt reports his mood as "fine". He denies any thoughts of self harm, and has not hit his head or hands against the wall since this weekend. He denies any SI, HI, or AH/VH.

## 2021-03-31 NOTE — BH INPATIENT PSYCHIATRY PROGRESS NOTE - MSE UNSTRUCTURED FT
The patient appears stated age, dressed in hospital gowns. Comfortable, sitting in common room chair, cooperative on exam. No PMA/ PMR noted. The patient's speech is fluent but soft with some latency. Reported mood "fine" with constricted affect. Thought process grossly linear. Thought content: denies any delusions, denies current SI/HI. No perceptual disturbance appreciated. Insight and judgment are poor. Impulse control intact at the time of exam. Oriented to self and somewhat to place.

## 2021-03-31 NOTE — BH INPATIENT PSYCHIATRY PROGRESS NOTE - NSBHMETABOLIC_PSY_ALL_CORE_FT
BMI: BMI (kg/m2): 23.4 (03-19-21 @ 01:15)  HbA1c: A1C with Estimated Average Glucose Result: 5.5 % (03-23-21 @ 09:42)  BP: 135/72 (03-29-21 @ 07:39) (135/72 - 135/72)  Lipid Panel: Date/Time: 03-23-21 @ 09:42  Cholesterol, Serum: 172  HDL Cholesterol, Serum: 60  Triglycerides, Serum: 60

## 2021-03-31 NOTE — BH INPATIENT PSYCHIATRY PROGRESS NOTE - NSBHASSESSSUMMFT_PSY_ALL_CORE
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   Plan  Psych: c/w Risperdal/ Remeron at current dose

## 2021-03-31 NOTE — BH INPATIENT PSYCHIATRY PROGRESS NOTE - NSBHCHARTREVIEWVS_PSY_A_CORE FT
Vital Signs Last 24 Hrs  T(C): 36.2 (31 Mar 2021 06:36), Max: 36.8 (30 Mar 2021 19:39)  T(F): 97.2 (31 Mar 2021 06:36), Max: 98.2 (30 Mar 2021 19:39)  Orthostatic VS  03-31-21 @ 06:36  Sitting BP: 136/76 HR: 58  Standing BP: 138/89 HR: 71

## 2021-04-01 PROCEDURE — 99232 SBSQ HOSP IP/OBS MODERATE 35: CPT

## 2021-04-01 RX ORDER — GLYCERIN ADULT
1 SUPPOSITORY, RECTAL RECTAL DAILY
Refills: 0 | Status: DISCONTINUED | OUTPATIENT
Start: 2021-04-01 | End: 2021-05-24

## 2021-04-01 RX ADMIN — RISPERIDONE 0.25 MILLIGRAM(S): 4 TABLET ORAL at 20:38

## 2021-04-01 RX ADMIN — RISPERIDONE 0.25 MILLIGRAM(S): 4 TABLET ORAL at 13:25

## 2021-04-01 RX ADMIN — MIRTAZAPINE 22.5 MILLIGRAM(S): 45 TABLET, ORALLY DISINTEGRATING ORAL at 20:37

## 2021-04-01 NOTE — BH INPATIENT PSYCHIATRY PROGRESS NOTE - NSBHASSESSSUMMFT_PSY_ALL_CORE
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 Patient continues to have abdominal cramping and dyspepsia after eating, unclear if true GI pathology or somatization. To be seen by medicine team today. Aside from GI complaints, he reports improved depression/anxiety and continues to deny any SI/HI/NSSIB.     Plan  Psych: c/w Risperdal/ Remeron at current dose  Med: To be seen by medicine today, will follow recs for GI complaints.

## 2021-04-01 NOTE — BH INPATIENT PSYCHIATRY PROGRESS NOTE - NSBHFUPINTERVALHXFT_PSY_A_CORE
Patient seen for depression and behavioral disturbance. Chart reviewed and case discussed with nursing staff. Per staff, no acute events. PRN Dulcolax, no PRNs for agitation.    Patient seen for depression and behavioral disturbance. Chart reviewed and case discussed with nursing staff. Per staff, no acute events. No PRNs for agitation or GI complaints overnight.     Patient complaining of stomach cramps and dyspepsia this morning. States he has been having GI upset since he ate breakfast. He gives conflicting reports, saying his last BM was 5 days ago, but that he had diarrhea yesterday after getting Dulcolax 2 days ago. Otherwise, patient reports fair sleep, and intact appetite, saying he ate his full breakfast before his symptoms restarted. Denies feeling depressed or anxious. Denies thoughts of self harm since the weekend or SI/HI.

## 2021-04-01 NOTE — BH INPATIENT PSYCHIATRY PROGRESS NOTE - NSBHMETABOLIC_PSY_ALL_CORE_FT
BMI: BMI (kg/m2): 23.4 (03-19-21 @ 01:15)  HbA1c: A1C with Estimated Average Glucose Result: 5.5 % (03-23-21 @ 09:42)    Glucose:   BP: 132/61 (04-01-21 @ 07:46) (132/61 - 132/61)  Lipid Panel: Date/Time: 03-23-21 @ 09:42  Cholesterol, Serum: 172  Direct LDL: --  HDL Cholesterol, Serum: 60  Total Cholesterol/HDL Ration Measurement: --  Triglycerides, Serum: 60

## 2021-04-01 NOTE — BH INPATIENT PSYCHIATRY PROGRESS NOTE - NSBHCHARTREVIEWVS_PSY_A_CORE FT
Vital Signs Last 24 Hrs  T(C): 36.2 (01 Apr 2021 07:46), Max: 36.8 (31 Mar 2021 15:40)  T(F): 97.1 (01 Apr 2021 07:46), Max: 98.3 (31 Mar 2021 15:40)  HR: 60 (01 Apr 2021 07:46) (60 - 60)  BP: 132/61 (01 Apr 2021 07:46) (132/61 - 132/61)  BP(mean): --  RR: 17 (01 Apr 2021 07:46) (17 - 17)  SpO2: --

## 2021-04-01 NOTE — BH INPATIENT PSYCHIATRY PROGRESS NOTE - MSE UNSTRUCTURED FT
The patient appears stated age, dressed in hospital gowns. Comfortable, sitting in common room chair, cooperative on exam. No PMA/ PMR noted. The patient's speech is fluent but soft with some latency. Reported mood "fine" with constricted affect. Thought process grossly linear. Thought content: denies any delusions, denies current SI/HI. No perceptual disturbance appreciated. Insight and judgment are poor. Impulse control intact at the time of exam. Oriented to self and somewhat to place.  The patient appears stated age, dressed in hospital gowns. Uncomfortable appearing, sitting forward in common room chair, cooperative on exam. No PMA/ PMR noted. The patient's speech is fluent but soft with some latency. Reported mood "terrible" with constricted affect. Thought process grossly linear. Thought content: denies any delusions, denies current SI/HI. No perceptual disturbance appreciated. Insight and judgment are poor. Impulse control intact at the time of exam. Oriented to self and to place.

## 2021-04-02 PROCEDURE — 99232 SBSQ HOSP IP/OBS MODERATE 35: CPT

## 2021-04-02 RX ADMIN — AMLODIPINE BESYLATE 5 MILLIGRAM(S): 2.5 TABLET ORAL at 08:51

## 2021-04-02 RX ADMIN — Medication 1000 UNIT(S): at 08:51

## 2021-04-02 RX ADMIN — FINASTERIDE 5 MILLIGRAM(S): 5 TABLET, FILM COATED ORAL at 08:51

## 2021-04-02 RX ADMIN — Medication 0.5 MILLIGRAM(S): at 08:51

## 2021-04-02 RX ADMIN — Medication 1 MILLIGRAM(S): at 08:51

## 2021-04-02 RX ADMIN — RISPERIDONE 0.25 MILLIGRAM(S): 4 TABLET ORAL at 08:50

## 2021-04-02 RX ADMIN — RISPERIDONE 0.25 MILLIGRAM(S): 4 TABLET ORAL at 20:45

## 2021-04-02 RX ADMIN — MIRTAZAPINE 22.5 MILLIGRAM(S): 45 TABLET, ORALLY DISINTEGRATING ORAL at 20:46

## 2021-04-02 NOTE — BH INPATIENT PSYCHIATRY PROGRESS NOTE - NSBHFUPINTERVALHXFT_PSY_A_CORE
Patient seen for depression and behavioral disturbance. Chart reviewed and case discussed with nursing staff. Per staff, no acute events. No PRNs for agitation or GI complaints overnight.     Patient is encountered in day room.  He is asked if he received the suppository last night, which he confirms.  He additionally states that he had a bowel movement and feels much better. Denies feeling depressed or anxious. Denies thoughts of self harm since the weekend or SI/HI.

## 2021-04-02 NOTE — BH INPATIENT PSYCHIATRY PROGRESS NOTE - MSE UNSTRUCTURED FT
The patient appears stated age, dressed in hospital gowns. Uncomfortable appearing, sitting forward in common room chair, cooperative on exam. No PMA/ PMR noted. The patient's speech is fluent but soft with some latency. Reported mood "no complaints" with constricted affect. Thought process grossly linear. Thought content: denies any delusions, denies current SI/HI. No perceptual disturbance appreciated. Insight and judgment are poor. Impulse control intact at the time of exam. Oriented to self and to place.

## 2021-04-02 NOTE — BH INPATIENT PSYCHIATRY PROGRESS NOTE - NSBHCHARTREVIEWVS_PSY_A_CORE FT
Vital Signs Last 24 Hrs  T(C): 36.3 (02 Apr 2021 05:19), Max: 36.7 (01 Apr 2021 15:23)  T(F): 97.3 (02 Apr 2021 05:19), Max: 98.1 (01 Apr 2021 15:23)  HR: --  BP: --  BP(mean): --  RR: 18 (02 Apr 2021 05:19) (18 - 18)  SpO2: --

## 2021-04-02 NOTE — BH INPATIENT PSYCHIATRY PROGRESS NOTE - PRN MEDS
MEDICATIONS  (PRN):  aluminum hydroxide/magnesium hydroxide/simethicone Suspension 30 milliLiter(s) Oral every 4 hours PRN Dyspepsia  glycerin Suppository - Adult 1 Suppository(s) Rectal daily PRN constipation  LORazepam     Tablet 0.5 milliGRAM(s) Oral two times a day PRN Agitation  risperiDONE   Tablet 0.25 milliGRAM(s) Oral every 8 hours PRN Agitation  saline laxative (FLEET) Rectal Enema 1 Enema Rectal daily PRN constipation

## 2021-04-02 NOTE — BH INPATIENT PSYCHIATRY PROGRESS NOTE - NSBHASSESSSUMMFT_PSY_ALL_CORE
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 Patient continues to have abdominal cramping and dyspepsia after eating, unclear if true GI pathology or somatization. To be seen by medicine team today. Aside from GI complaints, he reports improved depression/anxiety and continues to deny any SI/HI/NSSIB.     Plan  Psych: c/w Risperdal/ Remeron at current dose  Med: To be seen by medicine today, will follow recs for GI complaints.

## 2021-04-02 NOTE — BH INPATIENT PSYCHIATRY PROGRESS NOTE - CURRENT MEDICATION
MEDICATIONS  (STANDING):  amLODIPine   Tablet 5 milliGRAM(s) Oral daily  cholecalciferol 1000 Unit(s) Oral daily  finasteride 5 milliGRAM(s) Oral daily  folic acid 1 milliGRAM(s) Oral daily  mirtazapine 22.5 milliGRAM(s) Oral at bedtime  risperiDONE   Tablet 0.25 milliGRAM(s) Oral three times a day    MEDICATIONS  (PRN):  aluminum hydroxide/magnesium hydroxide/simethicone Suspension 30 milliLiter(s) Oral every 4 hours PRN Dyspepsia  glycerin Suppository - Adult 1 Suppository(s) Rectal daily PRN constipation  LORazepam     Tablet 0.5 milliGRAM(s) Oral two times a day PRN Agitation  risperiDONE   Tablet 0.25 milliGRAM(s) Oral every 8 hours PRN Agitation  saline laxative (FLEET) Rectal Enema 1 Enema Rectal daily PRN constipation

## 2021-04-03 RX ADMIN — Medication 1 MILLIGRAM(S): at 08:32

## 2021-04-03 RX ADMIN — RISPERIDONE 0.25 MILLIGRAM(S): 4 TABLET ORAL at 08:32

## 2021-04-03 RX ADMIN — RISPERIDONE 0.25 MILLIGRAM(S): 4 TABLET ORAL at 13:34

## 2021-04-03 RX ADMIN — RISPERIDONE 0.25 MILLIGRAM(S): 4 TABLET ORAL at 20:18

## 2021-04-03 RX ADMIN — FINASTERIDE 5 MILLIGRAM(S): 5 TABLET, FILM COATED ORAL at 08:32

## 2021-04-03 RX ADMIN — Medication 1000 UNIT(S): at 08:32

## 2021-04-03 RX ADMIN — MIRTAZAPINE 22.5 MILLIGRAM(S): 45 TABLET, ORALLY DISINTEGRATING ORAL at 20:18

## 2021-04-03 RX ADMIN — AMLODIPINE BESYLATE 5 MILLIGRAM(S): 2.5 TABLET ORAL at 08:32

## 2021-04-04 RX ADMIN — Medication 1 MILLIGRAM(S): at 09:22

## 2021-04-04 RX ADMIN — MIRTAZAPINE 22.5 MILLIGRAM(S): 45 TABLET, ORALLY DISINTEGRATING ORAL at 20:27

## 2021-04-04 RX ADMIN — RISPERIDONE 0.25 MILLIGRAM(S): 4 TABLET ORAL at 09:22

## 2021-04-04 RX ADMIN — Medication 1000 UNIT(S): at 09:22

## 2021-04-04 RX ADMIN — RISPERIDONE 0.25 MILLIGRAM(S): 4 TABLET ORAL at 13:02

## 2021-04-04 RX ADMIN — RISPERIDONE 0.25 MILLIGRAM(S): 4 TABLET ORAL at 20:26

## 2021-04-04 RX ADMIN — AMLODIPINE BESYLATE 5 MILLIGRAM(S): 2.5 TABLET ORAL at 09:22

## 2021-04-04 RX ADMIN — FINASTERIDE 5 MILLIGRAM(S): 5 TABLET, FILM COATED ORAL at 09:22

## 2021-04-05 PROCEDURE — 99232 SBSQ HOSP IP/OBS MODERATE 35: CPT

## 2021-04-05 RX ADMIN — MIRTAZAPINE 22.5 MILLIGRAM(S): 45 TABLET, ORALLY DISINTEGRATING ORAL at 20:10

## 2021-04-05 RX ADMIN — FINASTERIDE 5 MILLIGRAM(S): 5 TABLET, FILM COATED ORAL at 09:02

## 2021-04-05 RX ADMIN — Medication 1 MILLIGRAM(S): at 09:02

## 2021-04-05 RX ADMIN — RISPERIDONE 0.25 MILLIGRAM(S): 4 TABLET ORAL at 20:11

## 2021-04-05 RX ADMIN — Medication 1000 UNIT(S): at 09:02

## 2021-04-05 RX ADMIN — AMLODIPINE BESYLATE 5 MILLIGRAM(S): 2.5 TABLET ORAL at 09:02

## 2021-04-05 RX ADMIN — RISPERIDONE 0.25 MILLIGRAM(S): 4 TABLET ORAL at 13:13

## 2021-04-05 RX ADMIN — RISPERIDONE 0.25 MILLIGRAM(S): 4 TABLET ORAL at 09:03

## 2021-04-05 NOTE — BH INPATIENT PSYCHIATRY PROGRESS NOTE - NSBHCHARTREVIEWVS_PSY_A_CORE FT
Vital Signs Last 24 Hrs  T(C): 36.9 (05 Apr 2021 06:22), Max: 36.9 (05 Apr 2021 06:22)  T(F): 98.5 (05 Apr 2021 06:22), Max: 98.5 (05 Apr 2021 06:22)  HR: --  BP: --  BP(mean): --  RR: --  SpO2: --

## 2021-04-05 NOTE — BH INPATIENT PSYCHIATRY PROGRESS NOTE - MSE UNSTRUCTURED FT
The patient appears stated age, dressed in hospital gowns. Uncomfortable appearing, sitting forward in common room chair, cooperative on exam. No PMA/ PMR noted. The patient's speech is fluent but soft with some latency. Reported mood "no complaints" with constricted affect. Thought process grossly linear. He is somatically fixated.  Thought content: denies any delusions, denies current SI/HI. No perceptual disturbance appreciated. Insight and judgment are poor. Impulse control intact at the time of exam. Oriented to self and to place.

## 2021-04-06 PROCEDURE — 99232 SBSQ HOSP IP/OBS MODERATE 35: CPT

## 2021-04-06 RX ADMIN — FINASTERIDE 5 MILLIGRAM(S): 5 TABLET, FILM COATED ORAL at 08:44

## 2021-04-06 RX ADMIN — AMLODIPINE BESYLATE 5 MILLIGRAM(S): 2.5 TABLET ORAL at 08:44

## 2021-04-06 RX ADMIN — MIRTAZAPINE 22.5 MILLIGRAM(S): 45 TABLET, ORALLY DISINTEGRATING ORAL at 20:17

## 2021-04-06 RX ADMIN — RISPERIDONE 0.25 MILLIGRAM(S): 4 TABLET ORAL at 12:56

## 2021-04-06 RX ADMIN — Medication 1000 UNIT(S): at 08:44

## 2021-04-06 RX ADMIN — Medication 1 MILLIGRAM(S): at 08:44

## 2021-04-06 RX ADMIN — RISPERIDONE 0.25 MILLIGRAM(S): 4 TABLET ORAL at 08:44

## 2021-04-06 RX ADMIN — RISPERIDONE 0.25 MILLIGRAM(S): 4 TABLET ORAL at 20:17

## 2021-04-06 NOTE — BH INPATIENT PSYCHIATRY PROGRESS NOTE - NSBHCHARTREVIEWVS_PSY_A_CORE FT
Vital Signs Last 24 Hrs  T(C): 36.6 (06 Apr 2021 07:55), Max: 37.1 (05 Apr 2021 15:21)  T(F): 97.9 (06 Apr 2021 07:55), Max: 98.8 (05 Apr 2021 15:21)  HR: --  BP: --  BP(mean): --  RR: --  SpO2: -- Vital Signs Last 24 Hrs  T(C): 36.6 (06 Apr 2021 07:55), Max: 37.1 (05 Apr 2021 15:21)  T(F): 97.9 (06 Apr 2021 07:55), Max: 98.8 (05 Apr 2021 15:21)  Orthostatic VS  04-06-21 @ 07:55  Sitting BP: 131/76 HR: 64  Standing BP: 125/69 HR: 72

## 2021-04-06 NOTE — BH INPATIENT PSYCHIATRY PROGRESS NOTE - CURRENT MEDICATION
MEDICATIONS  (STANDING):  amLODIPine   Tablet 5 milliGRAM(s) Oral daily  cholecalciferol 1000 Unit(s) Oral daily  finasteride 5 milliGRAM(s) Oral daily  folic acid 1 milliGRAM(s) Oral daily  mirtazapine 22.5 milliGRAM(s) Oral at bedtime  risperiDONE   Tablet 0.25 milliGRAM(s) Oral three times a day    MEDICATIONS  (PRN):  aluminum hydroxide/magnesium hydroxide/simethicone Suspension 30 milliLiter(s) Oral every 4 hours PRN Dyspepsia  glycerin Suppository - Adult 1 Suppository(s) Rectal daily PRN constipation  risperiDONE   Tablet 0.25 milliGRAM(s) Oral every 8 hours PRN Agitation  saline laxative (FLEET) Rectal Enema 1 Enema Rectal daily PRN constipation   MEDICATIONS  (STANDING):  amLODIPine Tablet 5 milliGRAM(s) Oral daily  cholecalciferol 1000 Unit(s) Oral daily  finasteride 5 milliGRAM(s) Oral daily  folic acid 1 milliGRAM(s) Oral daily  mirtazapine 22.5 milliGRAM(s) Oral at bedtime  risperiDONE Tablet 0.25 milliGRAM(s) Oral three times a day    MEDICATIONS  (PRN):  aluminum hydroxide/magnesium hydroxide/simethicone Suspension 30 milliLiter(s) Oral every 4 hours PRN Dyspepsia  glycerin Suppository - Adult 1 Suppository(s) Rectal daily PRN constipation  risperiDONE Tablet 0.25 milliGRAM(s) Oral every 8 hours PRN Agitation  saline laxative (FLEET) Rectal Enema 1 Enema Rectal daily PRN constipation

## 2021-04-06 NOTE — BH INPATIENT PSYCHIATRY PROGRESS NOTE - NSBHASSESSSUMMFT_PSY_ALL_CORE
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      Patient continues to show good impulse control on the unit, also reporting attenuated depression and anxiety. He continues to remain somatically fixated, mostly on his GI symptoms, but this morning on his ear which he is reported to have hit on a velcro door. Suspect his GI complaints are related to eating large amounts of food very fast as reported by staff.       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 in 1989 and most recent 07/10/19 followed by [facility] rickey clinic but withdrew after the initial intake ; h/o of SI as per last admission, no substance abuse hx., BIB EMS for aggression towards HHA and self-injurious behavior.    Patient continues to show good impulse control on the unit, also reporting attenuated depression and anxiety. He continues to remain somatically fixated, mostly on his GI symptoms, but this morning on his ear which he is reported to have hit on a velcro door. Suspect his GI complaints are related to eating large amounts of food very fast as reported by staff.     Plan:   -C/W Remeron and Risperdal at current dosages  -Bowel regimen PRN  -To be seen by neuropsych for evaluation this afternoon  -Dispo planning to begin

## 2021-04-06 NOTE — BH INPATIENT PSYCHIATRY PROGRESS NOTE - MSE UNSTRUCTURED FT
The patient appears stated age, dressed in hospital gowns. Mildly uncomfortable appearing, sitting in common room chair, cooperative on exam. Ear appears normal, no signs of trauma or tenderness. No PMA/ PMR noted. The patient's speech is fluent but soft with some latency. Reported mood "no complaints" with constricted affect. Thought process grossly linear. He is somatically fixated.  Thought content: denies any delusions, denies current SI/HI. No perceptual disturbance appreciated. Insight and judgment are poor. Impulse control intact at the time of exam. Oriented to self and to place.

## 2021-04-06 NOTE — BH INPATIENT PSYCHIATRY PROGRESS NOTE - NSBHMETABOLIC_PSY_ALL_CORE_FT
BMI: BMI (kg/m2): 23.4 (03-19-21 @ 01:15)  HbA1c: A1C with Estimated Average Glucose Result: 5.5 % (03-23-21 @ 09:42)    Glucose:   BP: --  Lipid Panel: Date/Time: 03-23-21 @ 09:42  Cholesterol, Serum: 172  Direct LDL: --  HDL Cholesterol, Serum: 60  Total Cholesterol/HDL Ration Measurement: --  Triglycerides, Serum: 60   BMI: BMI (kg/m2): 23.4 (03-19-21 @ 01:15)  HbA1c: A1C with Estimated Average Glucose Result: 5.5 % (03-23-21 @ 09:42)  Lipid Panel: Date/Time: 03-23-21 @ 09:42  Cholesterol, Serum: 172  HDL Cholesterol, Serum: 60  Triglycerides, Serum: 60

## 2021-04-06 NOTE — BH INPATIENT PSYCHIATRY PROGRESS NOTE - NSBHFUPINTERVALHXFT_PSY_A_CORE
Patient seen for depression and behavioral disturbance. Chart reviewed and case discussed with nursing staff. Per staff, no acute events. No PRNs.     Patient reports he hit his ear on the wall while in the bathroom while, saying he stumbled while pulling his pants up. Per MHW and RN who witnessed, the say that the patient walked into the padded velcro door on his way out of the bathroom, and did not fall and hit the wall as he reports. Pt denies any preceding dizziness or LOC. He feels well and denies any headache or pain. Otherwise he states he feels well, reports less GI discomfort this morning. Denies feeling depressed or anxious, he states he is not experiencing any hallucinations, SI or HI, and denies any thoughts of self harm.

## 2021-04-06 NOTE — BH INPATIENT PSYCHIATRY PROGRESS NOTE - PRN MEDS
MEDICATIONS  (PRN):  aluminum hydroxide/magnesium hydroxide/simethicone Suspension 30 milliLiter(s) Oral every 4 hours PRN Dyspepsia  glycerin Suppository - Adult 1 Suppository(s) Rectal daily PRN constipation  risperiDONE   Tablet 0.25 milliGRAM(s) Oral every 8 hours PRN Agitation  saline laxative (FLEET) Rectal Enema 1 Enema Rectal daily PRN constipation

## 2021-04-07 LAB — SARS-COV-2 RNA SPEC QL NAA+PROBE: SIGNIFICANT CHANGE UP

## 2021-04-07 PROCEDURE — 99232 SBSQ HOSP IP/OBS MODERATE 35: CPT

## 2021-04-07 RX ORDER — MEMANTINE HYDROCHLORIDE 10 MG/1
5 TABLET ORAL
Refills: 0 | Status: DISCONTINUED | OUTPATIENT
Start: 2021-04-07 | End: 2021-04-12

## 2021-04-07 RX ORDER — RISPERIDONE 4 MG/1
0.5 TABLET ORAL AT BEDTIME
Refills: 0 | Status: DISCONTINUED | OUTPATIENT
Start: 2021-04-07 | End: 2021-05-24

## 2021-04-07 RX ORDER — RISPERIDONE 4 MG/1
0.25 TABLET ORAL
Refills: 0 | Status: DISCONTINUED | OUTPATIENT
Start: 2021-04-07 | End: 2021-05-24

## 2021-04-07 RX ADMIN — RISPERIDONE 0.25 MILLIGRAM(S): 4 TABLET ORAL at 08:22

## 2021-04-07 RX ADMIN — Medication 1 MILLIGRAM(S): at 08:22

## 2021-04-07 RX ADMIN — Medication 1000 UNIT(S): at 08:22

## 2021-04-07 RX ADMIN — MEMANTINE HYDROCHLORIDE 5 MILLIGRAM(S): 10 TABLET ORAL at 20:35

## 2021-04-07 RX ADMIN — FINASTERIDE 5 MILLIGRAM(S): 5 TABLET, FILM COATED ORAL at 08:21

## 2021-04-07 RX ADMIN — RISPERIDONE 0.5 MILLIGRAM(S): 4 TABLET ORAL at 20:34

## 2021-04-07 RX ADMIN — AMLODIPINE BESYLATE 5 MILLIGRAM(S): 2.5 TABLET ORAL at 08:21

## 2021-04-07 RX ADMIN — MIRTAZAPINE 22.5 MILLIGRAM(S): 45 TABLET, ORALLY DISINTEGRATING ORAL at 20:34

## 2021-04-07 NOTE — CHART NOTE - NSCHARTNOTEFT_GEN_A_CORE
Consult requested for increased po intake and eating fast    Source: Patient [X]    Family [ ]     other [X]  Attending MD    Diet : Diet, DASH/TLC,   Sodium & Cholesterol Restricted  Kosher  Supplement Feeding Modality:  Oral  Ensure Enlive Cans or Servings Per Day:  1       Frequency:  Three Times a day (03-30-21 @ 10:48)    Patient reports [X] nausea  [ ] vomiting [ ] diarrhea [ ] constipation  [ ]chewing problems [ ] swallowing issues  [ ] other: eating too fast     PO intake:  < 50% [ ] 50-75% [ ]   % [X]  other :     Source for PO intake [X] Patient [ ] family [ ] chart [ ] staff [ ] other       Current Weight: 125 lbs (56.7 kg)    Pertinent medications: Vitamin D3, Risperdal, Norvasc, Folic acid, Remeron    Pertinent Labs:   None       Skin: Intact    Estimated Needs:   [X] no change since previous assessment  [ ] recalculated:       Previous Nutrition Diagnosis:     [X ] Inadequate Energy Intake [ ]Inadequate Oral Intake [ ] Excessive Energy Intake     [ ] Underweight [ ] Increased Nutrient Needs [ ] Overweight/Obesity     [ ] Altered GI Function [ ] Unintended Weight Loss [ ] Food & Nutrition Related Knowledge Deficit [ ] Malnutrition          Nutrition Diagnosis is [ ] ongoing  [X] resolved [ ] not applicable          New Nutrition Diagnosis: [ ] not applicable    [ ] Inadequate Protein Energy Intake [ ]Inadequate Oral Intake [ ] Excessive Energy Intake     [ ] Underweight [ ] Increased Nutrient Needs [ ] Overweight/Obesity     [ ] Altered GI Function [ ] Unintended Weight Loss [ ] Food & Nutrition Related Knowledge Deficit[ ] Limited Adherence to nutrition related recommendations [ ] Malnutrition  [X] other:     Excessive oral food/beverage intake      Related to:  medications that increases appetite, mental illness      As evidenced by: reports of increased po intake and eating fast     Interventions: Encouraged Pt to chew more times (count to 8). Discussed with Pt healthy eating. Pt verbalized understanding    Recommend    [ ] Change Diet To:    [ ] Nutrition Supplement    [ ] Nutrition Support    [X] Other: Continue current diet order       Monitoring and Evaluation:     [X] PO intake [ ] Tolerance to diet prescription [x] weekly weights [x] follow up per protocol    [X] other: RD/N to remain available. Gila Crespo RD/N Pager [phone]

## 2021-04-07 NOTE — BH INPATIENT PSYCHIATRY PROGRESS NOTE - CURRENT MEDICATION
MEDICATIONS  (STANDING):  amLODIPine   Tablet 5 milliGRAM(s) Oral daily  cholecalciferol 1000 Unit(s) Oral daily  finasteride 5 milliGRAM(s) Oral daily  folic acid 1 milliGRAM(s) Oral daily  mirtazapine 22.5 milliGRAM(s) Oral at bedtime  risperiDONE   Tablet 0.25 milliGRAM(s) Oral three times a day    MEDICATIONS  (PRN):  aluminum hydroxide/magnesium hydroxide/simethicone Suspension 30 milliLiter(s) Oral every 4 hours PRN Dyspepsia  glycerin Suppository - Adult 1 Suppository(s) Rectal daily PRN constipation  risperiDONE   Tablet 0.25 milliGRAM(s) Oral every 8 hours PRN Agitation  saline laxative (FLEET) Rectal Enema 1 Enema Rectal daily PRN constipation

## 2021-04-07 NOTE — BH INPATIENT PSYCHIATRY PROGRESS NOTE - MSE UNSTRUCTURED FT
The patient appears stated age, dressed in hospital gowns. Uncomfortable appearing, sitting in common room chair, cooperative on exam. Bilateral eye discharge noted. No PMA/PMR noted. The patient's speech is fluent but soft with some latency. Reported mood "fine otherwise" with constricted affect. Thought process grossly linear. He remains perservative about GI discomfort and fullness. Thought content: denies any delusions, denies current SI/HI. No perceptual disturbance appreciated. Insight and judgment are poor. Impulse control intact at the time of exam. Oriented to self and to place.  The patient appears stated age, dressed in hospital gowns. Uncomfortable appearing, sitting in common room chair, cooperative on exam. Observed grabbing walker and rushing to the bathroom. Bilateral eye discharge noted. No PMA/PMR noted. The patient's speech is fluent but soft with some latency. Reported mood "fine otherwise" with constricted affect. Thought process grossly linear. He remains perservative about GI discomfort and fullness. Thought content: denies any delusions, denies current SI/HI. No perceptual disturbance appreciated. Insight and judgment are poor. Impulse control intact at the time of exam. Oriented to self and to place.

## 2021-04-07 NOTE — BH INPATIENT PSYCHIATRY PROGRESS NOTE - NSBHMETABOLIC_PSY_ALL_CORE_FT
BMI: BMI (kg/m2): 23.4 (03-19-21 @ 01:15)  HbA1c: A1C with Estimated Average Glucose Result: 5.5 % (03-23-21 @ 09:42)    Glucose:   BP: --  Lipid Panel: Date/Time: 03-23-21 @ 09:42  Cholesterol, Serum: 172  Direct LDL: --  HDL Cholesterol, Serum: 60  Total Cholesterol/HDL Ration Measurement: --  Triglycerides, Serum: 60   BMI: BMI (kg/m2): 23.4 (03-19-21 @ 01:15)  HbA1c: A1C with Estimated Average Glucose Result: 5.5 % (03-23-21 @ 09:42)    Glucose:   Lipid Panel: Date/Time: 03-23-21 @ 09:42  Cholesterol, Serum: 172  Direct LDL: --  HDL Cholesterol, Serum: 60  Total Cholesterol/HDL Ration Measurement: --  Triglycerides, Serum: 60   BMI: BMI (kg/m2): 23.4 (03-19-21 @ 01:15)  HbA1c: A1C with Estimated Average Glucose Result: 5.5 % (03-23-21 @ 09:42)  Lipid Panel: Date/Time: 03-23-21 @ 09:42  Cholesterol, Serum: 172  Direct LDL: --  HDL Cholesterol, Serum: 60  Total Cholesterol/HDL Ration Measurement: --  Triglycerides, Serum: 60

## 2021-04-07 NOTE — BH INPATIENT PSYCHIATRY PROGRESS NOTE - NSBHFUPINTERVALHXFT_PSY_A_CORE
Patient seen for depression and behavioral disturbance. Chart reviewed and case discussed with nursing staff. Per staff, no acute events. No PRNs.  Patient found by staff this morning inducing vomiting after breakfast, which he has been known to do both here on the unit and at home according to his sister.  When asked, pt states he was having GI upset "because I ate too much", per staff he regularly eats large amounts very quickly. On interview patient remains somatically focused about his abdominal discomfort, and states his eyes are bothering him. Otherwise, pt states he "feels fine", denies depression, anxiety, SI, or thoughts of self harm.

## 2021-04-07 NOTE — CHART NOTE - NSCHARTNOTEFT_GEN_A_CORE
Pt reported to have watery discharge from left eye with complaint of discomfort.  Sclera clear conjunctiva pink with no signs infection.  Patient denies pain or itching in eyes at this time. Will offer artifical tears fro comfort, observe.

## 2021-04-07 NOTE — BH INPATIENT PSYCHIATRY PROGRESS NOTE - NSBHCHARTREVIEWVS_PSY_A_CORE FT
Vital Signs Last 24 Hrs  T(C): 36.4 (07 Apr 2021 05:46), Max: 36.7 (06 Apr 2021 15:26)  T(F): 97.6 (07 Apr 2021 05:46), Max: 98 (06 Apr 2021 15:26)  HR: --  BP: --  BP(mean): --  RR: --  SpO2: -- Vital Signs Last 24 Hrs  T(C): 36.4 (07 Apr 2021 05:46), Max: 36.7 (06 Apr 2021 15:26)  T(F): 97.6 (07 Apr 2021 05:46), Max: 98 (06 Apr 2021 15:26)    04-07-21 @ 05:46  Lying BP: 142/97 HR: 73   Sitting BP: 151/79 HR: 79

## 2021-04-07 NOTE — BH INPATIENT PSYCHIATRY PROGRESS NOTE - NSBHASSESSSUMMFT_PSY_ALL_CORE
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    4/7: Patient remains somatically focused on GI complaints, per staff these regularly arise after he eats his breakfast often quickly with multiple portions, found to induce vomiting this morning. Also has bilateral eye discharge, likely irritation from rubbing his eyes after vomiting. Aggression and self injurious behavior remain attenuated.     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    4/7: Patient remains somatically focused on GI complaints, per staff these regularly arise after he eats his breakfast often quickly with multiple portions, found to induce vomiting this morning. Also has bilateral eye discharge, likely irritation from rubbing his eyes after vomiting. Aggression and self injurious behavior remain attenuated, however staff report he is becoming more agitated and impulsive at nighttime.     Plan:   -C/W Remeron  -INCREASE nighttime Risperdal   -Bowel regimen PRN  -Careful observation during meal time by staff  -To be seen by dietician regarding pt education on digestion and importance of eating small amounts to improve GI symptoms.  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    4/7: Patient remains somatically focused on GI complaints, per staff these regularly arise after he eats his breakfast often quickly with multiple portions, found to induce vomiting this morning. Also has bilateral eye discharge, likely irritation from rubbing his eyes after vomiting. Aggression and self injurious behavior remain attenuated, however staff report he is becoming more agitated and impulsive at nighttime.     Plan:   -C/W Remeron  -INCREASE nighttime Risperdal to 0.5mg  -repeat CBC/CMP, Mg/P  -To be seen by dietician regarding pt education on digestion and importance of eating small amounts to improve GI symptoms.  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 in 1989 and most recent 07/10/19 followed by [facility] but withdrew after the initial intake ; h/o of SI as per last admission, no substance abuse hx., BIB EMS for aggression towards HHA and self-injurious behavior.    4/7: Patient remains somatically focused on GI complaints, per staff these regularly arise after he eats his breakfast often quickly with multiple portions, found to induce vomiting this morning. Also has bilateral eye discharge, likely irritation from rubbing his eyes after vomiting. Aggression and self injurious behavior remain attenuated, however staff report he is becoming more agitated and impulsive at nighttime.     Plan:   -C/W Remeron  -INCREASE nighttime Risperdal to 0.5mg, Add Memantine 5mg BID  -repeat CBC/CMP, Mg/P  -To be seen by dietician regarding pt education on digestion and importance of eating small amounts to improve GI symptoms.  -Hospitalist consulted for eye discharge, will be seen today and follows recs  -Dispo planning to begin

## 2021-04-08 LAB
ALBUMIN SERPL ELPH-MCNC: 3.7 G/DL — SIGNIFICANT CHANGE UP (ref 3.3–5)
ALP SERPL-CCNC: 40 U/L — SIGNIFICANT CHANGE UP (ref 40–120)
ALT FLD-CCNC: 15 U/L — SIGNIFICANT CHANGE UP (ref 4–41)
ANION GAP SERPL CALC-SCNC: 11 MMOL/L — SIGNIFICANT CHANGE UP (ref 7–14)
AST SERPL-CCNC: 21 U/L — SIGNIFICANT CHANGE UP (ref 4–40)
BASOPHILS # BLD AUTO: 0.03 K/UL — SIGNIFICANT CHANGE UP (ref 0–0.2)
BASOPHILS NFR BLD AUTO: 0.5 % — SIGNIFICANT CHANGE UP (ref 0–2)
BILIRUB SERPL-MCNC: 0.3 MG/DL — SIGNIFICANT CHANGE UP (ref 0.2–1.2)
BUN SERPL-MCNC: 17 MG/DL — SIGNIFICANT CHANGE UP (ref 7–23)
CALCIUM SERPL-MCNC: 8.8 MG/DL — SIGNIFICANT CHANGE UP (ref 8.4–10.5)
CHLORIDE SERPL-SCNC: 101 MMOL/L — SIGNIFICANT CHANGE UP (ref 98–107)
CO2 SERPL-SCNC: 24 MMOL/L — SIGNIFICANT CHANGE UP (ref 22–31)
CREAT SERPL-MCNC: 0.8 MG/DL — SIGNIFICANT CHANGE UP (ref 0.5–1.3)
EOSINOPHIL # BLD AUTO: 0.2 K/UL — SIGNIFICANT CHANGE UP (ref 0–0.5)
EOSINOPHIL NFR BLD AUTO: 3.4 % — SIGNIFICANT CHANGE UP (ref 0–6)
GLUCOSE SERPL-MCNC: 78 MG/DL — SIGNIFICANT CHANGE UP (ref 70–99)
HCT VFR BLD CALC: 37.3 % — LOW (ref 39–50)
HGB BLD-MCNC: 11.8 G/DL — LOW (ref 13–17)
IANC: 3.66 K/UL — SIGNIFICANT CHANGE UP (ref 1.5–8.5)
IMM GRANULOCYTES NFR BLD AUTO: 0.3 % — SIGNIFICANT CHANGE UP (ref 0–1.5)
LYMPHOCYTES # BLD AUTO: 1.55 K/UL — SIGNIFICANT CHANGE UP (ref 1–3.3)
LYMPHOCYTES # BLD AUTO: 26.2 % — SIGNIFICANT CHANGE UP (ref 13–44)
MAGNESIUM SERPL-MCNC: 2.1 MG/DL — SIGNIFICANT CHANGE UP (ref 1.6–2.6)
MCHC RBC-ENTMCNC: 29.1 PG — SIGNIFICANT CHANGE UP (ref 27–34)
MCHC RBC-ENTMCNC: 31.6 GM/DL — LOW (ref 32–36)
MCV RBC AUTO: 91.9 FL — SIGNIFICANT CHANGE UP (ref 80–100)
MONOCYTES # BLD AUTO: 0.46 K/UL — SIGNIFICANT CHANGE UP (ref 0–0.9)
MONOCYTES NFR BLD AUTO: 7.8 % — SIGNIFICANT CHANGE UP (ref 2–14)
NEUTROPHILS # BLD AUTO: 3.66 K/UL — SIGNIFICANT CHANGE UP (ref 1.8–7.4)
NEUTROPHILS NFR BLD AUTO: 61.8 % — SIGNIFICANT CHANGE UP (ref 43–77)
NRBC # BLD: 0 /100 WBCS — SIGNIFICANT CHANGE UP
NRBC # FLD: 0 K/UL — SIGNIFICANT CHANGE UP
PHOSPHATE SERPL-MCNC: 3.8 MG/DL — SIGNIFICANT CHANGE UP (ref 2.5–4.5)
PLATELET # BLD AUTO: 195 K/UL — SIGNIFICANT CHANGE UP (ref 150–400)
POTASSIUM SERPL-MCNC: 3.9 MMOL/L — SIGNIFICANT CHANGE UP (ref 3.5–5.3)
POTASSIUM SERPL-SCNC: 3.9 MMOL/L — SIGNIFICANT CHANGE UP (ref 3.5–5.3)
PROT SERPL-MCNC: 7 G/DL — SIGNIFICANT CHANGE UP (ref 6–8.3)
RBC # BLD: 4.06 M/UL — LOW (ref 4.2–5.8)
RBC # FLD: 13 % — SIGNIFICANT CHANGE UP (ref 10.3–14.5)
SODIUM SERPL-SCNC: 136 MMOL/L — SIGNIFICANT CHANGE UP (ref 135–145)
WBC # BLD: 5.92 K/UL — SIGNIFICANT CHANGE UP (ref 3.8–10.5)
WBC # FLD AUTO: 5.92 K/UL — SIGNIFICANT CHANGE UP (ref 3.8–10.5)

## 2021-04-08 PROCEDURE — 99232 SBSQ HOSP IP/OBS MODERATE 35: CPT

## 2021-04-08 RX ADMIN — Medication 1 MILLIGRAM(S): at 09:43

## 2021-04-08 RX ADMIN — Medication 1000 UNIT(S): at 09:42

## 2021-04-08 RX ADMIN — RISPERIDONE 0.25 MILLIGRAM(S): 4 TABLET ORAL at 09:42

## 2021-04-08 RX ADMIN — Medication 1 DROP(S): at 13:24

## 2021-04-08 RX ADMIN — RISPERIDONE 0.25 MILLIGRAM(S): 4 TABLET ORAL at 17:11

## 2021-04-08 RX ADMIN — MIRTAZAPINE 22.5 MILLIGRAM(S): 45 TABLET, ORALLY DISINTEGRATING ORAL at 20:15

## 2021-04-08 RX ADMIN — RISPERIDONE 0.5 MILLIGRAM(S): 4 TABLET ORAL at 20:16

## 2021-04-08 RX ADMIN — MEMANTINE HYDROCHLORIDE 5 MILLIGRAM(S): 10 TABLET ORAL at 09:42

## 2021-04-08 RX ADMIN — Medication 30 MILLILITER(S): at 16:48

## 2021-04-08 RX ADMIN — AMLODIPINE BESYLATE 5 MILLIGRAM(S): 2.5 TABLET ORAL at 09:43

## 2021-04-08 RX ADMIN — FINASTERIDE 5 MILLIGRAM(S): 5 TABLET, FILM COATED ORAL at 09:42

## 2021-04-08 RX ADMIN — MEMANTINE HYDROCHLORIDE 5 MILLIGRAM(S): 10 TABLET ORAL at 20:15

## 2021-04-08 NOTE — BH INPATIENT PSYCHIATRY PROGRESS NOTE - NSBHASSESSSUMMFT_PSY_ALL_CORE
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      4/8/21: Patient less fixated on his GI complaints at time of interview, although this is likely because he was seen before finishing his meal. Eye complaints resolved, medicine recs appreciated if they recur. He continues to show attenuated depression and self injurious behavior, and can be seen smiling and occasionally joking with peers. However he continues to show signs of cognitive decline (MMSE 21), awaiting neuropsych formal report. Bloodwork to be drawn today. Spoke with pt's sister who is in agreement that the patient is no longer safe caring for himself, and would benefit for planning to discharge to a nursing facility.      Plan:  -C/W medications at current dosages  -Added opthalmic drops prn per medicine recs  -F/U CBC/CMP, Mg/P

## 2021-04-08 NOTE — BH INPATIENT PSYCHIATRY PROGRESS NOTE - CURRENT MEDICATION
MEDICATIONS  (STANDING):  amLODIPine   Tablet 5 milliGRAM(s) Oral daily  cholecalciferol 1000 Unit(s) Oral daily  finasteride 5 milliGRAM(s) Oral daily  folic acid 1 milliGRAM(s) Oral daily  memantine 5 milliGRAM(s) Oral two times a day  mirtazapine 22.5 milliGRAM(s) Oral at bedtime  risperiDONE   Tablet 0.25 milliGRAM(s) Oral <User Schedule>  risperiDONE   Tablet 0.5 milliGRAM(s) Oral at bedtime    MEDICATIONS  (PRN):  aluminum hydroxide/magnesium hydroxide/simethicone Suspension 30 milliLiter(s) Oral every 4 hours PRN Dyspepsia  artificial  tears Solution 1 Drop(s) Both EYES four times a day PRN eye irritation  glycerin Suppository - Adult 1 Suppository(s) Rectal daily PRN constipation  risperiDONE   Tablet 0.25 milliGRAM(s) Oral every 8 hours PRN Agitation  saline laxative (FLEET) Rectal Enema 1 Enema Rectal daily PRN constipation

## 2021-04-08 NOTE — BH INPATIENT PSYCHIATRY PROGRESS NOTE - PRN MEDS
MEDICATIONS  (PRN):  aluminum hydroxide/magnesium hydroxide/simethicone Suspension 30 milliLiter(s) Oral every 4 hours PRN Dyspepsia  artificial  tears Solution 1 Drop(s) Both EYES four times a day PRN eye irritation  glycerin Suppository - Adult 1 Suppository(s) Rectal daily PRN constipation  risperiDONE   Tablet 0.25 milliGRAM(s) Oral every 8 hours PRN Agitation  saline laxative (FLEET) Rectal Enema 1 Enema Rectal daily PRN constipation

## 2021-04-08 NOTE — BH CHART NOTE - NSPSYPRGNOTEFT_PSY_ALL_CORE
Patient was approached to discuss his eating habits and to explore his insight, motivation and openness to behavioral modification. He made poor eye contact, did  not want to accompany the writer to a private area to talk and spoke in short, barely audible clips. He acknowledged that he eats too quickly and that he tries julienne avoid eating so that he can avoid "number 2" i.e., moving his bowels. He could not explain why he wants to avoid bowel movements but said that he "was always like this". Writer attempted psychoeducation and tried to engage the patient in a discussion about somatic expression of emotional problems but he was not engaged. He was told that we would try to think of ways to help him normalize his eating (he sometimes tries to skip meals but then rebounds with a voracious appetite and very rapid eating).    Staff discussed behavioral measures that may help to slow the pace of his eating, but most of these would require dedicated 1:1 attention during his meals and may not be possible within the structure of the unit. The possibility of [facility] at least monitoring his eating behavior and not serving him all courses at once was suggested. 68.5

## 2021-04-08 NOTE — BH INPATIENT PSYCHIATRY PROGRESS NOTE - NSBHFUPINTERVALHXFT_PSY_A_CORE
Patient seen for depression and behavioral disturbance. Chart reviewed and case discussed with nursing staff. Per staff, no acute events. No PRNs.    Interviewed patient while eating breakfast. He states he remembers speaking with the dietician, but is continuing to eat large amounts of food very quickly. This morning, he denies any abdominal cramps or belching, saying he feels improved compared to yesterday. He also denies any eye discharge, pain, or redness. Last BM was yesterday. He denies feeling depressed or anxious. He denies any SI, thoughts of self harm, or hallucinations.

## 2021-04-08 NOTE — BH INPATIENT PSYCHIATRY PROGRESS NOTE - MSE UNSTRUCTURED FT
The patient appears stated age, dressed in hospital gowns. Cooperative, but gives one word answers. Focused on eating his breakfast, not using utensils and dropping food on his gown. No eye discharge. No PMA/PMR noted. The patient's speech is fluent but soft with some latency. Reported mood "good" with constricted affect. Thought process grossly linear. Perservative on eating. Thought content: denies any delusions, denies current SI/HI. No perceptual disturbance appreciated. Insight and judgment are poor. Impulse control intact at the time of exam. Oriented to self and to place.

## 2021-04-08 NOTE — BH INPATIENT PSYCHIATRY PROGRESS NOTE - NSBHCHARTREVIEWVS_PSY_A_CORE FT
Vital Signs Last 24 Hrs  T(C): 36.1 (08 Apr 2021 07:32), Max: 36.7 (07 Apr 2021 15:17)  T(F): 97 (08 Apr 2021 07:32), Max: 98.1 (07 Apr 2021 15:17)  RR: 17 (08 Apr 2021 07:32) (17 - 17)  Sitting BP: 112/66 HR: 63  Standing BP: 116/74 HR: 80

## 2021-04-09 PROCEDURE — 99232 SBSQ HOSP IP/OBS MODERATE 35: CPT

## 2021-04-09 RX ADMIN — MIRTAZAPINE 22.5 MILLIGRAM(S): 45 TABLET, ORALLY DISINTEGRATING ORAL at 20:59

## 2021-04-09 RX ADMIN — RISPERIDONE 0.25 MILLIGRAM(S): 4 TABLET ORAL at 08:37

## 2021-04-09 RX ADMIN — MEMANTINE HYDROCHLORIDE 5 MILLIGRAM(S): 10 TABLET ORAL at 20:58

## 2021-04-09 RX ADMIN — RISPERIDONE 0.25 MILLIGRAM(S): 4 TABLET ORAL at 17:44

## 2021-04-09 RX ADMIN — RISPERIDONE 0.5 MILLIGRAM(S): 4 TABLET ORAL at 20:59

## 2021-04-09 RX ADMIN — MEMANTINE HYDROCHLORIDE 5 MILLIGRAM(S): 10 TABLET ORAL at 08:37

## 2021-04-09 RX ADMIN — Medication 1000 UNIT(S): at 08:36

## 2021-04-09 RX ADMIN — AMLODIPINE BESYLATE 5 MILLIGRAM(S): 2.5 TABLET ORAL at 08:37

## 2021-04-09 RX ADMIN — FINASTERIDE 5 MILLIGRAM(S): 5 TABLET, FILM COATED ORAL at 08:36

## 2021-04-09 RX ADMIN — Medication 1 MILLIGRAM(S): at 08:37

## 2021-04-09 NOTE — BH INPATIENT PSYCHIATRY PROGRESS NOTE - CASE SUMMARY
70 year-old male admitted for aggression towards HHA and self-injurious behavior. On evaluation today, pt once again c/o abdominal discomfort and fullness; perseverative about GI discomfort during interview, hard to re-direct; confused about when he had his last BM. Hospitalist re-consulted, will f/u recommendations; rest of the plan as documented in medical student's note. 
70 year-old male admitted for aggression towards HHA and self-injurious behavior. On evaluation today, pt complains of abdominal discomfort and fullness; belching repeatedly during interview; last BM on Saturday, Dulcolax adde and hospitalist consulted, will f/u recommendations; rest of the plan as documented in medical student's note. 
70 year-old male admitted for aggression towards HHA and self-injurious behavior. On evaluation today, pt once again c/o abdominal discomfort and fullness; perseverative about GI discomfort during interview, hard to re-direct; confused about when he had his last BM. Hospitalist re-consulted, will f/u recommendations; rest of the plan as documented in medical student's note. 
70 year-old male admitted for aggression towards HHA and self-injurious behavior. Now in better behavioral control with improved appetite and sleep. Plan as documented in medical student's note. 
70 year-old male admitted for aggression towards HHA and self-injurious behavior. On evaluation today, pt once again c/o abdominal discomfort and fullness; perseverative about GI discomfort during interview, hard to re-direct. Per staff, pt always asking for more food. Will consult dietician regarding educating patient about digestion and eating small amounts; rest of the plan as documented in medical student's note.
70 year-old male admitted for aggression towards HHA and self-injurious behavior. On evaluation today, less somatic, does not c/o abdominal discomfort and fullness; eye discharge has improved. Dispo- looking into SNF placement. Rest of the plan as documented in medical student's note.
70 year-old male admitted for aggression towards HHA and self-injurious behavior. On evaluation today, pt once again c/o abdominal discomfort and fullness; also has b/l eye discharge. Per staff, pt always asking for more food and threw up after breakfast this morning (self-induced). Dietician consulted regarding educating patient about digestion and eating small amounts; hospitalist consulted about eye discharge, rest of the plan as documented in medical student's note.  
Agree

## 2021-04-09 NOTE — BH INPATIENT PSYCHIATRY PROGRESS NOTE - MODIFICATIONS
As noted below
None
As noted below
As mentioned below
As noted below
None

## 2021-04-09 NOTE — BH INPATIENT PSYCHIATRY PROGRESS NOTE - NSBHCHARTREVIEWVS_PSY_A_CORE FT
Vital Signs Last 24 Hrs  T(C): 36.1 (09 Apr 2021 07:38), Max: 36.9 (08 Apr 2021 15:49)  T(F): 97 (09 Apr 2021 07:38), Max: 98.4 (08 Apr 2021 15:49)  HR: --  BP: --  BP(mean): --  RR: 18 (09 Apr 2021 07:38) (18 - 18)  SpO2: --

## 2021-04-09 NOTE — BH SAFETY PLAN - STEP 6 SAFE ENVIRONMENT
Mr. Cota was observed to respond well to social support in his environment, particularly during meal times

## 2021-04-09 NOTE — BH INPATIENT PSYCHIATRY PROGRESS NOTE - NSBHFUPINTERVALHXFT_PSY_A_CORE
Patient seen during the morning rounds for depression and behavioral disturbance with the attending Psychiatrist. Chart reviewed and case discussed with nursing staff. Per staff, no acute events. No PRNs.  Interviewed patient while eating breakfast. He states he remembers speaking with the dietician, but is continuing to eat large amounts of food very quickly. This morning, he denies any abdominal cramps or belching, saying he feels improved compared to yesterday. He also denies any eye discharge, pain, or redness. Last BM was yesterday. He denies feeling depressed or anxious. He denies any SI, thoughts of self harm, or hallucinations.

## 2021-04-10 RX ADMIN — RISPERIDONE 0.5 MILLIGRAM(S): 4 TABLET ORAL at 21:05

## 2021-04-10 RX ADMIN — FINASTERIDE 5 MILLIGRAM(S): 5 TABLET, FILM COATED ORAL at 08:10

## 2021-04-10 RX ADMIN — MEMANTINE HYDROCHLORIDE 5 MILLIGRAM(S): 10 TABLET ORAL at 08:10

## 2021-04-10 RX ADMIN — Medication 1 MILLIGRAM(S): at 08:11

## 2021-04-10 RX ADMIN — Medication 1000 UNIT(S): at 08:10

## 2021-04-10 RX ADMIN — RISPERIDONE 0.25 MILLIGRAM(S): 4 TABLET ORAL at 16:52

## 2021-04-10 RX ADMIN — MEMANTINE HYDROCHLORIDE 5 MILLIGRAM(S): 10 TABLET ORAL at 21:05

## 2021-04-10 RX ADMIN — AMLODIPINE BESYLATE 5 MILLIGRAM(S): 2.5 TABLET ORAL at 08:10

## 2021-04-10 RX ADMIN — RISPERIDONE 0.25 MILLIGRAM(S): 4 TABLET ORAL at 08:10

## 2021-04-10 RX ADMIN — MIRTAZAPINE 22.5 MILLIGRAM(S): 45 TABLET, ORALLY DISINTEGRATING ORAL at 21:05

## 2021-04-11 RX ADMIN — MEMANTINE HYDROCHLORIDE 5 MILLIGRAM(S): 10 TABLET ORAL at 20:36

## 2021-04-11 RX ADMIN — RISPERIDONE 0.5 MILLIGRAM(S): 4 TABLET ORAL at 20:37

## 2021-04-11 RX ADMIN — MEMANTINE HYDROCHLORIDE 5 MILLIGRAM(S): 10 TABLET ORAL at 08:25

## 2021-04-11 RX ADMIN — AMLODIPINE BESYLATE 5 MILLIGRAM(S): 2.5 TABLET ORAL at 08:25

## 2021-04-11 RX ADMIN — FINASTERIDE 5 MILLIGRAM(S): 5 TABLET, FILM COATED ORAL at 08:24

## 2021-04-11 RX ADMIN — RISPERIDONE 0.25 MILLIGRAM(S): 4 TABLET ORAL at 16:50

## 2021-04-11 RX ADMIN — MIRTAZAPINE 22.5 MILLIGRAM(S): 45 TABLET, ORALLY DISINTEGRATING ORAL at 20:36

## 2021-04-11 RX ADMIN — RISPERIDONE 0.25 MILLIGRAM(S): 4 TABLET ORAL at 08:25

## 2021-04-11 RX ADMIN — Medication 1 MILLIGRAM(S): at 08:25

## 2021-04-11 RX ADMIN — Medication 1000 UNIT(S): at 08:25

## 2021-04-12 PROCEDURE — 99232 SBSQ HOSP IP/OBS MODERATE 35: CPT

## 2021-04-12 RX ORDER — MEMANTINE HYDROCHLORIDE 10 MG/1
10 TABLET ORAL
Refills: 0 | Status: DISCONTINUED | OUTPATIENT
Start: 2021-04-12 | End: 2021-05-24

## 2021-04-12 RX ADMIN — RISPERIDONE 0.5 MILLIGRAM(S): 4 TABLET ORAL at 20:19

## 2021-04-12 RX ADMIN — Medication 30 MILLILITER(S): at 08:18

## 2021-04-12 RX ADMIN — MEMANTINE HYDROCHLORIDE 5 MILLIGRAM(S): 10 TABLET ORAL at 09:08

## 2021-04-12 RX ADMIN — RISPERIDONE 0.25 MILLIGRAM(S): 4 TABLET ORAL at 17:40

## 2021-04-12 RX ADMIN — MIRTAZAPINE 22.5 MILLIGRAM(S): 45 TABLET, ORALLY DISINTEGRATING ORAL at 20:18

## 2021-04-12 RX ADMIN — MEMANTINE HYDROCHLORIDE 10 MILLIGRAM(S): 10 TABLET ORAL at 20:18

## 2021-04-12 RX ADMIN — FINASTERIDE 5 MILLIGRAM(S): 5 TABLET, FILM COATED ORAL at 09:07

## 2021-04-12 RX ADMIN — Medication 1 MILLIGRAM(S): at 09:07

## 2021-04-12 RX ADMIN — RISPERIDONE 0.25 MILLIGRAM(S): 4 TABLET ORAL at 09:07

## 2021-04-12 RX ADMIN — AMLODIPINE BESYLATE 5 MILLIGRAM(S): 2.5 TABLET ORAL at 09:07

## 2021-04-12 RX ADMIN — Medication 1000 UNIT(S): at 09:07

## 2021-04-12 NOTE — BH INPATIENT PSYCHIATRY PROGRESS NOTE - MSE UNSTRUCTURED FT
The patient appears stated age, dressed in hospital gowns. Cooperative, but gives monosyllabic answers. Focused on eating his breakfast, not using utensils and dropping food on his gown. No eye discharge. No PMA/PMR noted. The patient's speech is fluent but soft with some latency. Reported mood "ok" with constricted affect. Thought process grossly preservative. Thought content: denies any delusions, denies current SI/HI. No perceptual disturbance appreciated. Insight and judgment are poor. Impulse control intact at the time of exam. Oriented to self and to place.

## 2021-04-12 NOTE — BH INPATIENT PSYCHIATRY PROGRESS NOTE - NSBHASSESSSUMMFT_PSY_ALL_CORE
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    Patient less fixated on his GI complaints at time of interview; eye complaints resolved. 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    Plan:  -Will increase Memantine to 10mg BID and continue with rest of the medications at current dosages. Dispo to SNF once stable.

## 2021-04-12 NOTE — BH INPATIENT PSYCHIATRY PROGRESS NOTE - NSBHFUPINTERVALHXFT_PSY_A_CORE
Patient seen for depression and behavioral disturbance. Chart reviewed and case discussed with nursing staff. Per staff, no acute events. No PRNs.  On encounter today, patient reports his mood as "ok", states he ate all his breakfast, denies any abdominal cramps or belching, saying he feels improved compared to last week. He also denies any eye discharge, pain, or redness. Patient denies feeling depressed or anxious. He denies any SI, thoughts of self harm, or hallucinations. Patient remains med compliant.

## 2021-04-12 NOTE — BH INPATIENT PSYCHIATRY PROGRESS NOTE - NSBHCHARTREVIEWVS_PSY_A_CORE FT
Vital Signs Last 24 Hrs  T(C): 36 (12 Apr 2021 07:47), Max: 37 (11 Apr 2021 15:24)  T(F): 96.8 (12 Apr 2021 07:47), Max: 98.6 (11 Apr 2021 15:24)  Orthostatic VS  04-12-21 @ 07:47  Sitting BP: 149/78 HR: 62  Standing BP: 153/89 HR: 73  Site: upper left arm   Mode: electronic

## 2021-04-12 NOTE — BH INPATIENT PSYCHIATRY PROGRESS NOTE - NSBHMETABOLIC_PSY_ALL_CORE_FT
BMI: BMI (kg/m2): 23.4 (03-19-21 @ 01:15)  HbA1c: A1C with Estimated Average Glucose Result: 5.5 % (03-23-21 @ 09:42)  Lipid Panel: Date/Time: 03-23-21 @ 09:42  Cholesterol, Serum: 172  HDL Cholesterol, Serum: 60  Triglycerides, Serum: 60

## 2021-04-13 PROCEDURE — 99232 SBSQ HOSP IP/OBS MODERATE 35: CPT

## 2021-04-13 RX ADMIN — Medication 1000 UNIT(S): at 09:11

## 2021-04-13 RX ADMIN — MEMANTINE HYDROCHLORIDE 10 MILLIGRAM(S): 10 TABLET ORAL at 20:33

## 2021-04-13 RX ADMIN — AMLODIPINE BESYLATE 5 MILLIGRAM(S): 2.5 TABLET ORAL at 09:11

## 2021-04-13 RX ADMIN — RISPERIDONE 0.25 MILLIGRAM(S): 4 TABLET ORAL at 17:35

## 2021-04-13 RX ADMIN — MEMANTINE HYDROCHLORIDE 10 MILLIGRAM(S): 10 TABLET ORAL at 09:11

## 2021-04-13 RX ADMIN — Medication 1 MILLIGRAM(S): at 09:11

## 2021-04-13 RX ADMIN — RISPERIDONE 0.25 MILLIGRAM(S): 4 TABLET ORAL at 09:11

## 2021-04-13 RX ADMIN — RISPERIDONE 0.5 MILLIGRAM(S): 4 TABLET ORAL at 20:33

## 2021-04-13 RX ADMIN — FINASTERIDE 5 MILLIGRAM(S): 5 TABLET, FILM COATED ORAL at 09:11

## 2021-04-13 RX ADMIN — MIRTAZAPINE 22.5 MILLIGRAM(S): 45 TABLET, ORALLY DISINTEGRATING ORAL at 20:33

## 2021-04-13 NOTE — BH INPATIENT PSYCHIATRY PROGRESS NOTE - NSBHCHARTREVIEWVS_PSY_A_CORE FT
Vital Signs Last 24 Hrs  T(C): 36.3 (13 Apr 2021 07:47), Max: 36.3 (13 Apr 2021 07:47)  T(F): 97.3 (13 Apr 2021 07:47), Max: 97.3 (13 Apr 2021 07:47)  RR: 17 (13 Apr 2021 07:47) (17 - 17)  Orthostatic VS  04-13-21 @ 07:47  Sitting BP: 135/80 HR: 61  Standing BP: 138/80 HR: 70

## 2021-04-13 NOTE — BH INPATIENT PSYCHIATRY PROGRESS NOTE - NSBHASSESSSUMMFT_PSY_ALL_CORE
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  On evaluation, patient continues to be fixated on his GI complaints; otherwise no other concerns reported or in evidence.   Plan: Will c/w Memantine, Mirtazapine and Risperidone at current dosages. Dispo to SNF once stable.

## 2021-04-13 NOTE — BH INPATIENT PSYCHIATRY PROGRESS NOTE - CURRENT MEDICATION
MEDICATIONS  (STANDING):  amLODIPine   Tablet 5 milliGRAM(s) Oral daily  cholecalciferol 1000 Unit(s) Oral daily  finasteride 5 milliGRAM(s) Oral daily  folic acid 1 milliGRAM(s) Oral daily  memantine 10 milliGRAM(s) Oral two times a day  mirtazapine 22.5 milliGRAM(s) Oral at bedtime  risperiDONE   Tablet 0.25 milliGRAM(s) Oral <User Schedule>  risperiDONE   Tablet 0.5 milliGRAM(s) Oral at bedtime    MEDICATIONS  (PRN):  aluminum hydroxide/magnesium hydroxide/simethicone Suspension 30 milliLiter(s) Oral every 4 hours PRN Dyspepsia  artificial  tears Solution 1 Drop(s) Both EYES four times a day PRN eye irritation  glycerin Suppository - Adult 1 Suppository(s) Rectal daily PRN constipation  risperiDONE   Tablet 0.25 milliGRAM(s) Oral every 8 hours PRN Agitation  saline laxative (FLEET) Rectal Enema 1 Enema Rectal daily PRN constipation

## 2021-04-13 NOTE — BH INPATIENT PSYCHIATRY PROGRESS NOTE - NSBHFUPINTERVALHXFT_PSY_A_CORE
Patient seen for depression and behavioral disturbance. Chart reviewed and case discussed with nursing staff. Per staff, no acute events. No PRNs.  Patient seen after breakfast this morning. He reports his mood as "ok", remains focused on eating and GI symptoms--tells me he ate too much too fast and now he is full. Patient denies feeling depressed or anxious. He denies any SI/ HI. No hallucinations reported. Patient remains med compliant.

## 2021-04-13 NOTE — BH INPATIENT PSYCHIATRY PROGRESS NOTE - MSE UNSTRUCTURED FT
The patient appears stated age, dressed in hospital gowns. Cooperative, but gives monosyllabic answers. Focused on eating his breakfast, not using utensils and dropping food on his gown. No eye discharge. No PMA/PMR noted. The patient's speech is fluent but soft with some latency. Reported mood "ok" with constricted affect. Thought process grossly preservative. Thought content: denies any delusions, denies current SI/HI. No perceptual disturbance appreciated. Insight and judgment are poor. Impulse control intact at the time of exam. Oriented to self, place, month, date and year.

## 2021-04-14 PROCEDURE — 99232 SBSQ HOSP IP/OBS MODERATE 35: CPT

## 2021-04-14 RX ADMIN — MEMANTINE HYDROCHLORIDE 10 MILLIGRAM(S): 10 TABLET ORAL at 08:34

## 2021-04-14 RX ADMIN — AMLODIPINE BESYLATE 5 MILLIGRAM(S): 2.5 TABLET ORAL at 08:34

## 2021-04-14 RX ADMIN — Medication 1 MILLIGRAM(S): at 08:34

## 2021-04-14 RX ADMIN — RISPERIDONE 0.25 MILLIGRAM(S): 4 TABLET ORAL at 08:33

## 2021-04-14 RX ADMIN — RISPERIDONE 0.5 MILLIGRAM(S): 4 TABLET ORAL at 20:45

## 2021-04-14 RX ADMIN — Medication 30 MILLILITER(S): at 09:37

## 2021-04-14 RX ADMIN — FINASTERIDE 5 MILLIGRAM(S): 5 TABLET, FILM COATED ORAL at 08:34

## 2021-04-14 RX ADMIN — Medication 1000 UNIT(S): at 08:34

## 2021-04-14 RX ADMIN — MIRTAZAPINE 22.5 MILLIGRAM(S): 45 TABLET, ORALLY DISINTEGRATING ORAL at 20:44

## 2021-04-14 RX ADMIN — RISPERIDONE 0.25 MILLIGRAM(S): 4 TABLET ORAL at 18:10

## 2021-04-14 RX ADMIN — MEMANTINE HYDROCHLORIDE 10 MILLIGRAM(S): 10 TABLET ORAL at 20:44

## 2021-04-14 NOTE — UM REPORT PROGRESS NOTE - NSUMSWNOTE_GEN_A_CORE FT
patient has complied with treatment  and improved but his cognitive continued to be questionable. accordingly, he was evaluated with neuropsy 4/6 - with additional diagnosis of Frontotemporal degeneration.   team also plans for MRI.  patient unable to return home requires placement .. pt evaluated with neuropsy 4/6 - with additional diagnosis of Frontotemporal degeneration.   MRI completed - reportedly noted  changes.     patient unable to return home requires snf placement.      patient participated and approved with PASSR to snf.  patient participated with on site interview by snf on 4/26     patient was accepted to SNF due to dementia however MD/family/patient agreed to ECT to improve mental status.  Patient to start ECT this week.      patient was accepted to SNF due to dementia however MD/family/patient agreed to ECT to improve mental status.   Patient declined ECT for 5/5 . sw interfaced with family and patient agrees again.  Will start tomorrow 5/7     patient was accepted to SNF due to dementia however MD/family/patient agreed to ECT to improve mental status.   Patient has started ECT. Patient continues to be somatic about food  - stating he is hungry  - when he has just eaten.  then stating his stomach hurts because he ate too much.      patient was previously approved [facility] and accepted to SNF then dc plan postponed with new treatment plan of ECT.  Patient's authorization for placement in SNF expires May 31   patient was accepted [facility] due to dementia then MD/family/patient agreed to ECT to improve mental status.   Patient has started ECT. Patient continues to be somatic about food  - stating he is hungry  - when he has just eaten.  then stating his stomach hurts because he ate too much.      patient was previously approved ASCEND and accepted to SNF then dc plan postponed with new treatment plan of ECT.  Patient's authorization for placement in SNF expires May 31    [facility] is out of network but agreed to work with insurance  - if insurance would agree (which insurance did agree)  as this SNF is culturally/religiously appropriate for patient and there is none other in [place] similar to this SNF - plus this is family first choice/ and patient's  consults at this facility.    patient was accepted to [facility] due to dementia then MD/family/patient agreed to ECT to improve mental status.  .  SNF is aware of auth for SNF expires May 31 and had promised a bed with advanced notice.  Patient has shown no progress and team has determined to cancel ECT and resume plan for placement.     as previously noted: patient was previously approved [facility] and accepted to SNF then dc plan postponed with new treatment plan of ECT.  Patient's authorization for placement in SNF expires May 31  [facility] is out of network but agreed to work with insurance  - if insurance would agree (which insurance did agree)  as this SNF is culturally/religiously appropriate for patient and there is none other in [place] similar to this SNF - plus this is family first choice/ and patient's  consults at this facility.

## 2021-04-14 NOTE — BH INPATIENT PSYCHIATRY PROGRESS NOTE - NSBHASSESSSUMMFT_PSY_ALL_CORE
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  Recent neuropsych eval consistent with fronto-temporal dementia (FTD)  On evaluation, patient continues to be fixated on his GI complaints; otherwise no self-injurious behavior or other behavioral concerns. Based and current symptoms, needs and diagnosis of a progressive form of dementia, FTD, pt will benefit from placement at SNF.   Plan: Will c/w Memantine, Mirtazapine and Risperidone at current dosages. Dispo to SNF once stable; plan discussed with sister who is in agreement.

## 2021-04-14 NOTE — BH INPATIENT PSYCHIATRY PROGRESS NOTE - NSBHFUPINTERVALHXFT_PSY_A_CORE
Patient seen for depression and behavioral disturbance. Chart reviewed and case discussed with nursing staff. Per staff, no acute events. No PRNs.  Patient seen after breakfast this morning. He reports his mood as "ok", remains focused on eating and GI symptoms--once again tells me he ate too much too fast. Patient denies feeling depressed or anxious. He denies any SI/ HI. No hallucinations reported. Patient remains med compliant.   Contact made with patient's sister today and writer updated her on pt's progress and diagnosis.

## 2021-04-14 NOTE — BH INPATIENT PSYCHIATRY PROGRESS NOTE - NSBHCHARTREVIEWVS_PSY_A_CORE FT
Vital Signs Last 24 Hrs  T(C): 36.8 (14 Apr 2021 08:54), Max: 36.8 (14 Apr 2021 08:54)  T(F): 98.2 (14 Apr 2021 08:54), Max: 98.2 (14 Apr 2021 08:54)  Orthostatic VS  04-14-21 @ 08:54  Sitting BP: 124/66 HR: 60  Standing BP: 134/74 HR: 78

## 2021-04-14 NOTE — BH INPATIENT PSYCHIATRY PROGRESS NOTE - CURRENT MEDICATION
MEDICATIONS  (STANDING):  amLODIPine   Tablet 5 milliGRAM(s) Oral daily  cholecalciferol 1000 Unit(s) Oral daily  finasteride 5 milliGRAM(s) Oral daily  folic acid 1 milliGRAM(s) Oral daily  memantine 10 milliGRAM(s) Oral two times a day  mirtazapine 22.5 milliGRAM(s) Oral at bedtime  risperiDONE   Tablet 0.5 milliGRAM(s) Oral at bedtime  risperiDONE   Tablet 0.25 milliGRAM(s) Oral <User Schedule>    MEDICATIONS  (PRN):  aluminum hydroxide/magnesium hydroxide/simethicone Suspension 30 milliLiter(s) Oral every 4 hours PRN Dyspepsia  artificial  tears Solution 1 Drop(s) Both EYES four times a day PRN eye irritation  glycerin Suppository - Adult 1 Suppository(s) Rectal daily PRN constipation  risperiDONE   Tablet 0.25 milliGRAM(s) Oral every 8 hours PRN Agitation  saline laxative (FLEET) Rectal Enema 1 Enema Rectal daily PRN constipation

## 2021-04-14 NOTE — UM REPORT PROGRESS NOTE - NSUMSWACTION_GEN_A_CORE FT
derrell has been in constant communication with sister and advised of this possibility - with discussions of potential SNF and the PASSAR protocol.   derrell prepared screen, passar packet  and requested AFSHAN - which was prepared on 4/13.  yesterday 4/14 - Dr MCNAMARA called sister to advise of diagnosis and completed her MD note - DERRELL submitted the PASSR packet for review to dc planning office.  derrell remains in  communication with sister who had knee surgery on 4/20.  sw received PASSR today 4/21. requested updated AFSHAN. will make referral to SNF. patient could benefit from a Kosher SNF (as compared to a lifetime of  prepared and packaged kosher meals).  patient and sister agree to snf placement. sw ordered a rolling walker.      Referral made to [facility] - families preferred choice  for LTC.   Patient is Rastafari- very Samaritan and observant. . arranged and participated in 4/26 on site screening by admission director - sam. patient accepted but she then advised they are not in network and would consider for subacute then arranged to change to [facility].    today, 4/27, derrell advocated for subacute rehab, as suggested by Sam,  but after multiple interfaces  with Sam  - she advised  patient does not have enough medicare billable for rehab, as only PT.  They will  accept patient for LTC  with  single caseagreement  -which they would sign.   derrell requested updated AFSHAN.     DERRELL sent updated AFSHAN and documentation to [facility]  Garht with notation for him to forward for review (as per [facility] protocol). Coverletter indicated patient's cultural, Buddhist and dietary needs ('Wyatt Galeana") Referral made to [facility] - families preferred choice  for LTC.   Patient is Mosque- very Lutheran and observant. . arranged and participated in 4/26 on site screening by admission director - sam. patient accepted but she then advised they are not in network and would consider for subacute then arranged to change to [facility].    today, 4/27, derrell advocated for subacute rehab, as suggested by Sam,  but after multiple interfaces  with Sam  - she advised  patient does not have enough medicare billable for rehab, as only PT.  They will  accept patient for LTC  with  single caseagreement  -which they would sign.   derrell requested updated AFSHAN.     DERRELL sent updated AFSHAN and documentation to [facility]  Garth with notation for him to forward for review (as per Integra protocol). Coverletter indicated patient's cultural, Church and dietary needs ('Glarochelle Kosher")    Multiple Follow up outreach calls today to Garth -  patient approved for this snf, integra will call snf.     outreach - phone and allscripts to snf to advise  telephone contact to family to advise    Writer is concerned because Garth stated his supervisor thinks this SNF IS in network. Writer is concerned that insurance is not up to date that SNF is NOT in network (as per SNF) but SNF willing to accept single case agreement (as per fax cover letter) . Writer hopes SNF and insurance can resolve this. Writer hopes there will be a LTC bed.  Support to patient/ interface with family.  advised snf of change of dc timeline. they agreed to find a LTC bed by May 31 (the last day of the authorization) SNF indicated they did not get the auth. sw called insurance and requested they call SNF - providing contact info.   SNF stated they would advise [facility] when insurance in place.. pending.  Support to patient/ interface with family for patient to accept ECT.    sw  advised snf of change of dc timeline. they agreed to find a LTC bed by May 31 (the last day of the authorization) SNF indicated they did not get the auth. sw called insurance and requested they call SNF - providing contact info.   SNF stated they would advise [facility] when insurance in place.  sw followed up - SNF has auth.     SNF knows of plan. patient Can’t have  outpatient  ECT from SNF Support to patient/ interface with family   SOLANGE has repeatedly advised MD that patient  auth  for placement in SNF expires on May 31.  Team advised this might need to be postponed.  Support to patient/ interface with family   SOLANGE has repeatedly advised MD that patient  auth  for placement in SNF expires on May 31 and that  SNF indicated they would have bed if know of date admission date in advance .    Team unable to confirm with social work that patient will be ready for discharge before expiration date of SNF insurance auth of May 31. Team advised  dc might need to be postponed. Support to patient/ interface with family   SOLANGE has repeatedly advised MD that patient  auth  for placement in SNF expires on May 31 and that  SNF indicated they would have bed if know of date admission date in advance .  SOLANGE has  been in contact with accepting SNF that decision to continue or stop ect will be on 5.20 *as per attending.  this afternoon,  5.19 attending advised of readiness for SNF placement and stopping ECT.   SOLANGE communicated this afternoon - 5.19 via allscripts and requested first available bed. Pending their response with admission date.  Support to patient/ interface with family   SOLANGE has repeatedly advised MD that patient  auth  for placement in SNF expires on May 31 and that  SNF indicated they would have bed if know of date admission date in advance .  SOLANGE has  been in contact with accepting SNF that decision to continue or stop ect will be on 5.20 *as per attending.  this afternoon,  5.19 attending advised of readiness for SNF placement and stopping ECT.   SW communicated this afternoon - 5.19 via allscripts and requested first available bed. Pending their response with admission date.   today  SNF responded they will be in touch.   This AM team advised patient MIGHT have ect next week. ( medical issues being addressed- blood stool)

## 2021-04-15 PROCEDURE — 70551 MRI BRAIN STEM W/O DYE: CPT | Mod: 26

## 2021-04-15 PROCEDURE — 99232 SBSQ HOSP IP/OBS MODERATE 35: CPT

## 2021-04-15 RX ADMIN — RISPERIDONE 0.5 MILLIGRAM(S): 4 TABLET ORAL at 20:22

## 2021-04-15 RX ADMIN — MIRTAZAPINE 22.5 MILLIGRAM(S): 45 TABLET, ORALLY DISINTEGRATING ORAL at 20:22

## 2021-04-15 RX ADMIN — Medication 1 MILLIGRAM(S): at 08:44

## 2021-04-15 RX ADMIN — FINASTERIDE 5 MILLIGRAM(S): 5 TABLET, FILM COATED ORAL at 08:44

## 2021-04-15 RX ADMIN — AMLODIPINE BESYLATE 5 MILLIGRAM(S): 2.5 TABLET ORAL at 08:45

## 2021-04-15 RX ADMIN — RISPERIDONE 0.25 MILLIGRAM(S): 4 TABLET ORAL at 17:59

## 2021-04-15 RX ADMIN — Medication 1 MILLIGRAM(S): at 15:26

## 2021-04-15 RX ADMIN — Medication 1000 UNIT(S): at 08:44

## 2021-04-15 RX ADMIN — RISPERIDONE 0.25 MILLIGRAM(S): 4 TABLET ORAL at 08:45

## 2021-04-15 RX ADMIN — MEMANTINE HYDROCHLORIDE 10 MILLIGRAM(S): 10 TABLET ORAL at 08:45

## 2021-04-15 RX ADMIN — MEMANTINE HYDROCHLORIDE 10 MILLIGRAM(S): 10 TABLET ORAL at 20:22

## 2021-04-15 NOTE — BH INPATIENT PSYCHIATRY PROGRESS NOTE - NSBHCHARTREVIEWVS_PSY_A_CORE FT
Vital Signs Last 24 Hrs  T(C): 36.7 (15 Apr 2021 07:35), Max: 36.8 (14 Apr 2021 15:50)  T(F): 98.1 (15 Apr 2021 07:35), Max: 98.3 (14 Apr 2021 15:50)  Orthostatic VS  04-15-21 @ 07:35  Sitting BP: 113/60 HR: 64  Standing BP: 129/80 HR: 69

## 2021-04-15 NOTE — BH INPATIENT PSYCHIATRY PROGRESS NOTE - MSE UNSTRUCTURED FT
The patient appears stated age, dressed in hospital gowns. Cooperative, but gives monosyllabic answers. Focused on eating his breakfast, not using utensils.  No PMA/PMR noted. The patient's speech is fluent but soft with some latency. Reported mood "ok" with constricted affect. Thought process grossly preservative. Thought content: somatically preoccupied on GI symptoms, denies current SI/HI. No perceptual disturbance appreciated. Insight and judgment are poor. Impulse control intact at the time of exam. Oriented to self, place, month, date and year.

## 2021-04-15 NOTE — BH INPATIENT PSYCHIATRY PROGRESS NOTE - NSBHFUPINTERVALHXFT_PSY_A_CORE
Patient seen for depression and behavioral disturbance. Chart reviewed and case discussed with nursing staff. Per staff, no acute events. No PRNs.  Patient seen during and after breakfast this morning. He reports his mood as "ok", remains focused on eating and GI symptoms--witnessed eating fast and finishing his breakfast. Patient denies feeling depressed or anxious. He denies any SI/ HI. No hallucinations reported. Patient remains med compliant.

## 2021-04-15 NOTE — BH INPATIENT PSYCHIATRY PROGRESS NOTE - NSBHMETABOLIC_PSY_ALL_CORE_FT
BMI: BMI (kg/m2): 23.4 (03-19-21 @ 01:15)  HbA1c: A1C with Estimated Average Glucose Result: 5.5 % (03-23-21 @ 09:42)  Lipid Panel: Date/Time: 03-23-21 @ 09:42  Cholesterol, Serum: 172  Direct LDL: --  HDL Cholesterol, Serum: 60  Total Cholesterol/HDL Ration Measurement: --  Triglycerides, Serum: 60

## 2021-04-15 NOTE — BH INPATIENT PSYCHIATRY PROGRESS NOTE - NSBHASSESSSUMMFT_PSY_ALL_CORE
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  Recent neuropsych eval consistent with fronto-temporal dementia (FTD)  On evaluation, patient continues to be fixated on his GI complaints; otherwise no self-injurious behavior or other behavioral concerns. Based and current symptoms, needs and diagnosis of a progressive form of dementia, FTD, pt will benefit from placement to SNF.   Plan: Will c/w Memantine, Mirtazapine and Risperidone at current dosages. Dispo to SNF once stable; plan discussed with sister who is in agreement. MRI later today for confirmation of FTD diagnosis.

## 2021-04-16 PROCEDURE — 99232 SBSQ HOSP IP/OBS MODERATE 35: CPT

## 2021-04-16 RX ADMIN — RISPERIDONE 0.25 MILLIGRAM(S): 4 TABLET ORAL at 17:23

## 2021-04-16 RX ADMIN — MIRTAZAPINE 22.5 MILLIGRAM(S): 45 TABLET, ORALLY DISINTEGRATING ORAL at 20:30

## 2021-04-16 RX ADMIN — FINASTERIDE 5 MILLIGRAM(S): 5 TABLET, FILM COATED ORAL at 08:55

## 2021-04-16 RX ADMIN — Medication 1000 UNIT(S): at 08:55

## 2021-04-16 RX ADMIN — MEMANTINE HYDROCHLORIDE 10 MILLIGRAM(S): 10 TABLET ORAL at 08:55

## 2021-04-16 RX ADMIN — RISPERIDONE 0.5 MILLIGRAM(S): 4 TABLET ORAL at 20:31

## 2021-04-16 RX ADMIN — Medication 1 MILLIGRAM(S): at 08:55

## 2021-04-16 RX ADMIN — Medication 30 MILLILITER(S): at 21:27

## 2021-04-16 RX ADMIN — AMLODIPINE BESYLATE 5 MILLIGRAM(S): 2.5 TABLET ORAL at 08:55

## 2021-04-16 RX ADMIN — MEMANTINE HYDROCHLORIDE 10 MILLIGRAM(S): 10 TABLET ORAL at 20:30

## 2021-04-16 RX ADMIN — RISPERIDONE 0.25 MILLIGRAM(S): 4 TABLET ORAL at 08:55

## 2021-04-16 NOTE — BH INPATIENT PSYCHIATRY PROGRESS NOTE - MSE UNSTRUCTURED FT
The patient appears stated age, dressed in hospital gowns. Cooperative, but gives monosyllabic answers.  No PMA/PMR noted. The patient's speech is fluent but soft. Reported mood "ok" with constricted affect. Thought process grossly preservative. Thought content: somatically preoccupied on GI symptoms, denies current SI/HI. No perceptual disturbance appreciated. Insight and judgment are limited. Impulse control intact at the time of exam. Oriented to self, place, month, date and year.

## 2021-04-16 NOTE — BH INPATIENT PSYCHIATRY PROGRESS NOTE - CURRENT MEDICATION
MEDICATIONS  (STANDING):  amLODIPine   Tablet 5 milliGRAM(s) Oral daily  cholecalciferol 1000 Unit(s) Oral daily  finasteride 5 milliGRAM(s) Oral daily  folic acid 1 milliGRAM(s) Oral daily  memantine 10 milliGRAM(s) Oral two times a day  mirtazapine 22.5 milliGRAM(s) Oral at bedtime  risperiDONE   Tablet 0.25 milliGRAM(s) Oral <User Schedule>  risperiDONE   Tablet 0.5 milliGRAM(s) Oral at bedtime    MEDICATIONS  (PRN):  aluminum hydroxide/magnesium hydroxide/simethicone Suspension 30 milliLiter(s) Oral every 4 hours PRN Dyspepsia  artificial  tears Solution 1 Drop(s) Both EYES four times a day PRN eye irritation  glycerin Suppository - Adult 1 Suppository(s) Rectal daily PRN constipation  LORazepam     Tablet 1 milliGRAM(s) Oral daily PRN Anxiety  risperiDONE   Tablet 0.25 milliGRAM(s) Oral every 8 hours PRN Agitation  saline laxative (FLEET) Rectal Enema 1 Enema Rectal daily PRN constipation

## 2021-04-16 NOTE — BH INPATIENT PSYCHIATRY PROGRESS NOTE - NSBHCHARTREVIEWVS_PSY_A_CORE FT
Vital Signs Last 24 Hrs  T(C): 36.2 (16 Apr 2021 07:48), Max: 36.8 (15 Apr 2021 15:40)  T(F): 97.1 (16 Apr 2021 07:48), Max: 98.3 (15 Apr 2021 15:40)    04-16-21 @ 07:48  Sitting BP: 141/73 HR: 60  Site: upper left arm   Mode: electronic

## 2021-04-16 NOTE — BH INPATIENT PSYCHIATRY PROGRESS NOTE - NSBHASSESSSUMMFT_PSY_ALL_CORE
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  Recent neuropsych eval consistent with fronto-temporal dementia (FTD)  On evaluation, patient denies any concerns; no self-injurious behavior or other behavioral concerns. MRI from yesterday (04/15) shows mild chronic microvascular ischemic disease. Based and current symptoms, needs and diagnosis of a progressive form of dementia, FTD, pt will benefit from placement to SNF.   Plan: Will c/w Memantine, Mirtazapine and Risperidone at current dosages. Dispo to SNF once stable; plan discussed with sister who is in agreement.

## 2021-04-16 NOTE — BH INPATIENT PSYCHIATRY PROGRESS NOTE - NSBHFUPINTERVALHXFT_PSY_A_CORE
Patient seen for depression and behavioral disturbance. Chart reviewed and case discussed with nursing staff. Per staff, no acute events. No PRNs.  Patient seen prior to breakfast this morning, waiting for glue for his dentures. He reports his mood as "ok". When I inquire about his MRI from last evening, he reports it went "ok". Most responses are monosyllabic. Patient denies feeling depressed or anxious. He denies any SI/ HI. No hallucinations reported. Patient remains med compliant.

## 2021-04-16 NOTE — BH INPATIENT PSYCHIATRY PROGRESS NOTE - PRN MEDS
MEDICATIONS  (PRN):  aluminum hydroxide/magnesium hydroxide/simethicone Suspension 30 milliLiter(s) Oral every 4 hours PRN Dyspepsia  artificial  tears Solution 1 Drop(s) Both EYES four times a day PRN eye irritation  glycerin Suppository - Adult 1 Suppository(s) Rectal daily PRN constipation  LORazepam     Tablet 1 milliGRAM(s) Oral daily PRN Anxiety  risperiDONE   Tablet 0.25 milliGRAM(s) Oral every 8 hours PRN Agitation  saline laxative (FLEET) Rectal Enema 1 Enema Rectal daily PRN constipation

## 2021-04-17 RX ORDER — SOD,AMMONIUM,POTASSIUM LACTATE
1 CREAM (GRAM) TOPICAL
Refills: 0 | Status: DISCONTINUED | OUTPATIENT
Start: 2021-04-17 | End: 2021-05-24

## 2021-04-17 RX ADMIN — AMLODIPINE BESYLATE 5 MILLIGRAM(S): 2.5 TABLET ORAL at 09:11

## 2021-04-17 RX ADMIN — Medication 1000 UNIT(S): at 09:11

## 2021-04-17 RX ADMIN — MEMANTINE HYDROCHLORIDE 10 MILLIGRAM(S): 10 TABLET ORAL at 09:11

## 2021-04-17 RX ADMIN — MEMANTINE HYDROCHLORIDE 10 MILLIGRAM(S): 10 TABLET ORAL at 20:21

## 2021-04-17 RX ADMIN — FINASTERIDE 5 MILLIGRAM(S): 5 TABLET, FILM COATED ORAL at 09:11

## 2021-04-17 RX ADMIN — Medication 1 APPLICATION(S): at 20:04

## 2021-04-17 RX ADMIN — RISPERIDONE 0.25 MILLIGRAM(S): 4 TABLET ORAL at 09:11

## 2021-04-17 RX ADMIN — Medication 30 MILLILITER(S): at 09:11

## 2021-04-17 RX ADMIN — RISPERIDONE 0.5 MILLIGRAM(S): 4 TABLET ORAL at 20:21

## 2021-04-17 RX ADMIN — MIRTAZAPINE 22.5 MILLIGRAM(S): 45 TABLET, ORALLY DISINTEGRATING ORAL at 20:21

## 2021-04-17 RX ADMIN — RISPERIDONE 0.25 MILLIGRAM(S): 4 TABLET ORAL at 17:11

## 2021-04-17 RX ADMIN — Medication 1 MILLIGRAM(S): at 09:11

## 2021-04-18 RX ADMIN — Medication 1 APPLICATION(S): at 09:25

## 2021-04-18 RX ADMIN — Medication 1000 UNIT(S): at 09:27

## 2021-04-18 RX ADMIN — Medication 1 MILLIGRAM(S): at 08:22

## 2021-04-18 RX ADMIN — RISPERIDONE 0.5 MILLIGRAM(S): 4 TABLET ORAL at 20:48

## 2021-04-18 RX ADMIN — RISPERIDONE 0.25 MILLIGRAM(S): 4 TABLET ORAL at 18:03

## 2021-04-18 RX ADMIN — Medication 1 APPLICATION(S): at 21:54

## 2021-04-18 RX ADMIN — MEMANTINE HYDROCHLORIDE 10 MILLIGRAM(S): 10 TABLET ORAL at 20:47

## 2021-04-18 RX ADMIN — Medication 30 MILLILITER(S): at 09:14

## 2021-04-18 RX ADMIN — FINASTERIDE 5 MILLIGRAM(S): 5 TABLET, FILM COATED ORAL at 08:22

## 2021-04-18 RX ADMIN — AMLODIPINE BESYLATE 5 MILLIGRAM(S): 2.5 TABLET ORAL at 08:22

## 2021-04-18 RX ADMIN — RISPERIDONE 0.25 MILLIGRAM(S): 4 TABLET ORAL at 08:22

## 2021-04-18 RX ADMIN — MIRTAZAPINE 22.5 MILLIGRAM(S): 45 TABLET, ORALLY DISINTEGRATING ORAL at 20:48

## 2021-04-18 RX ADMIN — MEMANTINE HYDROCHLORIDE 10 MILLIGRAM(S): 10 TABLET ORAL at 08:22

## 2021-04-19 DIAGNOSIS — R26.81 UNSTEADINESS ON FEET: ICD-10-CM

## 2021-04-19 PROCEDURE — 99232 SBSQ HOSP IP/OBS MODERATE 35: CPT

## 2021-04-19 RX ADMIN — RISPERIDONE 0.25 MILLIGRAM(S): 4 TABLET ORAL at 17:33

## 2021-04-19 RX ADMIN — Medication 30 MILLILITER(S): at 09:37

## 2021-04-19 RX ADMIN — Medication 1 MILLIGRAM(S): at 08:29

## 2021-04-19 RX ADMIN — FINASTERIDE 5 MILLIGRAM(S): 5 TABLET, FILM COATED ORAL at 08:28

## 2021-04-19 RX ADMIN — MIRTAZAPINE 22.5 MILLIGRAM(S): 45 TABLET, ORALLY DISINTEGRATING ORAL at 21:54

## 2021-04-19 RX ADMIN — Medication 1 APPLICATION(S): at 08:32

## 2021-04-19 RX ADMIN — MEMANTINE HYDROCHLORIDE 10 MILLIGRAM(S): 10 TABLET ORAL at 08:32

## 2021-04-19 RX ADMIN — Medication 1000 UNIT(S): at 08:28

## 2021-04-19 RX ADMIN — MEMANTINE HYDROCHLORIDE 10 MILLIGRAM(S): 10 TABLET ORAL at 21:53

## 2021-04-19 RX ADMIN — RISPERIDONE 0.5 MILLIGRAM(S): 4 TABLET ORAL at 21:53

## 2021-04-19 RX ADMIN — AMLODIPINE BESYLATE 5 MILLIGRAM(S): 2.5 TABLET ORAL at 08:27

## 2021-04-19 RX ADMIN — RISPERIDONE 0.25 MILLIGRAM(S): 4 TABLET ORAL at 08:28

## 2021-04-19 NOTE — BH INPATIENT PSYCHIATRY PROGRESS NOTE - NSBHCHARTREVIEWVS_PSY_A_CORE FT
Vital Signs Last 24 Hrs  T(C): 36.4 (19 Apr 2021 05:52), Max: 37.1 (18 Apr 2021 15:32)  T(F): 97.5 (19 Apr 2021 05:52), Max: 98.8 (18 Apr 2021 15:32)  RR: 18 (19 Apr 2021 05:52) (18 - 18)  SpO2: 99% (19 Apr 2021 05:52) (99% - 99%)  Orthostatic VS  04-19-21 @ 05:52  Lying BP: 126/57 HR: 66   Sitting BP: 119/56 HR: 60  Site: upper left arm   Mode: electronic

## 2021-04-19 NOTE — BH INPATIENT PSYCHIATRY PROGRESS NOTE - NSBHMETABOLIC_PSY_ALL_CORE_FT
BMI: BMI (kg/m2): 22.7 (04-17-21 @ 16:07)  HbA1c: A1C with Estimated Average Glucose Result: 5.5 % (03-23-21 @ 09:42)  Lipid Panel: Date/Time: 03-23-21 @ 09:42  Cholesterol, Serum: 172  HDL Cholesterol, Serum: 60  Triglycerides, Serum: 60

## 2021-04-19 NOTE — BH INPATIENT PSYCHIATRY PROGRESS NOTE - NSBHASSESSSUMMFT_PSY_ALL_CORE
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  Recent neuropsych eval consistent with fronto-temporal dementia (FTD)  On evaluation, patient denies any concerns; no self-injurious behavior or other behavioral concerns. Patient remains perseverative about his food and how it makes him feel.  Plan: Will c/w Memantine, Mirtazapine and Risperidone at current dosages. 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 in 1989 and most recent 07/10/19 followed by [facility] rickey clinic but withdrew after the initial intake; h/o of SI as per last admission, no substance abuse hx., BIB EMS for aggression towards HHA and self-injurious behavior.  Recent neuropsych eval consistent with fronto-temporal dementia (FTD)  On evaluation, patient denies any concerns; no self-injurious behavior or other behavioral concerns. Patient remains perseverative about his food and how it makes him feel. Of note, patient needs a walker to perform his ADLs. Patient’s gait is only stable with a rolling walker and a cane is not sufficient.    Plan: Will c/w Memantine, Mirtazapine and Risperidone at current dosages. Dispo to SNF once stable

## 2021-04-19 NOTE — BH INPATIENT PSYCHIATRY PROGRESS NOTE - CURRENT MEDICATION
MEDICATIONS  (STANDING):  amLODIPine Tablet 5 milliGRAM(s) Oral daily  ammonium lactate 12% Lotion 1 Application(s) Topical two times a day  cholecalciferol 1000 Unit(s) Oral daily  finasteride 5 milliGRAM(s) Oral daily  folic acid 1 milliGRAM(s) Oral daily  memantine 10 milliGRAM(s) Oral two times a day  mirtazapine 22.5 milliGRAM(s) Oral at bedtime  risperiDONE Tablet 0.25 milliGRAM(s) Oral <User Schedule>  risperiDONE Tablet 0.5 milliGRAM(s) Oral at bedtime    MEDICATIONS  (PRN):  aluminum hydroxide/magnesium hydroxide/simethicone Suspension 30 milliLiter(s) Oral every 4 hours PRN Dyspepsia  artificial  tears Solution 1 Drop(s) Both EYES four times a day PRN eye irritation  glycerin Suppository - Adult 1 Suppository(s) Rectal daily PRN constipation  LORazepam Tablet 1 milliGRAM(s) Oral daily PRN Anxiety  risperiDONE Tablet 0.25 milliGRAM(s) Oral every 8 hours PRN Agitation  saline laxative (FLEET) Rectal Enema 1 Enema Rectal daily PRN constipation

## 2021-04-19 NOTE — BH INPATIENT PSYCHIATRY PROGRESS NOTE - NSBHFUPINTERVALHXFT_PSY_A_CORE
Patient seen for depression and behavioral disturbance. Chart reviewed and case discussed with nursing staff. Per staff, no acute events. No PRNs.  On encounter today, patient reports his mood as "ok", reports he ate too much, does not talk about anything else. Acknowledges that his sister visited him this weekend. Patient denies any SI/ HI. No hallucinations reported. Patient remains med compliant.

## 2021-04-20 PROCEDURE — 99232 SBSQ HOSP IP/OBS MODERATE 35: CPT

## 2021-04-20 RX ORDER — PANTOPRAZOLE SODIUM 20 MG/1
40 TABLET, DELAYED RELEASE ORAL
Refills: 0 | Status: DISCONTINUED | OUTPATIENT
Start: 2021-04-20 | End: 2021-05-24

## 2021-04-20 RX ADMIN — RISPERIDONE 0.5 MILLIGRAM(S): 4 TABLET ORAL at 20:31

## 2021-04-20 RX ADMIN — RISPERIDONE 0.25 MILLIGRAM(S): 4 TABLET ORAL at 17:11

## 2021-04-20 RX ADMIN — MIRTAZAPINE 22.5 MILLIGRAM(S): 45 TABLET, ORALLY DISINTEGRATING ORAL at 20:31

## 2021-04-20 RX ADMIN — RISPERIDONE 0.25 MILLIGRAM(S): 4 TABLET ORAL at 09:06

## 2021-04-20 RX ADMIN — FINASTERIDE 5 MILLIGRAM(S): 5 TABLET, FILM COATED ORAL at 08:46

## 2021-04-20 RX ADMIN — AMLODIPINE BESYLATE 5 MILLIGRAM(S): 2.5 TABLET ORAL at 08:46

## 2021-04-20 RX ADMIN — MEMANTINE HYDROCHLORIDE 10 MILLIGRAM(S): 10 TABLET ORAL at 20:33

## 2021-04-20 RX ADMIN — Medication 1 APPLICATION(S): at 10:13

## 2021-04-20 RX ADMIN — Medication 1000 UNIT(S): at 08:45

## 2021-04-20 RX ADMIN — Medication 1 MILLIGRAM(S): at 08:45

## 2021-04-20 RX ADMIN — MEMANTINE HYDROCHLORIDE 10 MILLIGRAM(S): 10 TABLET ORAL at 08:47

## 2021-04-20 NOTE — BH INPATIENT PSYCHIATRY PROGRESS NOTE - CURRENT MEDICATION
MEDICATIONS  (STANDING):  amLODIPine   Tablet 5 milliGRAM(s) Oral daily  ammonium lactate 12% Lotion 1 Application(s) Topical two times a day  cholecalciferol 1000 Unit(s) Oral daily  finasteride 5 milliGRAM(s) Oral daily  folic acid 1 milliGRAM(s) Oral daily  memantine 10 milliGRAM(s) Oral two times a day  mirtazapine 22.5 milliGRAM(s) Oral at bedtime  pantoprazole    Tablet 40 milliGRAM(s) Oral before breakfast  risperiDONE   Tablet 0.5 milliGRAM(s) Oral at bedtime  risperiDONE   Tablet 0.25 milliGRAM(s) Oral <User Schedule>    MEDICATIONS  (PRN):  aluminum hydroxide/magnesium hydroxide/simethicone Suspension 30 milliLiter(s) Oral every 4 hours PRN Dyspepsia  artificial  tears Solution 1 Drop(s) Both EYES four times a day PRN eye irritation  glycerin Suppository - Adult 1 Suppository(s) Rectal daily PRN constipation  LORazepam     Tablet 1 milliGRAM(s) Oral daily PRN Anxiety  risperiDONE   Tablet 0.25 milliGRAM(s) Oral every 8 hours PRN Agitation  saline laxative (FLEET) Rectal Enema 1 Enema Rectal daily PRN constipation

## 2021-04-20 NOTE — BH INPATIENT PSYCHIATRY PROGRESS NOTE - NSBHASSESSSUMMFT_PSY_ALL_CORE
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  Recent neuropsych eval consistent with fronto-temporal dementia (FTD)  On evaluation, patient remains perseverative about his food, bowel and other GI symptoms. Patient’s gait is only stable with a rolling walker.  Plan: Will c/w Memantine, Mirtazapine (will consider lowering Mirtazapine if patient remains focused on food) and Risperidone at current dosages. Dispo to SNF once stable

## 2021-04-20 NOTE — BH INPATIENT PSYCHIATRY PROGRESS NOTE - NSBHCHARTREVIEWVS_PSY_A_CORE FT
Vital Signs Last 24 Hrs  T(C): 37.1 (20 Apr 2021 15:46), Max: 37.1 (20 Apr 2021 15:46)  T(F): 98.8 (20 Apr 2021 15:46), Max: 98.8 (20 Apr 2021 15:46)  Orthostatic VS  04-20-21 @ 05:56  Lying BP: 151/73 HR: 57   Sitting BP: 137/76 HR: 60  Site: upper left arm   Mode: electronic

## 2021-04-20 NOTE — BH INPATIENT PSYCHIATRY PROGRESS NOTE - NSBHFUPINTERVALHXFT_PSY_A_CORE
Patient seen for depression and behavioral disturbance. Chart reviewed and case discussed with nursing staff. Per staff, pt focused on food and bowels; he had a bowel movement in the open this morning. On encounter today, patient reports that his stomach is bothering him, that he feels full from last night and does not want to eat this morning. He remains focused on food and GI symptoms and does not talk about anything else. He acknowledges that he spoke with his sister over the phone. Patient denies any SI/ HI. No hallucinations reported. He remains med compliant.

## 2021-04-20 NOTE — BH INPATIENT PSYCHIATRY PROGRESS NOTE - NSBHMETABOLIC_PSY_ALL_CORE_FT
BMI: BMI (kg/m2): 22.7 (04-17-21 @ 16:07)  HbA1c: A1C with Estimated Average Glucose Result: 5.5 % (03-23-21 @ 09:42)  Lipid Panel: Date/Time: 03-23-21 @ 09:42  Cholesterol, Serum: 172  Direct LDL: --  HDL Cholesterol, Serum: 60  Total Cholesterol/HDL Ration Measurement: --  Triglycerides, Serum: 60

## 2021-04-20 NOTE — BH INPATIENT PSYCHIATRY PROGRESS NOTE - MSE UNSTRUCTURED FT
The patient appears stated age, dressed in hospital gowns. Cooperative, but gives limited answers to most questions. No PMA/PMR noted. The patient's speech is fluent but soft. Reported mood "not good" with constricted affect. Thought process grossly preservative. Thought content: somatically preoccupied on GI symptoms, denies current SI/HI. No perceptual disturbance appreciated. Insight and judgment are limited. Impulse control intact at the time of exam. Oriented to self, place, month, date and year.

## 2021-04-21 PROCEDURE — 99232 SBSQ HOSP IP/OBS MODERATE 35: CPT

## 2021-04-21 RX ADMIN — FINASTERIDE 5 MILLIGRAM(S): 5 TABLET, FILM COATED ORAL at 08:37

## 2021-04-21 RX ADMIN — RISPERIDONE 0.25 MILLIGRAM(S): 4 TABLET ORAL at 17:37

## 2021-04-21 RX ADMIN — RISPERIDONE 0.25 MILLIGRAM(S): 4 TABLET ORAL at 08:37

## 2021-04-21 RX ADMIN — MIRTAZAPINE 22.5 MILLIGRAM(S): 45 TABLET, ORALLY DISINTEGRATING ORAL at 20:21

## 2021-04-21 RX ADMIN — RISPERIDONE 0.5 MILLIGRAM(S): 4 TABLET ORAL at 20:21

## 2021-04-21 RX ADMIN — Medication 1000 UNIT(S): at 08:37

## 2021-04-21 RX ADMIN — PANTOPRAZOLE SODIUM 40 MILLIGRAM(S): 20 TABLET, DELAYED RELEASE ORAL at 08:36

## 2021-04-21 RX ADMIN — Medication 1 APPLICATION(S): at 08:36

## 2021-04-21 RX ADMIN — AMLODIPINE BESYLATE 5 MILLIGRAM(S): 2.5 TABLET ORAL at 08:37

## 2021-04-21 RX ADMIN — MEMANTINE HYDROCHLORIDE 10 MILLIGRAM(S): 10 TABLET ORAL at 20:20

## 2021-04-21 RX ADMIN — MEMANTINE HYDROCHLORIDE 10 MILLIGRAM(S): 10 TABLET ORAL at 08:37

## 2021-04-21 RX ADMIN — Medication 1 APPLICATION(S): at 20:21

## 2021-04-21 RX ADMIN — Medication 1 MILLIGRAM(S): at 08:36

## 2021-04-21 NOTE — BH INPATIENT PSYCHIATRY PROGRESS NOTE - MSE UNSTRUCTURED FT
The patient appears stated age, dressed in hospital gowns. Cooperative, but gives limited answers to most questions. No PMA/PMR noted. The patient's speech is fluent but soft. Reported mood "ok" with constricted affect. Thought process grossly preservative. Thought content: somatically preoccupied with GI symptoms, denies current SI/HI. No perceptual disturbance appreciated. Insight and judgment are limited. Impulse control intact at the time of exam. Oriented to self, place, month, date and year.

## 2021-04-21 NOTE — BH INPATIENT PSYCHIATRY DISCHARGE NOTE - NSICDXPASTMEDICALHX_GEN_ALL_CORE_FT
PAST MEDICAL HISTORY:  GERD (gastroesophageal reflux disease)     HTN (hypertension)     Hypercholesteremia     Vitamin D deficiency

## 2021-04-21 NOTE — BH INPATIENT PSYCHIATRY DISCHARGE NOTE - NSBHDCMEDICALFT_PSY_A_CORE
# Blood in stool: Hemodynamically stable. Blood streaked stools may be due to hemorrhoids.  Pt on PPI.  If Hb stable patient may follow up as outpatient with GI.    # Essential HTN  -continue current meds    # HLD  - c/w DASH/TLC diet    # VitD deficiency  - recent vit D level 39.4, improved  - continue to monitor, on vit D supplementation    # GERD:  c/w ppi

## 2021-04-21 NOTE — BH INPATIENT PSYCHIATRY DISCHARGE NOTE - HOSPITAL COURSE
Patient initially presented as disorganized, reported that he was tired and 'sad', reported that he hurt his hands but is unable to explain why he did it. He denied hearing voices. He also denied any thoughts of hurting self or others.   Collateral information obtained from his sister Adelina who reported that patient had been increasingly unable to take care of self and was placed at an long-term, but after COVID, he came back home and currently had HHAs. He had however started to threaten them, make suicidal statements and punching and hitting his hands against the walls and other surfaces. Patient was admitted on the inpatient unit and was found to have depression along with agitation. He was unable to explain why he was hitting himself, but stated that he had been feeling "sad". He reported amotivation, helplessness, hopelessness. He however denied death wish/ suicidal ideation/ intent/ plan. Per collateral from sister, at baseline, patient likes interacting with other people, but has been isolative and unable to care for self in the last few months, and had even started threatening his HHA with violence. Risperidone was initiated to help with agitation and Mirtazapine to address mood symptoms. Patient's agitation improved, however he was observed to be isolative, never smiling, not participating in group activities. He was excessively preoccupied with his stomach (has a h/o hiatal hernia) and bowels. He made frequent burping sounds--attributing them to him "eating too fast or drinking too fast". He believed he is going to die. He spent most of his day perseverating/preoccupied about his stomach. Patient also had a neuropsych eval that was consistent with fronto-temporal dementia (FTD). CT and MRI showed microvascular changes, but essentially unremarkable. Since patient had not shown sufficient response in somatic symptoms despite being in the hospital for 1.5 months, ECT team was consulted and he was evaluated and approved for ECT. Patient received 7 rounds of ECT, the last being on 05/24/21 and though he has shown some improvement in perseverative behavior and stopped burping and retching, the improvement is not significant enough to justify continuing ECT going forward. As patient needs assistance with all ADLs except feeling himself, it was determined that he would benefit from placement to a nursing home rather than returning home to live on his own. Additionally, patient's presenting symptoms of agitation and self-mutilation have improved significantly on current management and patient has maximized his treatment in an inpatient setting, and is psychiatrically stable to continue his treatment in an outpatient setting. Patient's case was discussed with all members of interdisciplinary team and when everyone agreed that patient was stable and appropriate for discharge, he was discharged to a SNF. Prior to discharge, patient was informed of his medications and the importance of treatment compliance was reiterated. Patient verbalized understanding of the matter and agreed with his discharge plan. Patient also denied any thought of self-harm at the time of discharge.     Of note, On 05/18/2021 patient had large BMs and blood-streaked stool was noted. Patient denied abd pain or knowledge of any blood. He was evaluated by the hospitalist, blood work was done and he was determined to be hemodynamically stable. His Hb also remained stable at 10.5 ( was 10.4 on 05/19) and it is recommended that patient follow-up with a GI specialist as an outpatient.

## 2021-04-21 NOTE — BH INPATIENT PSYCHIATRY DISCHARGE NOTE - NSBHMETABOLIC_PSY_ALL_CORE_FT
BMI: BMI (kg/m2): 22.7 (04-17-21 @ 16:07)  HbA1c: A1C with Estimated Average Glucose Result: 5.5 % (03-23-21 @ 09:42)    Glucose:   BP: --  Lipid Panel: Date/Time: 03-23-21 @ 09:42  Cholesterol, Serum: 172  Direct LDL: --  HDL Cholesterol, Serum: 60  Total Cholesterol/HDL Ration Measurement: --  Triglycerides, Serum: 60   BMI: BMI (kg/m2): 22.7 (04-17-21 @ 16:07)  HbA1c: A1C with Estimated Average Glucose Result: 5.5 % (03-23-21 @ 09:42)  Lipid Panel: Date/Time: 03-23-21 @ 09:42  Cholesterol, Serum: 172  HDL Cholesterol, Serum: 60  Triglycerides, Serum: 60

## 2021-04-21 NOTE — BH INPATIENT PSYCHIATRY DISCHARGE NOTE - DESCRIPTION
single, no children, retired  Single, no children, retired , lives alone, has HHA during the day 5X per week.

## 2021-04-21 NOTE — BH INPATIENT PSYCHIATRY PROGRESS NOTE - NSBHCHARTREVIEWVS_PSY_A_CORE FT
Vital Signs Last 24 Hrs  T(C): 36.8 (21 Apr 2021 08:09), Max: 37.1 (20 Apr 2021 15:46)  T(F): 98.2 (21 Apr 2021 08:09), Max: 98.8 (20 Apr 2021 15:46)  Orthostatic VS  04-21-21 @ 08:09  Sitting BP: 139/82 HR: 76  Standing BP: 125/71 HR: 62

## 2021-04-21 NOTE — BH INPATIENT PSYCHIATRY DISCHARGE NOTE - NSDCCPCAREPLAN_GEN_ALL_CORE_FT
PRINCIPAL DISCHARGE DIAGNOSIS  Diagnosis: Major depressive disorder, single episode with psychotic features  Assessment and Plan of Treatment: Continue current medications and follow-up with your outpatient provider.

## 2021-04-21 NOTE — BH INPATIENT PSYCHIATRY DISCHARGE NOTE - NSDCMRMEDTOKEN_GEN_ALL_CORE_FT
finasteride 5 mg oral tablet: 1 tab(s) orally once a day  mirtazapine 45 mg oral tablet: 1 tab(s) orally once a day (at bedtime)  traZODone 50 mg oral tablet: 1 tab(s) orally once a day (at bedtime)   amLODIPine 5 mg oral tablet: 1 tab(s) orally once a day  cholecalciferol oral tablet: 1000 unit(s) orally once a day  finasteride 5 mg oral tablet: 1 tab(s) orally once a day  folic acid 1 mg oral tablet: 1 tab(s) orally once a day  memantine 10 mg oral tablet: 1 tab(s) orally 2 times a day  mirtazapine 30 mg oral tablet: 1 tab(s) orally once a day (at bedtime)  pantoprazole 40 mg oral delayed release tablet: 1 tab(s) orally once a day (before a meal)  risperiDONE 0.25 mg oral tablet: 1 tab(s) orally every 8 hours, As needed, Agitation  risperiDONE 0.25 mg oral tablet: 1 tab(s) orally   risperiDONE 0.5 mg oral tablet: 1 tab(s) orally once a day (at bedtime)

## 2021-04-21 NOTE — BH INPATIENT PSYCHIATRY PROGRESS NOTE - NSBHASSESSSUMMFT_PSY_ALL_CORE
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  Recent neuropsych eval consistent with fronto-temporal dementia (FTD)  On evaluation, patient remains perseverative about his food, bowel and other GI symptoms. No new concerns reported. Appetite and sleep remain adequate.  Plan: Will c/w Memantine, Mirtazapine, and Risperidone at current dosages. Dispo to SNF once stable

## 2021-04-21 NOTE — BH INPATIENT PSYCHIATRY PROGRESS NOTE - CURRENT MEDICATION
MEDICATIONS  (STANDING):  amLODIPine Tablet 5 milliGRAM(s) Oral daily  ammonium lactate 12% Lotion 1 Application(s) Topical two times a day  cholecalciferol 1000 Unit(s) Oral daily  finasteride 5 milliGRAM(s) Oral daily  folic acid 1 milliGRAM(s) Oral daily  memantine 10 milliGRAM(s) Oral two times a day  mirtazapine 22.5 milliGRAM(s) Oral at bedtime  pantoprazole Tablet 40 milliGRAM(s) Oral before breakfast  risperiDONE Tablet 0.25 milliGRAM(s) Oral <User Schedule>  risperiDONE Tablet 0.5 milliGRAM(s) Oral at bedtime    MEDICATIONS  (PRN):  aluminum hydroxide/magnesium hydroxide/simethicone Suspension 30 milliLiter(s) Oral every 4 hours PRN Dyspepsia  artificial tears Solution 1 Drop(s) Both EYES four times a day PRN eye irritation  glycerin Suppository - Adult 1 Suppository(s) Rectal daily PRN constipation  LORazepam Tablet 1 milliGRAM(s) Oral daily PRN Anxiety  risperiDONE Tablet 0.25 milliGRAM(s) Oral every 8 hours PRN Agitation  saline laxative (FLEET) Rectal Enema 1 Enema Rectal daily PRN constipation

## 2021-04-21 NOTE — BH INPATIENT PSYCHIATRY PROGRESS NOTE - NSBHFUPINTERVALHXFT_PSY_A_CORE
Patient seen for depression and behavioral disturbance. Chart reviewed and case discussed with nursing staff. Per staff, pt focused on food and bowels. On encounter today, patient reports that his stomach is bothering him, that he should not have had the orange juice. He remains focused on food and GI symptoms and does not talk about anything else; even when subject is changed patient comes back to his food/ GI symptoms. Patient denies any SI/ HI. No hallucinations reported. He remains med compliant.

## 2021-04-21 NOTE — BH INPATIENT PSYCHIATRY DISCHARGE NOTE - NSBHDCSIGEVENTSFT_PSY_A_CORE
Patient occasionally urinated and defecated in the trash can in his room, but the behavior has improved over the last couple of weeks.

## 2021-04-21 NOTE — BH INPATIENT PSYCHIATRY DISCHARGE NOTE - HPI (INCLUDE ILLNESS QUALITY, SEVERITY, DURATION, TIMING, CONTEXT, MODIFYING FACTORS, ASSOCIATED SIGNS AND SYMPTOMS)
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     Per ED assessment: "Patient on assessment is AAOX3 , calm, cooperative but perseverative . Patient is not able to provide a reasonable explanation as to why his both hands are bruised and his forehead , but admits to banging both of his hands and his head against the wall. He keeps repeating "it was stupid of me , I never had this problem before". When trying to explore what made him do it, whether he was angry at himself or at others , or if he did it because he wanted to hurt himself , denied any of the above reasons mentioned. He denies he has si/i/p, stating he did 2 years ago when he was hospitalized. No hi/i/p, although the sister reports otherwise that he has been threatening towards the HHA. He perseverates about not having these issues before , and that he used to go to the gym , swimming , and now he is not doing these things anymore but not able to explain why. He admits to feeling lonely and depressed, and that his sleep is not good, and that he has been loosing weight although denies poor appetite. He denies any manic or psychotic sx . Pt understands that he has not been functioning well and is agreeable to be admitted psychiatrically."    On encounter today, patient is disorganized, reports he is tired and 'sad'; tells me he is here because he hurt his hands and shows me the dorsal surface of both hands. Unable to explain why he hurt self. Denies hearing voices. Denies current SI/HI.  Contact made with sister Adelina who reports that patient has been increasingly unable to take care of self and was placed at an shelter, but after COVID, he came back home and currently has HHAs, but has started to threaten them as well as make suicidal statements. Per sister, patient scheduled to have an outpatient neuropsych eval with Dr. Tineo on 04/06.   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     Per initial ED assessment: "Patient on assessment is AAOX3 , calm, cooperative but perseverative. Patient is not able to provide a reasonable explanation as to why his both hands are bruised and his forehead, but admits to banging both of his hands and his head against the wall. He keeps repeating "it was stupid of me , I never had this problem before". When trying to explore what made him do it, whether he was angry at himself or at others, or if he did it because he wanted to hurt himself, denied any of the above reasons mentioned. He denies he has si/i/p, stating he did 2 years ago when he was hospitalized. No hi/i/p, although the sister reports otherwise that he has been threatening towards the [facility]. He perseverates about not having these issues before, and that he used to go to the gym, swimming, and now he is not doing these things anymore but not able to explain why. He admits to feeling lonely and depressed, and that his sleep is not good, and that he has been losing weight although denies poor appetite. He denies any manic or psychotic sx. 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     Per initial ED assessment: "Patient on assessment is AAOX3, calm, cooperative but perseverative. Patient is not able to provide a reasonable explanation as to why his both hands are bruised and his forehead, but admits to banging both of his hands and his head against the wall. He keeps repeating "it was stupid of me, I never had this problem before". When trying to explore what made him do it, whether he was angry at himself or at others, or if he did it because he wanted to hurt himself, denied any of the above reasons mentioned. He denies he has si/i/p, stating he did 2 years ago when he was hospitalized. No hi/i/p, although the sister reports otherwise that he has been threatening towards the A. He perseverates about not having these issues before, and that he used to go to the gym, swimming, and now he is not doing these things anymore but not able to explain why. He admits to feeling lonely and depressed, and that his sleep is not good, and that he has been losing weight although denies poor appetite. He denies any manic or psychotic sx. Pt understands that he has not been functioning well and is agreeable to be admitted psychiatrically."

## 2021-04-21 NOTE — BH INPATIENT PSYCHIATRY DISCHARGE NOTE - NSDCPROCEDURESFT_PSY_ALL_CORE
CBC Full  -  ( 20 May 2021 09:59 )  WBC Count : 7.59 K/uL  RBC Count : 3.63 M/uL  Hemoglobin : 10.5 g/dL  Hematocrit : 34.1 %  Platelet Count - Automated : 174 K/uL  Mean Cell Volume : 93.9 fL  Mean Cell Hemoglobin : 28.9 pg  Mean Cell Hemoglobin Concentration : 30.8 gm/dL

## 2021-04-21 NOTE — BH INPATIENT PSYCHIATRY DISCHARGE NOTE - REASON FOR ADMISSION
70-year-old single Taoist male BIB EMS for aggression towards others and self-injurious behavior.

## 2021-04-21 NOTE — BH INPATIENT PSYCHIATRY DISCHARGE NOTE - OTHER PAST PSYCHIATRIC HISTORY (INCLUDE DETAILS REGARDING ONSET, COURSE OF ILLNESS, INPATIENT/OUTPATIENT TREATMENT)
hx of depression   [facility] hosp in 1989 AND  2019  no hx of SA History of depression and SI, no h/o SA  [facility] hosp in 1989 and 2019

## 2021-04-22 PROCEDURE — 99232 SBSQ HOSP IP/OBS MODERATE 35: CPT

## 2021-04-22 RX ADMIN — MIRTAZAPINE 22.5 MILLIGRAM(S): 45 TABLET, ORALLY DISINTEGRATING ORAL at 20:22

## 2021-04-22 RX ADMIN — Medication 30 MILLILITER(S): at 03:11

## 2021-04-22 RX ADMIN — PANTOPRAZOLE SODIUM 40 MILLIGRAM(S): 20 TABLET, DELAYED RELEASE ORAL at 08:38

## 2021-04-22 RX ADMIN — Medication 1 APPLICATION(S): at 20:22

## 2021-04-22 RX ADMIN — AMLODIPINE BESYLATE 5 MILLIGRAM(S): 2.5 TABLET ORAL at 08:39

## 2021-04-22 RX ADMIN — RISPERIDONE 0.25 MILLIGRAM(S): 4 TABLET ORAL at 08:38

## 2021-04-22 RX ADMIN — Medication 1000 UNIT(S): at 08:42

## 2021-04-22 RX ADMIN — MEMANTINE HYDROCHLORIDE 10 MILLIGRAM(S): 10 TABLET ORAL at 20:22

## 2021-04-22 RX ADMIN — Medication 1 MILLIGRAM(S): at 08:42

## 2021-04-22 RX ADMIN — MEMANTINE HYDROCHLORIDE 10 MILLIGRAM(S): 10 TABLET ORAL at 08:38

## 2021-04-22 RX ADMIN — FINASTERIDE 5 MILLIGRAM(S): 5 TABLET, FILM COATED ORAL at 08:42

## 2021-04-22 RX ADMIN — RISPERIDONE 0.25 MILLIGRAM(S): 4 TABLET ORAL at 17:21

## 2021-04-22 RX ADMIN — RISPERIDONE 0.5 MILLIGRAM(S): 4 TABLET ORAL at 20:22

## 2021-04-22 NOTE — BH INPATIENT PSYCHIATRY PROGRESS NOTE - NSBHFUPINTERVALHXFT_PSY_A_CORE
Patient seen for depression and behavioral disturbance. Chart reviewed and case discussed with nursing staff. Per staff, pt remains focused on food and bowels. On encounter today, patient reports that he can't eat anymore, he says this as he is eating breakfast however. Patient once again encouraged to eat slowly and eat smaller portions. Patient denies any SI/ HI. No hallucinations reported. He remains med compliant.

## 2021-04-22 NOTE — BH INPATIENT PSYCHIATRY PROGRESS NOTE - MSE UNSTRUCTURED FT
The patient appears stated age, dressed in hospital gowns. Cooperative, but gives limited answers to most questions. No PMA/PMR noted. The patient's speech is fluent but soft. Reported mood "I can't eat" with constricted and somewhat anxious affect. Thought process grossly preservative. Thought content: somatically preoccupied with GI symptoms, denies current SI/HI. No perceptual disturbance appreciated. Insight and judgment are limited. Impulse control intact at the time of exam. Oriented to self, place, month, date and year.

## 2021-04-22 NOTE — BH INPATIENT PSYCHIATRY PROGRESS NOTE - NSBHCHARTREVIEWVS_PSY_A_CORE FT
Vital Signs Last 24 Hrs  T(C): 36.9 (21 Apr 2021 15:52), Max: 36.9 (21 Apr 2021 15:52)  T(F): 98.5 (21 Apr 2021 15:52), Max: 98.5 (21 Apr 2021 15:52)  Orthostatic VS  04-21-21 @ 08:09  Sitting BP: 139/82 HR: 76  Standing BP: 125/71 HR: 62

## 2021-04-22 NOTE — BH INPATIENT PSYCHIATRY PROGRESS NOTE - NSBHASSESSSUMMFT_PSY_ALL_CORE
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  Recent neuropsych eval consistent with fronto-temporal dementia (FTD)  On evaluation, patient remains perseverative about his food, bowel and other GI symptoms. No new concerns reported. Appetite and sleep remain adequate.  Plan: Will c/w Memantine, Mirtazapine, and Risperidone at current dosages. 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  Recent neuropsych eval consistent with fronto-temporal dementia (FTD)  On evaluation, patient remains perseverative about his food, bowel and other GI symptoms. Appetite and sleep remain adequate. No self-injurious behavior observed for a while now.  Plan: Will c/w Memantine, Mirtazapine, and Risperidone at current dosages. Dispo to SNF when accepted.

## 2021-04-23 PROCEDURE — 99232 SBSQ HOSP IP/OBS MODERATE 35: CPT

## 2021-04-23 RX ADMIN — RISPERIDONE 0.5 MILLIGRAM(S): 4 TABLET ORAL at 20:28

## 2021-04-23 RX ADMIN — RISPERIDONE 0.25 MILLIGRAM(S): 4 TABLET ORAL at 18:18

## 2021-04-23 RX ADMIN — AMLODIPINE BESYLATE 5 MILLIGRAM(S): 2.5 TABLET ORAL at 08:55

## 2021-04-23 RX ADMIN — Medication 1 MILLIGRAM(S): at 08:57

## 2021-04-23 RX ADMIN — Medication 1 APPLICATION(S): at 20:29

## 2021-04-23 RX ADMIN — PANTOPRAZOLE SODIUM 40 MILLIGRAM(S): 20 TABLET, DELAYED RELEASE ORAL at 08:56

## 2021-04-23 RX ADMIN — MEMANTINE HYDROCHLORIDE 10 MILLIGRAM(S): 10 TABLET ORAL at 08:56

## 2021-04-23 RX ADMIN — MEMANTINE HYDROCHLORIDE 10 MILLIGRAM(S): 10 TABLET ORAL at 20:28

## 2021-04-23 RX ADMIN — RISPERIDONE 0.25 MILLIGRAM(S): 4 TABLET ORAL at 08:59

## 2021-04-23 RX ADMIN — MIRTAZAPINE 22.5 MILLIGRAM(S): 45 TABLET, ORALLY DISINTEGRATING ORAL at 20:28

## 2021-04-23 RX ADMIN — FINASTERIDE 5 MILLIGRAM(S): 5 TABLET, FILM COATED ORAL at 08:56

## 2021-04-23 RX ADMIN — Medication 1000 UNIT(S): at 08:56

## 2021-04-23 NOTE — BH INPATIENT PSYCHIATRY PROGRESS NOTE - NSBHASSESSSUMMFT_PSY_ALL_CORE
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  Recent neuropsych eval consistent with fronto-temporal dementia (FTD)  On evaluation, patient remains perseverative about his food/ GI symptoms. Appetite and sleep remain adequate. No self-injurious behavior observed for a while now. Patient remains med compliant.   Plan: Will c/w Memantine, Mirtazapine, and Risperidone at current dosages. Dispo to SNF when accepted.

## 2021-04-23 NOTE — BH INPATIENT PSYCHIATRY PROGRESS NOTE - NSBHCHARTREVIEWVS_PSY_A_CORE FT
Vital Signs Last 24 Hrs  T(C): 36.4 (23 Apr 2021 08:24), Max: 36.4 (23 Apr 2021 08:24)  T(F): 97.5 (23 Apr 2021 08:24), Max: 97.5 (23 Apr 2021 08:24)  Orthostatic VS  04-23-21 @ 08:24  Lying BP: 162/70 HR: 60   Sitting BP: 152/70 HR: 62

## 2021-04-23 NOTE — BH INPATIENT PSYCHIATRY PROGRESS NOTE - NSBHFUPINTERVALHXFT_PSY_A_CORE
Patient seen for depression and behavioral disturbance. Chart reviewed and case discussed with nursing staff. Per staff, pt remains focused on food and bowels. On encounter today, patient reports that he feels uncomfortable after eating his breakfast this morning. Patient once again encouraged to eat slowly and eat smaller portions. Patient denies any SI/ HI. No hallucinations reported. He remains med compliant.

## 2021-04-23 NOTE — BH INPATIENT PSYCHIATRY PROGRESS NOTE - CURRENT MEDICATION
MEDICATIONS  (STANDING):  amLODIPine   Tablet 5 milliGRAM(s) Oral daily  ammonium lactate 12% Lotion 1 Application(s) Topical two times a day  cholecalciferol 1000 Unit(s) Oral daily  finasteride 5 milliGRAM(s) Oral daily  folic acid 1 milliGRAM(s) Oral daily  memantine 10 milliGRAM(s) Oral two times a day  mirtazapine 22.5 milliGRAM(s) Oral at bedtime  pantoprazole    Tablet 40 milliGRAM(s) Oral before breakfast  risperiDONE   Tablet 0.25 milliGRAM(s) Oral <User Schedule>  risperiDONE   Tablet 0.5 milliGRAM(s) Oral at bedtime    MEDICATIONS  (PRN):  aluminum hydroxide/magnesium hydroxide/simethicone Suspension 30 milliLiter(s) Oral every 4 hours PRN Dyspepsia  artificial  tears Solution 1 Drop(s) Both EYES four times a day PRN eye irritation  glycerin Suppository - Adult 1 Suppository(s) Rectal daily PRN constipation  risperiDONE   Tablet 0.25 milliGRAM(s) Oral every 8 hours PRN Agitation  saline laxative (FLEET) Rectal Enema 1 Enema Rectal daily PRN constipation

## 2021-04-24 RX ADMIN — Medication 1000 UNIT(S): at 08:44

## 2021-04-24 RX ADMIN — MEMANTINE HYDROCHLORIDE 10 MILLIGRAM(S): 10 TABLET ORAL at 08:44

## 2021-04-24 RX ADMIN — PANTOPRAZOLE SODIUM 40 MILLIGRAM(S): 20 TABLET, DELAYED RELEASE ORAL at 08:44

## 2021-04-24 RX ADMIN — RISPERIDONE 0.25 MILLIGRAM(S): 4 TABLET ORAL at 08:45

## 2021-04-24 RX ADMIN — Medication 1 MILLIGRAM(S): at 08:43

## 2021-04-24 RX ADMIN — FINASTERIDE 5 MILLIGRAM(S): 5 TABLET, FILM COATED ORAL at 08:43

## 2021-04-24 RX ADMIN — AMLODIPINE BESYLATE 5 MILLIGRAM(S): 2.5 TABLET ORAL at 08:44

## 2021-04-24 RX ADMIN — RISPERIDONE 0.25 MILLIGRAM(S): 4 TABLET ORAL at 17:18

## 2021-04-24 RX ADMIN — MIRTAZAPINE 22.5 MILLIGRAM(S): 45 TABLET, ORALLY DISINTEGRATING ORAL at 20:12

## 2021-04-24 RX ADMIN — MEMANTINE HYDROCHLORIDE 10 MILLIGRAM(S): 10 TABLET ORAL at 20:12

## 2021-04-24 RX ADMIN — RISPERIDONE 0.5 MILLIGRAM(S): 4 TABLET ORAL at 20:12

## 2021-04-25 RX ADMIN — RISPERIDONE 0.25 MILLIGRAM(S): 4 TABLET ORAL at 08:44

## 2021-04-25 RX ADMIN — FINASTERIDE 5 MILLIGRAM(S): 5 TABLET, FILM COATED ORAL at 08:44

## 2021-04-25 RX ADMIN — AMLODIPINE BESYLATE 5 MILLIGRAM(S): 2.5 TABLET ORAL at 08:44

## 2021-04-25 RX ADMIN — MIRTAZAPINE 22.5 MILLIGRAM(S): 45 TABLET, ORALLY DISINTEGRATING ORAL at 20:10

## 2021-04-25 RX ADMIN — RISPERIDONE 0.5 MILLIGRAM(S): 4 TABLET ORAL at 20:11

## 2021-04-25 RX ADMIN — MEMANTINE HYDROCHLORIDE 10 MILLIGRAM(S): 10 TABLET ORAL at 20:11

## 2021-04-25 RX ADMIN — Medication 1 APPLICATION(S): at 20:12

## 2021-04-25 RX ADMIN — MEMANTINE HYDROCHLORIDE 10 MILLIGRAM(S): 10 TABLET ORAL at 08:43

## 2021-04-25 RX ADMIN — RISPERIDONE 0.25 MILLIGRAM(S): 4 TABLET ORAL at 17:25

## 2021-04-25 RX ADMIN — PANTOPRAZOLE SODIUM 40 MILLIGRAM(S): 20 TABLET, DELAYED RELEASE ORAL at 08:44

## 2021-04-25 RX ADMIN — Medication 1000 UNIT(S): at 08:43

## 2021-04-25 RX ADMIN — Medication 1 MILLIGRAM(S): at 08:43

## 2021-04-26 PROCEDURE — 99232 SBSQ HOSP IP/OBS MODERATE 35: CPT

## 2021-04-26 RX ADMIN — Medication 1 MILLIGRAM(S): at 08:42

## 2021-04-26 RX ADMIN — MIRTAZAPINE 22.5 MILLIGRAM(S): 45 TABLET, ORALLY DISINTEGRATING ORAL at 20:53

## 2021-04-26 RX ADMIN — RISPERIDONE 0.5 MILLIGRAM(S): 4 TABLET ORAL at 20:53

## 2021-04-26 RX ADMIN — RISPERIDONE 0.25 MILLIGRAM(S): 4 TABLET ORAL at 16:16

## 2021-04-26 RX ADMIN — MEMANTINE HYDROCHLORIDE 10 MILLIGRAM(S): 10 TABLET ORAL at 08:41

## 2021-04-26 RX ADMIN — Medication 1 APPLICATION(S): at 20:52

## 2021-04-26 RX ADMIN — AMLODIPINE BESYLATE 5 MILLIGRAM(S): 2.5 TABLET ORAL at 08:43

## 2021-04-26 RX ADMIN — FINASTERIDE 5 MILLIGRAM(S): 5 TABLET, FILM COATED ORAL at 08:41

## 2021-04-26 RX ADMIN — RISPERIDONE 0.25 MILLIGRAM(S): 4 TABLET ORAL at 08:42

## 2021-04-26 RX ADMIN — Medication 1000 UNIT(S): at 08:43

## 2021-04-26 RX ADMIN — MEMANTINE HYDROCHLORIDE 10 MILLIGRAM(S): 10 TABLET ORAL at 20:53

## 2021-04-26 RX ADMIN — PANTOPRAZOLE SODIUM 40 MILLIGRAM(S): 20 TABLET, DELAYED RELEASE ORAL at 08:42

## 2021-04-26 NOTE — BH INPATIENT PSYCHIATRY PROGRESS NOTE - NSBHASSESSSUMMFT_PSY_ALL_CORE
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  Recent neuropsych eval consistent with fronto-temporal dementia (FTD)  On evaluation, patient remains perseverative about his food/ GI symptoms. Appetite and sleep remain adequate. No self-injurious behavior or any other behavioral concern reported. Patient remains med compliant.   Plan: Will c/w Memantine, Mirtazapine, and Risperidone at current dose. Dispo to SNF once accepted (pt will be interviewed this evening by SNF staff)

## 2021-04-26 NOTE — BH INPATIENT PSYCHIATRY PROGRESS NOTE - NSBHFUPINTERVALHXFT_PSY_A_CORE
Patient seen for depression and behavioral disturbance. Chart reviewed and case discussed with nursing staff. Per staff, pt remains focused on food and bowels and always asks for extra portions. On encounter today, all patient talks about is how fast he eats and how uncomfortable he feels after. Patient once again encouraged to eat slowly and eat smaller portions. No other concern reported. Patient denies SI/ HI. No hallucinations reported. He remains med compliant.

## 2021-04-26 NOTE — BH INPATIENT PSYCHIATRY PROGRESS NOTE - NSBHCHARTREVIEWVS_PSY_A_CORE FT
Vital Signs Last 24 Hrs  T(C): 36.5 (26 Apr 2021 07:49), Max: 37.1 (25 Apr 2021 15:34)  T(F): 97.7 (26 Apr 2021 07:49), Max: 98.8 (25 Apr 2021 15:34)  RR: 18 (26 Apr 2021 07:49) (18 - 18)    04-26-21 @ 07:49  Sitting BP: 127/76 HR: 63

## 2021-04-26 NOTE — BH INPATIENT PSYCHIATRY PROGRESS NOTE - MSE UNSTRUCTURED FT
The patient appears stated age, dressed in hospital gowns. Cooperative, but gives limited answers to most questions. No PMA/PMR noted. The patient's speech is fluent but soft. Reported mood "ok" with constricted affect. Thought process grossly preservative. Thought content: somatically preoccupied with GI symptoms, denies current SI/HI. No perceptual disturbance appreciated. Insight and judgment are limited. Impulse control intact at the time of exam. Alert and oriented X4

## 2021-04-27 PROCEDURE — 99232 SBSQ HOSP IP/OBS MODERATE 35: CPT

## 2021-04-27 RX ADMIN — RISPERIDONE 0.5 MILLIGRAM(S): 4 TABLET ORAL at 21:33

## 2021-04-27 RX ADMIN — AMLODIPINE BESYLATE 5 MILLIGRAM(S): 2.5 TABLET ORAL at 08:14

## 2021-04-27 RX ADMIN — RISPERIDONE 0.25 MILLIGRAM(S): 4 TABLET ORAL at 08:14

## 2021-04-27 RX ADMIN — MIRTAZAPINE 22.5 MILLIGRAM(S): 45 TABLET, ORALLY DISINTEGRATING ORAL at 21:33

## 2021-04-27 RX ADMIN — PANTOPRAZOLE SODIUM 40 MILLIGRAM(S): 20 TABLET, DELAYED RELEASE ORAL at 08:15

## 2021-04-27 RX ADMIN — Medication 1 MILLIGRAM(S): at 08:15

## 2021-04-27 RX ADMIN — FINASTERIDE 5 MILLIGRAM(S): 5 TABLET, FILM COATED ORAL at 08:15

## 2021-04-27 RX ADMIN — RISPERIDONE 0.25 MILLIGRAM(S): 4 TABLET ORAL at 17:49

## 2021-04-27 RX ADMIN — Medication 1000 UNIT(S): at 08:15

## 2021-04-27 RX ADMIN — Medication 1 APPLICATION(S): at 21:34

## 2021-04-27 RX ADMIN — MEMANTINE HYDROCHLORIDE 10 MILLIGRAM(S): 10 TABLET ORAL at 08:15

## 2021-04-27 RX ADMIN — MEMANTINE HYDROCHLORIDE 10 MILLIGRAM(S): 10 TABLET ORAL at 21:33

## 2021-04-27 NOTE — BH INPATIENT PSYCHIATRY PROGRESS NOTE - NSBHFUPINTERVALHXFT_PSY_A_CORE
Patient seen for depression and behavioral disturbance. Chart reviewed and case discussed with nursing staff. No significant overnight event reported. On encounter, patient reports his mood as "ok"; is not as focused on his GI symptoms today. When asked about his meeting with the rep from the nursing home yesterday, he states that it went well and he has no objections going to that particular nursing home. No other concern reported. Patient denies SI/ HI. No hallucinations reported. He remains med compliant.

## 2021-04-27 NOTE — BH INPATIENT PSYCHIATRY PROGRESS NOTE - NSBHASSESSSUMMFT_PSY_ALL_CORE
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  Recent neuropsych eval consistent with fronto-temporal dementia (FTD)  On evaluation, patient is less perseverative about his GI symptoms. Is in agreement with being discharged to a nursing home (meeting with SNF rep yesterday went well). No self-injurious behavior or any other behavioral concern reported. Patient remains med compliant.   Plan: Will c/w Memantine, Mirtazapine, and Risperidone at current dose. Dispo to SNF once accepted.

## 2021-04-27 NOTE — BH INPATIENT PSYCHIATRY PROGRESS NOTE - NSBHCHARTREVIEWVS_PSY_A_CORE FT
Vital Signs Last 24 Hrs  T(C): 36.4 (27 Apr 2021 05:37), Max: 37.2 (26 Apr 2021 15:35)  T(F): 97.5 (27 Apr 2021 05:37), Max: 99 (26 Apr 2021 15:35)  Orthostatic VS  04-27-21 @ 05:37  Lying BP: 157/83 HR: 91   Sitting BP: 154/88 HR: 79  Site: upper left arm   Mode: electronic

## 2021-04-27 NOTE — BH INPATIENT PSYCHIATRY PROGRESS NOTE - MSE UNSTRUCTURED FT
The patient appears stated age, dressed in hospital gowns. Cooperative, but gives limited answers to most questions. No PMA/PMR noted. The patient's speech is fluent but soft. Reported mood "ok" with constricted affect. Thought process grossly preservative. Thought content: less somatically preoccupied today, denies current SI/HI. No perceptual disturbance appreciated. Insight and judgment are limited. Impulse control intact at the time of exam. Alert and oriented X4

## 2021-04-28 PROCEDURE — 99232 SBSQ HOSP IP/OBS MODERATE 35: CPT

## 2021-04-28 RX ADMIN — MEMANTINE HYDROCHLORIDE 10 MILLIGRAM(S): 10 TABLET ORAL at 08:46

## 2021-04-28 RX ADMIN — Medication 1 APPLICATION(S): at 22:16

## 2021-04-28 RX ADMIN — Medication 1 MILLIGRAM(S): at 08:46

## 2021-04-28 RX ADMIN — RISPERIDONE 0.5 MILLIGRAM(S): 4 TABLET ORAL at 20:37

## 2021-04-28 RX ADMIN — RISPERIDONE 0.25 MILLIGRAM(S): 4 TABLET ORAL at 08:46

## 2021-04-28 RX ADMIN — AMLODIPINE BESYLATE 5 MILLIGRAM(S): 2.5 TABLET ORAL at 08:47

## 2021-04-28 RX ADMIN — Medication 1000 UNIT(S): at 08:46

## 2021-04-28 RX ADMIN — MIRTAZAPINE 22.5 MILLIGRAM(S): 45 TABLET, ORALLY DISINTEGRATING ORAL at 20:37

## 2021-04-28 RX ADMIN — FINASTERIDE 5 MILLIGRAM(S): 5 TABLET, FILM COATED ORAL at 08:47

## 2021-04-28 RX ADMIN — RISPERIDONE 0.25 MILLIGRAM(S): 4 TABLET ORAL at 17:08

## 2021-04-28 RX ADMIN — PANTOPRAZOLE SODIUM 40 MILLIGRAM(S): 20 TABLET, DELAYED RELEASE ORAL at 08:46

## 2021-04-28 RX ADMIN — MEMANTINE HYDROCHLORIDE 10 MILLIGRAM(S): 10 TABLET ORAL at 20:37

## 2021-04-28 RX ADMIN — Medication 1 APPLICATION(S): at 10:48

## 2021-04-28 NOTE — BH INPATIENT PSYCHIATRY PROGRESS NOTE - NSBHFUPINTERVALHXFT_PSY_A_CORE
Patient seen for depression and behavioral disturbance. Chart reviewed and case discussed with nursing staff. No significant overnight event reported. On encounter, patient reports that he won't be eating breakfast as he ate too much last night. No other concern reported. Patient denies SI/ HI. No hallucinations reported. He remains med compliant.

## 2021-04-28 NOTE — BH INPATIENT PSYCHIATRY PROGRESS NOTE - NSBHCHARTREVIEWVS_PSY_A_CORE FT
Vital Signs Last 24 Hrs  T(C): 36.2 (28 Apr 2021 08:08), Max: 36.9 (27 Apr 2021 15:30)  T(F): 97.1 (28 Apr 2021 08:08), Max: 98.5 (27 Apr 2021 15:30)  RR: 16 (28 Apr 2021 08:08) (16 - 16)  Orthostatic VS  04-28-21 @ 08:08  Lying BP: 124/68 HR: 60   Sitting BP: 111/68 HR: 62

## 2021-04-28 NOTE — BH INPATIENT PSYCHIATRY PROGRESS NOTE - MSE UNSTRUCTURED FT
The patient appears stated age, dressed in hospital gowns. Cooperative, but gives limited answers to most questions. No PMA/PMR noted. The patient's speech is fluent but soft. Reported mood "ok" with constricted affect. Thought process grossly preservative. Thought content: somatically preoccupied, denies current SI/HI. No perceptual disturbance appreciated. Insight and judgment are limited. Impulse control intact at the time of exam. Alert and oriented X4

## 2021-04-28 NOTE — BH INPATIENT PSYCHIATRY PROGRESS NOTE - NSBHASSESSSUMMFT_PSY_ALL_CORE
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  Recent neuropsych eval consistent with fronto-temporal dementia (FTD)  On evaluation, patient is once again perseverative about his GI symptoms. No other concerns reported. Patient remains med compliant.   Plan: Will c/w Memantine, Mirtazapine, and Risperidone at current dose. Dispo to SNF once accepted.

## 2021-04-29 PROCEDURE — 99232 SBSQ HOSP IP/OBS MODERATE 35: CPT

## 2021-04-29 RX ORDER — ERYTHROMYCIN BASE 5 MG/GRAM
1 OINTMENT (GRAM) OPHTHALMIC (EYE) THREE TIMES A DAY
Refills: 0 | Status: DISCONTINUED | OUTPATIENT
Start: 2021-04-29 | End: 2021-05-11

## 2021-04-29 RX ADMIN — Medication 1 APPLICATION(S): at 21:40

## 2021-04-29 RX ADMIN — Medication 1 MILLIGRAM(S): at 09:23

## 2021-04-29 RX ADMIN — MIRTAZAPINE 22.5 MILLIGRAM(S): 45 TABLET, ORALLY DISINTEGRATING ORAL at 20:20

## 2021-04-29 RX ADMIN — Medication 1 APPLICATION(S): at 11:02

## 2021-04-29 RX ADMIN — RISPERIDONE 0.25 MILLIGRAM(S): 4 TABLET ORAL at 09:23

## 2021-04-29 RX ADMIN — MEMANTINE HYDROCHLORIDE 10 MILLIGRAM(S): 10 TABLET ORAL at 09:22

## 2021-04-29 RX ADMIN — AMLODIPINE BESYLATE 5 MILLIGRAM(S): 2.5 TABLET ORAL at 09:23

## 2021-04-29 RX ADMIN — PANTOPRAZOLE SODIUM 40 MILLIGRAM(S): 20 TABLET, DELAYED RELEASE ORAL at 09:23

## 2021-04-29 RX ADMIN — RISPERIDONE 0.25 MILLIGRAM(S): 4 TABLET ORAL at 18:19

## 2021-04-29 RX ADMIN — Medication 1000 UNIT(S): at 09:22

## 2021-04-29 RX ADMIN — Medication 1 APPLICATION(S): at 21:39

## 2021-04-29 RX ADMIN — MEMANTINE HYDROCHLORIDE 10 MILLIGRAM(S): 10 TABLET ORAL at 20:19

## 2021-04-29 RX ADMIN — RISPERIDONE 0.5 MILLIGRAM(S): 4 TABLET ORAL at 20:19

## 2021-04-29 RX ADMIN — FINASTERIDE 5 MILLIGRAM(S): 5 TABLET, FILM COATED ORAL at 09:23

## 2021-04-29 NOTE — BH INPATIENT PSYCHIATRY PROGRESS NOTE - NSBHFUPINTERVALHXFT_PSY_A_CORE
Patient seen for depression and behavioral disturbance. Chart reviewed and case discussed with nursing staff. No significant overnight event reported. Patient has eye discharge from both eyes. On encounter, patient continues to perseverate about food. No other concern reported. Patient denies SI/ HI. No hallucinations reported. He remains med compliant.

## 2021-04-29 NOTE — BH INPATIENT PSYCHIATRY PROGRESS NOTE - CURRENT MEDICATION
MEDICATIONS  (STANDING):  amLODIPine   Tablet 5 milliGRAM(s) Oral daily  ammonium lactate 12% Lotion 1 Application(s) Topical two times a day  cholecalciferol 1000 Unit(s) Oral daily  erythromycin   Ointment 1 Application(s) Both EYES three times a day  finasteride 5 milliGRAM(s) Oral daily  folic acid 1 milliGRAM(s) Oral daily  memantine 10 milliGRAM(s) Oral two times a day  mirtazapine 22.5 milliGRAM(s) Oral at bedtime  pantoprazole    Tablet 40 milliGRAM(s) Oral before breakfast  risperiDONE   Tablet 0.25 milliGRAM(s) Oral <User Schedule>  risperiDONE   Tablet 0.5 milliGRAM(s) Oral at bedtime    MEDICATIONS  (PRN):  aluminum hydroxide/magnesium hydroxide/simethicone Suspension 30 milliLiter(s) Oral every 4 hours PRN Dyspepsia  artificial  tears Solution 1 Drop(s) Both EYES four times a day PRN eye irritation  glycerin Suppository - Adult 1 Suppository(s) Rectal daily PRN constipation  risperiDONE   Tablet 0.25 milliGRAM(s) Oral every 8 hours PRN Agitation  saline laxative (FLEET) Rectal Enema 1 Enema Rectal daily PRN constipation

## 2021-04-29 NOTE — BH INPATIENT PSYCHIATRY PROGRESS NOTE - MSE UNSTRUCTURED FT
The patient appears stated age, dressed in hospital gowns. Cooperative, but gives limited answers to most questions. No PMA/PMR noted. The patient's speech is fluent but soft. Reported mood "full" with constricted affect. Thought process grossly preservative. Thought content: somatically preoccupied, denies current SI/HI. No perceptual disturbance appreciated. Insight and judgment are limited. Impulse control intact at the time of exam. Alert and oriented X4

## 2021-04-29 NOTE — BH INPATIENT PSYCHIATRY PROGRESS NOTE - NSBHASSESSSUMMFT_PSY_ALL_CORE
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  Recent neuropsych eval consistent with fronto-temporal dementia (FTD)  On evaluation, patient remains perseverative about his food/ GI symptoms. No other concerns reported. Patient remains med compliant.   Plan: Will c/w Memantine, Mirtazapine, and Risperidone at current dose. Dispo to SNF once accepted.

## 2021-04-29 NOTE — BH INPATIENT PSYCHIATRY PROGRESS NOTE - NSBHMETABOLIC_PSY_ALL_CORE_FT
BMI: BMI (kg/m2): 22.7 (04-17-21 @ 16:07)  HbA1c: A1C with Estimated Average Glucose Result: 5.5 % (03-23-21 @ 09:42)    Glucose:   BP: --  Lipid Panel: Date/Time: 03-23-21 @ 09:42  Cholesterol, Serum: 172  Direct LDL: --  HDL Cholesterol, Serum: 60  Total Cholesterol/HDL Ration Measurement: --  Triglycerides, Serum: 60

## 2021-04-29 NOTE — BH INPATIENT PSYCHIATRY PROGRESS NOTE - NSBHCHARTREVIEWVS_PSY_A_CORE FT
Vital Signs Last 24 Hrs  T(C): 36.6 (29 Apr 2021 07:53), Max: 36.7 (28 Apr 2021 15:35)  T(F): 97.8 (29 Apr 2021 07:53), Max: 98.1 (28 Apr 2021 15:35)  RR: 17 (29 Apr 2021 07:53) (17 - 17)  Orthostatic VS  04-29-21 @ 07:53  Sitting BP: 125/69 HR: 69  Standing BP: 120/72 HR: 60  Site: upper left arm   Mode: electronic

## 2021-04-30 LAB
ALBUMIN SERPL ELPH-MCNC: 4 G/DL — SIGNIFICANT CHANGE UP (ref 3.3–5)
ALP SERPL-CCNC: 39 U/L — LOW (ref 40–120)
ALT FLD-CCNC: 10 U/L — SIGNIFICANT CHANGE UP (ref 4–41)
ANION GAP SERPL CALC-SCNC: 11 MMOL/L — SIGNIFICANT CHANGE UP (ref 7–14)
AST SERPL-CCNC: 17 U/L — SIGNIFICANT CHANGE UP (ref 4–40)
BILIRUB SERPL-MCNC: 0.3 MG/DL — SIGNIFICANT CHANGE UP (ref 0.2–1.2)
BUN SERPL-MCNC: 16 MG/DL — SIGNIFICANT CHANGE UP (ref 7–23)
CALCIUM SERPL-MCNC: 8.9 MG/DL — SIGNIFICANT CHANGE UP (ref 8.4–10.5)
CHLORIDE SERPL-SCNC: 99 MMOL/L — SIGNIFICANT CHANGE UP (ref 98–107)
CO2 SERPL-SCNC: 25 MMOL/L — SIGNIFICANT CHANGE UP (ref 22–31)
CREAT SERPL-MCNC: 0.8 MG/DL — SIGNIFICANT CHANGE UP (ref 0.5–1.3)
GLUCOSE SERPL-MCNC: 87 MG/DL — SIGNIFICANT CHANGE UP (ref 70–99)
HCT VFR BLD CALC: 35.9 % — LOW (ref 39–50)
HGB BLD-MCNC: 11.5 G/DL — LOW (ref 13–17)
MCHC RBC-ENTMCNC: 29 PG — SIGNIFICANT CHANGE UP (ref 27–34)
MCHC RBC-ENTMCNC: 32 GM/DL — SIGNIFICANT CHANGE UP (ref 32–36)
MCV RBC AUTO: 90.7 FL — SIGNIFICANT CHANGE UP (ref 80–100)
NRBC # BLD: 0 /100 WBCS — SIGNIFICANT CHANGE UP
NRBC # FLD: 0 K/UL — SIGNIFICANT CHANGE UP
PLATELET # BLD AUTO: 225 K/UL — SIGNIFICANT CHANGE UP (ref 150–400)
POTASSIUM SERPL-MCNC: 3.9 MMOL/L — SIGNIFICANT CHANGE UP (ref 3.5–5.3)
POTASSIUM SERPL-SCNC: 3.9 MMOL/L — SIGNIFICANT CHANGE UP (ref 3.5–5.3)
PROT SERPL-MCNC: 7 G/DL — SIGNIFICANT CHANGE UP (ref 6–8.3)
RBC # BLD: 3.96 M/UL — LOW (ref 4.2–5.8)
RBC # FLD: 13 % — SIGNIFICANT CHANGE UP (ref 10.3–14.5)
SODIUM SERPL-SCNC: 135 MMOL/L — SIGNIFICANT CHANGE UP (ref 135–145)
WBC # BLD: 5.13 K/UL — SIGNIFICANT CHANGE UP (ref 3.8–10.5)
WBC # FLD AUTO: 5.13 K/UL — SIGNIFICANT CHANGE UP (ref 3.8–10.5)

## 2021-04-30 PROCEDURE — 99232 SBSQ HOSP IP/OBS MODERATE 35: CPT

## 2021-04-30 PROCEDURE — 93010 ELECTROCARDIOGRAM REPORT: CPT

## 2021-04-30 RX ADMIN — RISPERIDONE 0.5 MILLIGRAM(S): 4 TABLET ORAL at 20:20

## 2021-04-30 RX ADMIN — Medication 1000 UNIT(S): at 08:53

## 2021-04-30 RX ADMIN — Medication 1 APPLICATION(S): at 20:21

## 2021-04-30 RX ADMIN — RISPERIDONE 0.25 MILLIGRAM(S): 4 TABLET ORAL at 17:35

## 2021-04-30 RX ADMIN — MEMANTINE HYDROCHLORIDE 10 MILLIGRAM(S): 10 TABLET ORAL at 08:54

## 2021-04-30 RX ADMIN — AMLODIPINE BESYLATE 5 MILLIGRAM(S): 2.5 TABLET ORAL at 08:54

## 2021-04-30 RX ADMIN — PANTOPRAZOLE SODIUM 40 MILLIGRAM(S): 20 TABLET, DELAYED RELEASE ORAL at 08:54

## 2021-04-30 RX ADMIN — MIRTAZAPINE 22.5 MILLIGRAM(S): 45 TABLET, ORALLY DISINTEGRATING ORAL at 20:20

## 2021-04-30 RX ADMIN — Medication 1 APPLICATION(S): at 08:55

## 2021-04-30 RX ADMIN — Medication 1 APPLICATION(S): at 14:03

## 2021-04-30 RX ADMIN — Medication 1 MILLIGRAM(S): at 08:53

## 2021-04-30 RX ADMIN — RISPERIDONE 0.25 MILLIGRAM(S): 4 TABLET ORAL at 08:54

## 2021-04-30 RX ADMIN — MEMANTINE HYDROCHLORIDE 10 MILLIGRAM(S): 10 TABLET ORAL at 20:20

## 2021-04-30 RX ADMIN — Medication 1 DROP(S): at 20:21

## 2021-04-30 RX ADMIN — Medication 1 APPLICATION(S): at 22:27

## 2021-04-30 RX ADMIN — FINASTERIDE 5 MILLIGRAM(S): 5 TABLET, FILM COATED ORAL at 08:53

## 2021-04-30 NOTE — BH INPATIENT PSYCHIATRY PROGRESS NOTE - MSE UNSTRUCTURED FT
The patient appears stated age, dressed casual clothes. Cooperative, but gives limited answers to most questions. No PMA/PMR noted. The patient's speech is fluent but soft. Reported mood "not good" with constricted affect. Thought process grossly preservative. Thought content: somatically preoccupied, believes he is going to die, denies current SI/HI. No perceptual disturbance appreciated. Insight and judgment are limited. Impulse control intact at the time of exam. Alert and oriented X4

## 2021-04-30 NOTE — BH INPATIENT PSYCHIATRY PROGRESS NOTE - NSBHCHARTREVIEWVS_PSY_A_CORE FT
Vital Signs Last 24 Hrs  T(C): 36.7 (30 Apr 2021 05:51), Max: 36.7 (30 Apr 2021 05:51)  T(F): 98.1 (30 Apr 2021 05:51), Max: 98.1 (30 Apr 2021 05:51)    04-30-21 @ 05:51  Lying BP: 142/83 HR: 60   Site: upper left arm   Mode: electronic

## 2021-04-30 NOTE — BH INPATIENT PSYCHIATRY PROGRESS NOTE - NSBHASSESSSUMMFT_PSY_ALL_CORE
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  Recent neuropsych eval consistent with fronto-temporal dementia (FTD)  On evaluation, patient remains perseverative about his food/ GI symptoms, believes he is going to die, believes he is worse off now than when he first got admitted. ECT discussed with patient and he is open to it. Patient remains med compliant.   Plan: Will c/w Memantine, Mirtazapine, and Risperidone at current dose. Will order labs and place ECT consult.

## 2021-04-30 NOTE — BH INPATIENT PSYCHIATRY PROGRESS NOTE - NSBHFUPINTERVALHXFT_PSY_A_CORE
Patient seen for depression and behavioral disturbance. Chart reviewed and case discussed with nursing staff. No significant overnight event reported. Patient's eye discharge has improved. On encounter, patient continues to perseverate about food, states he does not feel good, thinks he is going to die. ECT discussed with patient and he is willing to give it a try. Patient denies SI/ HI. No hallucinations reported. He remains med compliant.

## 2021-04-30 NOTE — BH INPATIENT PSYCHIATRY PROGRESS NOTE - CURRENT MEDICATION
MEDICATIONS  (STANDING):  amLODIPine   Tablet 5 milliGRAM(s) Oral daily  ammonium lactate 12% Lotion 1 Application(s) Topical two times a day  cholecalciferol 1000 Unit(s) Oral daily  erythromycin   Ointment 1 Application(s) Both EYES three times a day  finasteride 5 milliGRAM(s) Oral daily  folic acid 1 milliGRAM(s) Oral daily  memantine 10 milliGRAM(s) Oral two times a day  mirtazapine 22.5 milliGRAM(s) Oral at bedtime  pantoprazole    Tablet 40 milliGRAM(s) Oral before breakfast  risperiDONE   Tablet 0.5 milliGRAM(s) Oral at bedtime  risperiDONE   Tablet 0.25 milliGRAM(s) Oral <User Schedule>    MEDICATIONS  (PRN):  aluminum hydroxide/magnesium hydroxide/simethicone Suspension 30 milliLiter(s) Oral every 4 hours PRN Dyspepsia  artificial  tears Solution 1 Drop(s) Both EYES four times a day PRN eye irritation  glycerin Suppository - Adult 1 Suppository(s) Rectal daily PRN constipation  risperiDONE   Tablet 0.25 milliGRAM(s) Oral every 8 hours PRN Agitation  saline laxative (FLEET) Rectal Enema 1 Enema Rectal daily PRN constipation

## 2021-05-01 RX ADMIN — FINASTERIDE 5 MILLIGRAM(S): 5 TABLET, FILM COATED ORAL at 08:54

## 2021-05-01 RX ADMIN — Medication 1 APPLICATION(S): at 10:28

## 2021-05-01 RX ADMIN — MEMANTINE HYDROCHLORIDE 10 MILLIGRAM(S): 10 TABLET ORAL at 21:18

## 2021-05-01 RX ADMIN — MEMANTINE HYDROCHLORIDE 10 MILLIGRAM(S): 10 TABLET ORAL at 08:53

## 2021-05-01 RX ADMIN — Medication 1 MILLIGRAM(S): at 08:53

## 2021-05-01 RX ADMIN — Medication 1 APPLICATION(S): at 21:39

## 2021-05-01 RX ADMIN — AMLODIPINE BESYLATE 5 MILLIGRAM(S): 2.5 TABLET ORAL at 08:53

## 2021-05-01 RX ADMIN — MIRTAZAPINE 22.5 MILLIGRAM(S): 45 TABLET, ORALLY DISINTEGRATING ORAL at 21:18

## 2021-05-01 RX ADMIN — Medication 1 APPLICATION(S): at 22:15

## 2021-05-01 RX ADMIN — RISPERIDONE 0.25 MILLIGRAM(S): 4 TABLET ORAL at 08:53

## 2021-05-01 RX ADMIN — RISPERIDONE 0.5 MILLIGRAM(S): 4 TABLET ORAL at 21:17

## 2021-05-01 RX ADMIN — PANTOPRAZOLE SODIUM 40 MILLIGRAM(S): 20 TABLET, DELAYED RELEASE ORAL at 08:52

## 2021-05-01 RX ADMIN — Medication 1000 UNIT(S): at 08:52

## 2021-05-01 RX ADMIN — RISPERIDONE 0.25 MILLIGRAM(S): 4 TABLET ORAL at 17:18

## 2021-05-02 RX ADMIN — Medication 1 APPLICATION(S): at 21:24

## 2021-05-02 RX ADMIN — Medication 1000 UNIT(S): at 09:59

## 2021-05-02 RX ADMIN — Medication 30 MILLILITER(S): at 09:59

## 2021-05-02 RX ADMIN — Medication 1 APPLICATION(S): at 10:05

## 2021-05-02 RX ADMIN — MEMANTINE HYDROCHLORIDE 10 MILLIGRAM(S): 10 TABLET ORAL at 21:23

## 2021-05-02 RX ADMIN — FINASTERIDE 5 MILLIGRAM(S): 5 TABLET, FILM COATED ORAL at 09:59

## 2021-05-02 RX ADMIN — Medication 1 APPLICATION(S): at 22:07

## 2021-05-02 RX ADMIN — PANTOPRAZOLE SODIUM 40 MILLIGRAM(S): 20 TABLET, DELAYED RELEASE ORAL at 07:53

## 2021-05-02 RX ADMIN — RISPERIDONE 0.5 MILLIGRAM(S): 4 TABLET ORAL at 21:23

## 2021-05-02 RX ADMIN — Medication 1 APPLICATION(S): at 10:01

## 2021-05-02 RX ADMIN — MEMANTINE HYDROCHLORIDE 10 MILLIGRAM(S): 10 TABLET ORAL at 09:59

## 2021-05-02 RX ADMIN — RISPERIDONE 0.25 MILLIGRAM(S): 4 TABLET ORAL at 10:00

## 2021-05-02 RX ADMIN — MIRTAZAPINE 22.5 MILLIGRAM(S): 45 TABLET, ORALLY DISINTEGRATING ORAL at 21:23

## 2021-05-02 RX ADMIN — AMLODIPINE BESYLATE 5 MILLIGRAM(S): 2.5 TABLET ORAL at 10:00

## 2021-05-02 RX ADMIN — Medication 1 APPLICATION(S): at 13:08

## 2021-05-02 RX ADMIN — Medication 1 MILLIGRAM(S): at 09:59

## 2021-05-02 RX ADMIN — RISPERIDONE 0.25 MILLIGRAM(S): 4 TABLET ORAL at 17:18

## 2021-05-03 PROCEDURE — 99232 SBSQ HOSP IP/OBS MODERATE 35: CPT

## 2021-05-03 PROCEDURE — 99223 1ST HOSP IP/OBS HIGH 75: CPT

## 2021-05-03 RX ADMIN — PANTOPRAZOLE SODIUM 40 MILLIGRAM(S): 20 TABLET, DELAYED RELEASE ORAL at 08:09

## 2021-05-03 RX ADMIN — MEMANTINE HYDROCHLORIDE 10 MILLIGRAM(S): 10 TABLET ORAL at 08:10

## 2021-05-03 RX ADMIN — MIRTAZAPINE 22.5 MILLIGRAM(S): 45 TABLET, ORALLY DISINTEGRATING ORAL at 20:06

## 2021-05-03 RX ADMIN — Medication 1000 UNIT(S): at 08:10

## 2021-05-03 RX ADMIN — Medication 1 MILLIGRAM(S): at 08:10

## 2021-05-03 RX ADMIN — Medication 1 APPLICATION(S): at 20:07

## 2021-05-03 RX ADMIN — RISPERIDONE 0.25 MILLIGRAM(S): 4 TABLET ORAL at 08:10

## 2021-05-03 RX ADMIN — FINASTERIDE 5 MILLIGRAM(S): 5 TABLET, FILM COATED ORAL at 08:10

## 2021-05-03 RX ADMIN — Medication 1 APPLICATION(S): at 08:14

## 2021-05-03 RX ADMIN — RISPERIDONE 0.25 MILLIGRAM(S): 4 TABLET ORAL at 17:36

## 2021-05-03 RX ADMIN — RISPERIDONE 0.5 MILLIGRAM(S): 4 TABLET ORAL at 20:06

## 2021-05-03 RX ADMIN — Medication 1 APPLICATION(S): at 13:06

## 2021-05-03 RX ADMIN — MEMANTINE HYDROCHLORIDE 10 MILLIGRAM(S): 10 TABLET ORAL at 20:06

## 2021-05-03 NOTE — BH INPATIENT PSYCHIATRY PROGRESS NOTE - NSBHASSESSSUMMFT_PSY_ALL_CORE
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  Recent neuropsych eval consistent with fronto-temporal dementia (FTD)  On evaluation, patient appears sad, remains perseverative about his food/ GI symptoms, believes he is going to die, ECT once again discussed with patient and he is open to it, though worries about pain from procedure. Patient remains med compliant.   Plan: Will c/w Memantine, Mirtazapine, and Risperidone at current dose. Will place ECT consult.

## 2021-05-03 NOTE — BH INPATIENT PSYCHIATRY PROGRESS NOTE - NSBHMETABOLIC_PSY_ALL_CORE_FT
BMI: BMI (kg/m2): 22.7 (04-17-21 @ 16:07)  HbA1c: A1C with Estimated Average Glucose Result: 5.5 % (03-23-21 @ 09:42)  BP: 90/60 (05-03-21 @ 12:05) (90/50 - 134/73)  Lipid Panel: Date/Time: 03-23-21 @ 09:42  Cholesterol, Serum: 172  HDL Cholesterol, Serum: 60  Triglycerides, Serum: 60

## 2021-05-03 NOTE — CONSULT NOTE ADULT - ASSESSMENT
69 yo M w/ HTN, HLD, vitD deficiency, depression, recent neuropysch diagnosis of frontotemporal dementia, presenting w/ aggression towards HHA and self injurious behavior, admitted to [facility] for further psychiatric management, being evaluated for ECT treatment.     # Pre-op evaluation  Depression with need for ECT, pre-procedure evaluation:   ECT specific risk assessment: pt without history of increased ICP, aneurysm, active pulmonary disease, valvular disease, CAD, cerebral vascular disease.     Cardiovascular risk assessment: Patient evaluated using the revised cardiac risk index and is felt to be low cardiovascular risk for a low cardiovascular risk procedure based on these criteria. RCRI 0 pts, class 1 risk. The risk has been discussed with the patient and the patient wishes to proceed understanding that ECT is a low risk procedure.     Risk for complication is low. Patient is pending dental evaluation given dentures, otherwise is optimized for ECT treatment without further intervention and can proceed with treatment.    Anesthesia specific concerns : N/A    History of adverse reaction to anesthesia previously: N/A  Esophageal Assessment:  Known Spine issues: N/A  CKD: N/A    Further risk reduction will involve medical management of other comorbid conditions:    # Essential HTN  - on amlodipine (w/ hold parameters)  - goal SBP <150 given age  - continue to monitor, SBP today appears low (SBP 90), however SBP trend was 140-150s on 4/30, and 5/1, 130s on 5/2  - if persistently on the low side, consider de-escalating anti-hypertensive therapy as wish to avoid hypotension and falls    # HLD  - not on medication, recent lipid profile noted  - c/w DASH/TLC diet    # VitD deficiency  - recent vit D level 39.4, improved  - continue to monitor, on vit D supplementation    # GERD:  c/w ppi    # MDD/dementia  - safety precautions, medication management as per psych  - plan for ECT as above  - c/w ensure enlive supplementation, encourage PO intake

## 2021-05-03 NOTE — BH INPATIENT PSYCHIATRY PROGRESS NOTE - MSE UNSTRUCTURED FT
The patient appears stated age, dressed casual clothes. Cooperative, but gives limited answers to most questions. No PMA/ PMR noted. The patient's speech is fluent but soft. Reported mood "not good" with constricted affect. Thought process grossly preservative. Thought content: somatically preoccupied, believes he is going to die, denies current SI/HI. No perceptual disturbance appreciated. Insight and judgment are limited. Impulse control intact at the time of exam. Alert and oriented X4

## 2021-05-03 NOTE — BH INPATIENT PSYCHIATRY PROGRESS NOTE - NSBHFUPINTERVALHXFT_PSY_A_CORE
Patient seen for depression and behavioral disturbance. Chart reviewed and case discussed with nursing staff. Per staff, patient continues to perseverate about his food, appears depressed. On encounter, patient states he does not feel good, thinks he is going to die because he can't eat, he however continues toe at breakfast while stating this. ECT discussed with patient and he is hesitant but willing to give it a try, thinks it'll hurt. Patient denies active SI/ HI. No hallucinations reported. He remains med compliant.

## 2021-05-03 NOTE — CONSULT NOTE ADULT - SUBJECTIVE AND OBJECTIVE BOX
HPI: 71 yo M w/ HTN, HLD, vitD deficiency, depression, recent neuropysch diagnosis of frontotemporal dementia, initially brought in by EMS for aggression towards HHA and self-injurious behavior, admitted to [facility] for further psychiatric management, being evaluated for ECT treatment. Patient reports not much appetite, has worries about his food but is otherwise without complaint. He is aware of plan for ECT and is agreeable to proceeding.     Pre-op evaluation: Patient denies history of CAD, he denies active chest pain, recent EKG negative for STEMI, reports able to do 4 METS of activity (able to walk few blocks without symptoms), denies history of CHF or signs/symptoms of heart failure, he denies prior hx of CVA, MRI w/o acute infarct, he is not diabetic on long term insulin therapy, and his pre-op Cr is <2.     RCRI score 0 pts, class 1 risk. He is a low risk patient for a low risk procedure. He is pending dental eval (given hx of dentures) but is otherwise optimized for procedure.     PAST MEDICAL & SURGICAL HISTORY:  HTN (hypertension)  Vitamin D deficiency  GERD (gastroesophageal reflux disease)  Hypercholesteremia  History of right hip replacement  Abnormality of femur    Review of Systems:   CONSTITUTIONAL: no fever/chills  EYES: no eye pain/blurry vision  ENMT:  +dentures, no sore throat  NECK: no neck pain/stiffness, denies hx of neck/back surgery  RESPIRATORY: no cough/SOB  CARDIOVASCULAR: no CP/palpitations, no hx CAD or HF, no LE edema  GASTROINTESTINAL: no N/V/abdominal pain, +issues w/ food  GENITOURINARY: no dysuria/incontinence  NEUROLOGICAL: no headache/focal weakness or loss of sensation, denies hx of CVA  SKIN: no rash/lesions   ENDOCRINE: denies diabetes or thyroid disease  MUSCULOSKELETAL: no joint pain/swelling  HEME/LYMPH: no easy bleeding or bruising  ALLERGY AND IMMUNOLOGIC: reaction to PCN?    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), lives alone, hx prior psych hospitalizations, no toxic habits    FAMILY HISTORY: no pertinent history      MEDICATIONS  (STANDING):  amLODIPine   Tablet 5 milliGRAM(s) Oral daily  ammonium lactate 12% Lotion 1 Application(s) Topical two times a day  cholecalciferol 1000 Unit(s) Oral daily  erythromycin   Ointment 1 Application(s) Both EYES three times a day  finasteride 5 milliGRAM(s) Oral daily  folic acid 1 milliGRAM(s) Oral daily  memantine 10 milliGRAM(s) Oral two times a day  mirtazapine 22.5 milliGRAM(s) Oral at bedtime  pantoprazole    Tablet 40 milliGRAM(s) Oral before breakfast  risperiDONE   Tablet 0.25 milliGRAM(s) Oral <User Schedule>  risperiDONE   Tablet 0.5 milliGRAM(s) Oral at bedtime    MEDICATIONS  (PRN):  aluminum hydroxide/magnesium hydroxide/simethicone Suspension 30 milliLiter(s) Oral every 4 hours PRN Dyspepsia  artificial  tears Solution 1 Drop(s) Both EYES four times a day PRN eye irritation  glycerin Suppository - Adult 1 Suppository(s) Rectal daily PRN constipation  risperiDONE   Tablet 0.25 milliGRAM(s) Oral every 8 hours PRN Agitation  saline laxative (FLEET) Rectal Enema 1 Enema Rectal daily PRN constipation      Vital Signs Last 24 Hrs  T(C): 36.9 (03 May 2021 15:30), Max: 36.9 (03 May 2021 15:30)  T(F): 98.5 (03 May 2021 15:30), Max: 98.5 (03 May 2021 15:30)  HR: 78 (03 May 2021 12:05) (78 - 84)  BP: 90/60 (03 May 2021 12:05) (90/50 - 90/60)  BP(mean): --  RR: --  SpO2: --  CAPILLARY BLOOD GLUCOSE    PHYSICAL EXAM:  GENERAL: NAD, elderly male sitting in chair, with lunch tray in front of him  HEAD:  NC/AT  EYES: EOMI, clear sclera/conjunctiva  ENMT: +dentures  NECK: supple, no JVD, non-tender   CHEST/LUNG: CTAB, comfortable on RA, speaking in full sentences  HEART: S1S2 RRR  ABDOMEN: soft, non-tender  EXTREMITIES:  no c/c/e  PSYCH: AOx2-3, knew May 2021, self, is in a hospital (but did not name)  NEUROLOGY: moving extremities, conversant  SKIN: no obvious rash/lesions    LABS:  Comprehensive Metabolic Panel (04.30.21 @ 14:34)   Sodium, Serum: 135 mmol/L Potassium, Serum: 3.9 mmol/L Chloride, Serum: 99 mmol/L Carbon Dioxide, Serum: 25 mmol/L   Anion Gap, Serum: 11 mmol/L   Blood Urea Nitrogen, Serum: 16 mg/dL Creatinine, Serum: 0.80 mg/dL   Glucose, Serum: 87 mg/dL Calcium, Total Serum: 8.9 mg/dL   Protein Total, Serum: 7.0 g/dL Albumin, Serum: 4.0 g/dL   Bilirubin Total, Serum: 0.3 mg/dL Alkaline Phosphatase, Serum: 39 U/L Aspartate Aminotransferase (AST/SGOT): 17 U/L Alanine Aminotransferase (ALT/SGPT): 10 U/L   eGFR if Non African American: 90 ml/min/1.73m2   EKG(personally reviewed): 4/30 sinus 80 bpm, qtc 435 ms    RADIOLOGY & ADDITIONAL TESTS:    Imaging Personally Reviewed:  < from: MR Head No Cont (04.15.21 @ 17:16) >    FINDINGS:    There are scattered few T2/FLAIR hyperintense foci in the subcortical and periventricular white matter, nonspecific finding, but most representing sequelae of mild chronic microvascular ischemic disease. There is cortical sulcal prominence related to underlying brain parenchymal volume loss.    No acute infarction, intracranial hemorrhage or mass.    The ventricles are normal without evidence of hydrocephalus. There are no extra-axial fluid collections. The skull base flow voids are present.    The patient is status post right lens replacement and scleral banding. The imaged portions of the paranasal sinuses are aerated. The mastoid air cells are clear. The visualized soft tissues and osseous structures appear unremarkable.      IMPRESSION:    Mild chronic microvascular ischemic disease.    < end of copied text >      Consultant(s) Notes Reviewed:      Care Discussed with Consultants/Other Providers:  Dr. Patel re: pre-op eval for ECT

## 2021-05-03 NOTE — BH INPATIENT PSYCHIATRY PROGRESS NOTE - NSBHCHARTREVIEWVS_PSY_A_CORE FT
Vital Signs Last 24 Hrs  T(C): 37 (02 May 2021 15:31), Max: 37 (02 May 2021 15:31)  T(F): 98.6 (02 May 2021 15:31), Max: 98.6 (02 May 2021 15:31)  HR: 78 (03 May 2021 12:05) (78 - 84)  BP: 90/60 (03 May 2021 12:05) (90/50 - 90/60)

## 2021-05-04 LAB — SARS-COV-2 RNA SPEC QL NAA+PROBE: SIGNIFICANT CHANGE UP

## 2021-05-04 PROCEDURE — 99232 SBSQ HOSP IP/OBS MODERATE 35: CPT

## 2021-05-04 RX ADMIN — RISPERIDONE 0.5 MILLIGRAM(S): 4 TABLET ORAL at 20:05

## 2021-05-04 RX ADMIN — Medication 1 APPLICATION(S): at 13:25

## 2021-05-04 RX ADMIN — Medication 1000 UNIT(S): at 09:09

## 2021-05-04 RX ADMIN — Medication 1 APPLICATION(S): at 20:05

## 2021-05-04 RX ADMIN — AMLODIPINE BESYLATE 5 MILLIGRAM(S): 2.5 TABLET ORAL at 09:09

## 2021-05-04 RX ADMIN — FINASTERIDE 5 MILLIGRAM(S): 5 TABLET, FILM COATED ORAL at 09:09

## 2021-05-04 RX ADMIN — PANTOPRAZOLE SODIUM 40 MILLIGRAM(S): 20 TABLET, DELAYED RELEASE ORAL at 09:09

## 2021-05-04 RX ADMIN — Medication 1 MILLIGRAM(S): at 09:09

## 2021-05-04 RX ADMIN — MIRTAZAPINE 22.5 MILLIGRAM(S): 45 TABLET, ORALLY DISINTEGRATING ORAL at 20:05

## 2021-05-04 RX ADMIN — MEMANTINE HYDROCHLORIDE 10 MILLIGRAM(S): 10 TABLET ORAL at 09:09

## 2021-05-04 RX ADMIN — Medication 1 APPLICATION(S): at 09:10

## 2021-05-04 RX ADMIN — RISPERIDONE 0.25 MILLIGRAM(S): 4 TABLET ORAL at 17:26

## 2021-05-04 RX ADMIN — MEMANTINE HYDROCHLORIDE 10 MILLIGRAM(S): 10 TABLET ORAL at 20:05

## 2021-05-04 RX ADMIN — RISPERIDONE 0.25 MILLIGRAM(S): 4 TABLET ORAL at 09:09

## 2021-05-04 NOTE — BH INPATIENT PSYCHIATRY PROGRESS NOTE - NSBHMETABOLIC_PSY_ALL_CORE_FT
BMI: BMI (kg/m2): 22.7 (04-17-21 @ 16:07)  HbA1c: A1C with Estimated Average Glucose Result: 5.5 % (03-23-21 @ 09:42)    Glucose:   BP: 90/60 (05-03-21 @ 12:05) (90/50 - 134/73)  Lipid Panel: Date/Time: 03-23-21 @ 09:42  Cholesterol, Serum: 172  Direct LDL: --  HDL Cholesterol, Serum: 60  Total Cholesterol/HDL Ration Measurement: --  Triglycerides, Serum: 60

## 2021-05-04 NOTE — BH INPATIENT PSYCHIATRY PROGRESS NOTE - NSBHFUPINTERVALHXFT_PSY_A_CORE
Patient seen for depression and behavioral disturbance. Chart reviewed and case discussed with nursing staff. Per staff, patient continues to perseverate about his food, appears depressed. On encounter, patient states he ate too quick and too fast and does not feel good. ECT once again discussed with patient and though he is hesitant, he is agreeing to the teratment. Patient denies active SI/ HI. No hallucinations reported. He remains med compliant.

## 2021-05-04 NOTE — BH INPATIENT PSYCHIATRY PROGRESS NOTE - NSBHCHARTREVIEWVS_PSY_A_CORE FT
Vital Signs Last 24 Hrs  T(C): 36.3 (04 May 2021 07:47), Max: 36.9 (03 May 2021 15:30)  T(F): 97.3 (04 May 2021 07:47), Max: 98.5 (03 May 2021 15:30)    05-04-21 @ 07:47   Sitting BP: 100/61 HR: 60  Site: upper right arm   Mode: electronic

## 2021-05-04 NOTE — BH INPATIENT PSYCHIATRY PROGRESS NOTE - NSBHASSESSSUMMFT_PSY_ALL_CORE
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  Recent neuropsych eval consistent with fronto-temporal dementia (FTD)  On evaluation, patient appears depressed, remains perseverative about his food/ GI symptoms, believes he is going to die, ECT once again discussed with patient and though hesitant, he is agreeable to it. Patient remains med compliant.   Plan: Will c/w Memantine, Mirtazapine, and Risperidone at current dose. ECT consult placed, input appreciated, will f/u recs.

## 2021-05-05 DIAGNOSIS — G31.09 OTHER FRONTOTEMPORAL NEUROCOGNITIVE DISORDER: ICD-10-CM

## 2021-05-05 PROCEDURE — 99232 SBSQ HOSP IP/OBS MODERATE 35: CPT

## 2021-05-05 RX ADMIN — RISPERIDONE 0.5 MILLIGRAM(S): 4 TABLET ORAL at 20:26

## 2021-05-05 RX ADMIN — MEMANTINE HYDROCHLORIDE 10 MILLIGRAM(S): 10 TABLET ORAL at 20:27

## 2021-05-05 RX ADMIN — Medication 1 APPLICATION(S): at 14:47

## 2021-05-05 RX ADMIN — Medication 1 MILLIGRAM(S): at 09:02

## 2021-05-05 RX ADMIN — FINASTERIDE 5 MILLIGRAM(S): 5 TABLET, FILM COATED ORAL at 09:03

## 2021-05-05 RX ADMIN — Medication 1000 UNIT(S): at 09:02

## 2021-05-05 RX ADMIN — AMLODIPINE BESYLATE 5 MILLIGRAM(S): 2.5 TABLET ORAL at 09:03

## 2021-05-05 RX ADMIN — PANTOPRAZOLE SODIUM 40 MILLIGRAM(S): 20 TABLET, DELAYED RELEASE ORAL at 09:03

## 2021-05-05 RX ADMIN — RISPERIDONE 0.25 MILLIGRAM(S): 4 TABLET ORAL at 17:16

## 2021-05-05 RX ADMIN — Medication 1 APPLICATION(S): at 10:24

## 2021-05-05 RX ADMIN — MIRTAZAPINE 22.5 MILLIGRAM(S): 45 TABLET, ORALLY DISINTEGRATING ORAL at 20:26

## 2021-05-05 RX ADMIN — RISPERIDONE 0.25 MILLIGRAM(S): 4 TABLET ORAL at 09:03

## 2021-05-05 RX ADMIN — Medication 1 APPLICATION(S): at 10:23

## 2021-05-05 RX ADMIN — Medication 1 APPLICATION(S): at 20:27

## 2021-05-05 RX ADMIN — MEMANTINE HYDROCHLORIDE 10 MILLIGRAM(S): 10 TABLET ORAL at 09:03

## 2021-05-05 NOTE — ECT CONSULT NOTE - NSECTMENTALSTATUSEXAM_PSY_ALL_CORE
Conscious, cooperative, alert.   No psychomotor agitation/retardation.   Good eye contact.   Speech: regular rate and rhythm,   Mood: "Depressed" Affect: appropriate, full range.   Thought Process: linear, goal directed   Thought Content: No Death wish, No Suicidal ideation/intent/plan, No homicidal ideation/intent/plan. No delusions. Somatic preoccupation present about his stomach  Perception: No hallucinations   Insight and Judgement: fair  Impulse Control: fair at this time.

## 2021-05-05 NOTE — BH INPATIENT PSYCHIATRY PROGRESS NOTE - NSBHMETABOLIC_PSY_ALL_CORE_FT
BMI: BMI (kg/m2): 22.7 (04-17-21 @ 16:07)  HbA1c: A1C with Estimated Average Glucose Result: 5.5 % (03-23-21 @ 09:42)  BP: 90/60 (05-03-21 @ 12:05) (90/50 - 90/60)  Lipid Panel: Date/Time: 03-23-21 @ 09:42  Cholesterol, Serum: 172  Direct LDL: --  HDL Cholesterol, Serum: 60  Total Cholesterol/HDL Ration Measurement: --  Triglycerides, Serum: 60

## 2021-05-05 NOTE — ECT CONSULT NOTE - NSECTASSESSRECOMM_PSY_ALL_CORE
A course of ECT is indicated for the treatment of Depression with psychotic features for this patient, who also has early signs of frontotemporal dementia, but is able to understand risk vs benefits of ECT.  Patient is willing to try ECT and family (his sister) is also in agreement with the plan.     Patient signed informed consent for Bifrontal ECT.

## 2021-05-05 NOTE — BH INPATIENT PSYCHIATRY PROGRESS NOTE - NSBHFUPINTERVALHXFT_PSY_A_CORE
Patient seen for depression and behavioral disturbance. Chart reviewed and case discussed with nursing staff. Per staff, patient continues to perseverate about his food, appears depressed. On encounter, patient perseverates about his food, ECT once again discussed with patient and sates that he is worried that it'll kill him, is refusing to start treatment, risk/ benefits discussed, pt encouraged to consider options and seems a little more amenable, will broach subject again this evening. Of note, patient was previously agreeable with ECT. Patient denies active SI/ HI. No hallucinations reported. He remains med compliant.

## 2021-05-05 NOTE — ECT CONSULT NOTE - OTHER PAST PSYCHIATRIC HISTORY (INCLUDE DETAILS REGARDING ONSET, COURSE OF ILLNESS, INPATIENT/OUTPATIENT TREATMENT)
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  Recent neuropsych eval consistent with fronto-temporal dementia (FTD). CT and MRI show microvascular changes, but essentially unremarkable.   Patient has strange perserverative behavior, keeps c/o about his food and GI symptoms, believes he is going to die, depressed mood. Sensitive to high doses of meds. 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    Patient was admitted on the inpatient unit and was found to have depression along with changes in cognition. Recent neuropsych eval consistent with fronto-temporal dementia (FTD). CT and MRI show microvascular changes, but essentially unremarkable. At baseline, patient is an extrovert person, interacting with other people. During his stay on the inpatient unit, he has been mostly observed to be quite, never smiling, not participating in the groups. He is excessively preoccupied with his stomach (has a h/o hiatal hernia) and bowels. He makes frequent burping sounds--attributing them to him "eating too fast or drinking too fast". He believes he is going to die, depressed mood. He has been sensitive to high doses of meds as reported by the inpatient team. Patient endorses depression and stress, but minimizes its intensity and is preoccupied with his stomach issues. He denies hallucinations or delusions. He spends most of the time of his day perseverating/preoccupied about his stomach. His sleep is decreased, appetite : decreased. Reports amotivation, helplessness, hopelessness. He denies death wish/suicidal ideation/intent/plan. He has been tried on various medications and has not shown sufficient response despite being in the hospital for 1.5 months.     Past Psychiatric history: h/o 2 previous psychiatric hospitalization. H/o Gi preoccupation in the hospitalization during 2019.

## 2021-05-05 NOTE — ECT CONSULT NOTE - DESCRIPTION
HTN (hypertension)  Vitamin D deficiency  GERD (gastroesophageal reflux disease)  Hypercholesteremia

## 2021-05-05 NOTE — BH INPATIENT PSYCHIATRY PROGRESS NOTE - NSBHCHARTREVIEWVS_PSY_A_CORE FT
Vital Signs Last 24 Hrs  T(C): 36.1 (05 May 2021 05:03), Max: 36.3 (04 May 2021 15:36)  T(F): 96.9 (05 May 2021 05:03), Max: 97.4 (04 May 2021 15:36)  RR: 18 (05 May 2021 05:03) (18 - 18)  Orthostatic VS  05-05-21 @ 05:03  Sitting BP: 152/89 HR: 64  Standing BP: 138/85 HR: 75  Site: upper right arm   Mode: electronic

## 2021-05-05 NOTE — BH INPATIENT PSYCHIATRY PROGRESS NOTE - NSBHASSESSSUMMFT_PSY_ALL_CORE
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  Recent neuropsych eval consistent with fronto-temporal dementia (FTD)  On evaluation, patient appears depressed, remains perseverative about his food/ GI symptoms, ECT once again discussed with patient and though he was agreeable to it earlier, he is refusing now, believes it'll kill him, risks/ benefits discussed and he wants to think over it. Patient remains med compliant.   Plan: Will c/w Memantine, Mirtazapine, and Risperidone at current dose. Will consider increase in Mirtazapine if pt continues to refuse ECT.

## 2021-05-06 LAB — SARS-COV-2 RNA SPEC QL NAA+PROBE: SIGNIFICANT CHANGE UP

## 2021-05-06 PROCEDURE — 99232 SBSQ HOSP IP/OBS MODERATE 35: CPT

## 2021-05-06 RX ADMIN — MEMANTINE HYDROCHLORIDE 10 MILLIGRAM(S): 10 TABLET ORAL at 20:39

## 2021-05-06 RX ADMIN — AMLODIPINE BESYLATE 5 MILLIGRAM(S): 2.5 TABLET ORAL at 09:06

## 2021-05-06 RX ADMIN — Medication 1 APPLICATION(S): at 20:39

## 2021-05-06 RX ADMIN — MEMANTINE HYDROCHLORIDE 10 MILLIGRAM(S): 10 TABLET ORAL at 09:06

## 2021-05-06 RX ADMIN — Medication 1 MILLIGRAM(S): at 09:06

## 2021-05-06 RX ADMIN — MIRTAZAPINE 22.5 MILLIGRAM(S): 45 TABLET, ORALLY DISINTEGRATING ORAL at 20:40

## 2021-05-06 RX ADMIN — Medication 1000 UNIT(S): at 09:06

## 2021-05-06 RX ADMIN — Medication 1 APPLICATION(S): at 11:05

## 2021-05-06 RX ADMIN — RISPERIDONE 0.5 MILLIGRAM(S): 4 TABLET ORAL at 20:39

## 2021-05-06 RX ADMIN — Medication 1 APPLICATION(S): at 16:26

## 2021-05-06 RX ADMIN — FINASTERIDE 5 MILLIGRAM(S): 5 TABLET, FILM COATED ORAL at 09:05

## 2021-05-06 RX ADMIN — RISPERIDONE 0.25 MILLIGRAM(S): 4 TABLET ORAL at 09:05

## 2021-05-06 RX ADMIN — RISPERIDONE 0.25 MILLIGRAM(S): 4 TABLET ORAL at 18:11

## 2021-05-06 RX ADMIN — PANTOPRAZOLE SODIUM 40 MILLIGRAM(S): 20 TABLET, DELAYED RELEASE ORAL at 09:06

## 2021-05-06 RX ADMIN — Medication 1 APPLICATION(S): at 20:40

## 2021-05-06 NOTE — BH INPATIENT PSYCHIATRY PROGRESS NOTE - NSBHASSESSSUMMFT_PSY_ALL_CORE
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  Recent neuropsych eval consistent with fronto-temporal dementia (FTD)  On evaluation, patient appears depressed, remains perseverative about his food/ GI symptoms. Patient signed consent for ECT last evening.  Plan: Will c/w Memantine, Mirtazapine, and Risperidone at current dose. Patient scheduled to receive his 1st ECT tomorrow 05/07. Will repeat COVID-PCR pre-procedure.

## 2021-05-06 NOTE — BH INPATIENT PSYCHIATRY PROGRESS NOTE - MSE UNSTRUCTURED FT
The patient appears stated age, dressed casual clothes. Cooperative, but gives limited answers to most questions. No PMA/ PMR noted. The patient's speech is fluent but soft. Reported mood "ok" with constricted affect. Thought process grossly preservative. Thought content: somatically preoccupied, denies current SI/HI. No perceptual disturbance appreciated. Insight and judgment are limited. Impulse control intact at the time of exam. Alert and oriented X4

## 2021-05-06 NOTE — BH INPATIENT PSYCHIATRY PROGRESS NOTE - NSBHFUPINTERVALHXFT_PSY_A_CORE
Patient seen for depression and behavioral disturbance. Chart reviewed and case discussed with nursing staff. Per staff, patient continues to perseverate about his food, appears depressed. Patient signed consent for ECT last evening. On encounter, patient with very constricted affect, minimal responses to questions, perseverates about his food, inquires when he'd have ECT tomorrow, agrees for a COVID-PCR today. Patient denies active SI/ HI. No hallucinations reported. He remains med compliant.

## 2021-05-06 NOTE — BH INPATIENT PSYCHIATRY PROGRESS NOTE - NSBHMETABOLIC_PSY_ALL_CORE_FT
BMI: BMI (kg/m2): 22.7 (04-17-21 @ 16:07)  HbA1c: A1C with Estimated Average Glucose Result: 5.5 % (03-23-21 @ 09:42)  BP: 90/60 (05-03-21 @ 12:05) (90/60 - 90/60)  Lipid Panel: Date/Time: 03-23-21 @ 09:42  Cholesterol, Serum: 172  HDL Cholesterol, Serum: 60  Triglycerides, Serum: 60

## 2021-05-06 NOTE — BH INPATIENT PSYCHIATRY PROGRESS NOTE - NSBHCHARTREVIEWVS_PSY_A_CORE FT
Vital Signs Last 24 Hrs  T(C): 36.1 (06 May 2021 05:31), Max: 36.2 (05 May 2021 15:37)  T(F): 96.9 (06 May 2021 05:31), Max: 97.1 (05 May 2021 15:37)  RR: 18 (06 May 2021 05:31) (18 - 18)  Orthostatic VS  05-06-21 @ 05:31  Lying BP: 153/76 HR: 59   Site: upper left arm   Mode: electronic

## 2021-05-07 PROCEDURE — 90870 ELECTROCONVULSIVE THERAPY: CPT

## 2021-05-07 PROCEDURE — 99232 SBSQ HOSP IP/OBS MODERATE 35: CPT | Mod: 25

## 2021-05-07 RX ORDER — MIDAZOLAM HYDROCHLORIDE 1 MG/ML
2 INJECTION, SOLUTION INTRAMUSCULAR; INTRAVENOUS ONCE
Refills: 0 | Status: DISCONTINUED | OUTPATIENT
Start: 2021-05-07 | End: 2021-05-07

## 2021-05-07 RX ADMIN — RISPERIDONE 0.5 MILLIGRAM(S): 4 TABLET ORAL at 20:52

## 2021-05-07 RX ADMIN — MIRTAZAPINE 22.5 MILLIGRAM(S): 45 TABLET, ORALLY DISINTEGRATING ORAL at 20:52

## 2021-05-07 RX ADMIN — Medication 1 APPLICATION(S): at 15:12

## 2021-05-07 RX ADMIN — MEMANTINE HYDROCHLORIDE 10 MILLIGRAM(S): 10 TABLET ORAL at 20:51

## 2021-05-07 RX ADMIN — Medication 1 APPLICATION(S): at 20:51

## 2021-05-07 RX ADMIN — Medication 30 MILLILITER(S): at 18:28

## 2021-05-07 RX ADMIN — RISPERIDONE 0.25 MILLIGRAM(S): 4 TABLET ORAL at 18:19

## 2021-05-07 NOTE — BH INPATIENT PSYCHIATRY PROGRESS NOTE - NSBHASSESSSUMMFT_PSY_ALL_CORE
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  Recent neuropsych eval consistent with fronto-temporal dementia (FTD)  On evaluation, patient reports mood as "ok", waiting for his ECT this morning. Per staff, patient has been defecating in the trash can in his room, but patient did not wish to talk about it when asked this morning.  Plan: Will c/w Memantine, Mirtazapine, and Risperidone at current dose. Patient scheduled to receive his 1st ECT today.

## 2021-05-07 NOTE — BH INPATIENT PSYCHIATRY PROGRESS NOTE - NSBHCHARTREVIEWVS_PSY_A_CORE FT
Vital Signs Last 24 Hrs  T(C): 36.6 (07 May 2021 05:23), Max: 36.6 (07 May 2021 05:23)  T(F): 97.8 (07 May 2021 05:23), Max: 97.8 (07 May 2021 05:23)  RR: 18 (07 May 2021 05:23) (18 - 18)  Orthostatic VS  05-07-21 @ 05:23  Sitting BP: 117/78 HR: 69  Standing BP: 115/83 HR: 82  Site: upper right arm   Mode: electronic

## 2021-05-07 NOTE — BH INPATIENT PSYCHIATRY PROGRESS NOTE - NSBHFUPINTERVALHXFT_PSY_A_CORE
Patient seen for depression and behavioral disturbance. Chart reviewed and case discussed with nursing staff. Per staff, patient has been defecating in the trash can in his room. Patient seen prior to his 1st ECT treatment this morning. On encounter, patient seated in a chair in the common area, states he is "ok", minimal responses to questions, denies any new concerns, denies dizziness. Does not respond when asked about BM. Patient denies active SI/ HI. No hallucinations reported. He remains med compliant.

## 2021-05-08 RX ADMIN — Medication 1 APPLICATION(S): at 08:04

## 2021-05-08 RX ADMIN — Medication 1000 UNIT(S): at 08:03

## 2021-05-08 RX ADMIN — MEMANTINE HYDROCHLORIDE 10 MILLIGRAM(S): 10 TABLET ORAL at 20:11

## 2021-05-08 RX ADMIN — PANTOPRAZOLE SODIUM 40 MILLIGRAM(S): 20 TABLET, DELAYED RELEASE ORAL at 08:04

## 2021-05-08 RX ADMIN — RISPERIDONE 0.25 MILLIGRAM(S): 4 TABLET ORAL at 18:01

## 2021-05-08 RX ADMIN — Medication 30 MILLILITER(S): at 09:16

## 2021-05-08 RX ADMIN — Medication 1 APPLICATION(S): at 20:12

## 2021-05-08 RX ADMIN — MIRTAZAPINE 22.5 MILLIGRAM(S): 45 TABLET, ORALLY DISINTEGRATING ORAL at 20:11

## 2021-05-08 RX ADMIN — Medication 1 MILLIGRAM(S): at 08:03

## 2021-05-08 RX ADMIN — RISPERIDONE 0.25 MILLIGRAM(S): 4 TABLET ORAL at 08:08

## 2021-05-08 RX ADMIN — FINASTERIDE 5 MILLIGRAM(S): 5 TABLET, FILM COATED ORAL at 08:03

## 2021-05-08 RX ADMIN — AMLODIPINE BESYLATE 5 MILLIGRAM(S): 2.5 TABLET ORAL at 08:04

## 2021-05-08 RX ADMIN — MEMANTINE HYDROCHLORIDE 10 MILLIGRAM(S): 10 TABLET ORAL at 08:03

## 2021-05-08 RX ADMIN — RISPERIDONE 0.5 MILLIGRAM(S): 4 TABLET ORAL at 20:11

## 2021-05-09 RX ADMIN — AMLODIPINE BESYLATE 5 MILLIGRAM(S): 2.5 TABLET ORAL at 08:44

## 2021-05-09 RX ADMIN — Medication 1 MILLIGRAM(S): at 08:44

## 2021-05-09 RX ADMIN — Medication 1 APPLICATION(S): at 08:45

## 2021-05-09 RX ADMIN — RISPERIDONE 0.25 MILLIGRAM(S): 4 TABLET ORAL at 08:44

## 2021-05-09 RX ADMIN — PANTOPRAZOLE SODIUM 40 MILLIGRAM(S): 20 TABLET, DELAYED RELEASE ORAL at 08:45

## 2021-05-09 RX ADMIN — Medication 1 APPLICATION(S): at 20:30

## 2021-05-09 RX ADMIN — RISPERIDONE 0.25 MILLIGRAM(S): 4 TABLET ORAL at 17:56

## 2021-05-09 RX ADMIN — MEMANTINE HYDROCHLORIDE 10 MILLIGRAM(S): 10 TABLET ORAL at 08:44

## 2021-05-09 RX ADMIN — RISPERIDONE 0.5 MILLIGRAM(S): 4 TABLET ORAL at 20:30

## 2021-05-09 RX ADMIN — FINASTERIDE 5 MILLIGRAM(S): 5 TABLET, FILM COATED ORAL at 08:43

## 2021-05-09 RX ADMIN — MEMANTINE HYDROCHLORIDE 10 MILLIGRAM(S): 10 TABLET ORAL at 20:30

## 2021-05-09 RX ADMIN — MIRTAZAPINE 22.5 MILLIGRAM(S): 45 TABLET, ORALLY DISINTEGRATING ORAL at 20:30

## 2021-05-09 RX ADMIN — Medication 1000 UNIT(S): at 08:44

## 2021-05-10 PROCEDURE — 90870 ELECTROCONVULSIVE THERAPY: CPT

## 2021-05-10 PROCEDURE — 99232 SBSQ HOSP IP/OBS MODERATE 35: CPT | Mod: 25

## 2021-05-10 RX ADMIN — MIRTAZAPINE 22.5 MILLIGRAM(S): 45 TABLET, ORALLY DISINTEGRATING ORAL at 20:15

## 2021-05-10 RX ADMIN — Medication 1 APPLICATION(S): at 13:29

## 2021-05-10 RX ADMIN — RISPERIDONE 0.25 MILLIGRAM(S): 4 TABLET ORAL at 17:35

## 2021-05-10 RX ADMIN — FINASTERIDE 5 MILLIGRAM(S): 5 TABLET, FILM COATED ORAL at 13:29

## 2021-05-10 RX ADMIN — Medication 30 MILLILITER(S): at 18:45

## 2021-05-10 RX ADMIN — Medication 1000 UNIT(S): at 13:29

## 2021-05-10 RX ADMIN — AMLODIPINE BESYLATE 5 MILLIGRAM(S): 2.5 TABLET ORAL at 13:28

## 2021-05-10 RX ADMIN — Medication 1 APPLICATION(S): at 20:16

## 2021-05-10 RX ADMIN — Medication 1 MILLIGRAM(S): at 13:30

## 2021-05-10 RX ADMIN — MEMANTINE HYDROCHLORIDE 10 MILLIGRAM(S): 10 TABLET ORAL at 20:15

## 2021-05-10 RX ADMIN — RISPERIDONE 0.5 MILLIGRAM(S): 4 TABLET ORAL at 20:15

## 2021-05-10 RX ADMIN — MEMANTINE HYDROCHLORIDE 10 MILLIGRAM(S): 10 TABLET ORAL at 13:30

## 2021-05-10 NOTE — BH INPATIENT PSYCHIATRY PROGRESS NOTE - NSBHASSESSSUMMFT_PSY_ALL_CORE
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  Recent neuropsych eval consistent with fronto-temporal dementia (FTD)  On evaluation, patient reports mood as "tired", waiting for his ECT this morning, states he is hungry.    Plan: Will c/w Memantine, Mirtazapine, and Risperidone at current dose. Patient scheduled to receive 2nd ECT today.

## 2021-05-10 NOTE — ECT PRE-PROCEDURE CHECKLIST - 1.
Pt was complaining of 10/10 stomach pain, as per RN on unit pt has complaint of stomach pains daily, MD Dr Rodriguez and Dr corona assessed pt and okay to treat

## 2021-05-10 NOTE — BH INPATIENT PSYCHIATRY PROGRESS NOTE - NSBHFUPINTERVALHXFT_PSY_A_CORE
Patient seen for depression and behavioral disturbance. Chart reviewed and case discussed with nursing staff. Per staff, No significant event over the weekend. Patient seen prior to his ECT this morning. On encounter, patient seated in a chair in the common area, states he is "tired", states he is hungry, denies any new concerns, denies dizziness. Patient denies active SI/ HI. No hallucinations reported. He remains med compliant. Patient has been moving bowels regularly.

## 2021-05-10 NOTE — BH INPATIENT PSYCHIATRY PROGRESS NOTE - NSBHMETABOLIC_PSY_ALL_CORE_FT
BMI: BMI (kg/m2): 26.8 (05-08-21 @ 15:55)  HbA1c: A1C with Estimated Average Glucose Result: 5.5 % (03-23-21 @ 09:42)  BP: 163/95 (05-07-21 @ 12:40) (149/52 - 163/95)  Lipid Panel: Date/Time: 03-23-21 @ 09:42  Cholesterol, Serum: 172  HDL Cholesterol, Serum: 60  Triglycerides, Serum: 60

## 2021-05-10 NOTE — BH INPATIENT PSYCHIATRY PROGRESS NOTE - MSE UNSTRUCTURED FT
The patient appears stated age, dressed in hospital gowns. Cooperative, but gives limited answers to most questions. No PMA/ PMR noted. The patient's speech is fluent but soft. Reported mood "tired" with constricted affect. Thought process grossly preservative. Thought content: somatically preoccupied (but relatively less),  denies current SI/HI. No perceptual disturbance appreciated. Insight and judgment are limited. Impulse control intact at the time of exam. Alert and oriented X4

## 2021-05-10 NOTE — BH INPATIENT PSYCHIATRY PROGRESS NOTE - NSBHCHARTREVIEWVS_PSY_A_CORE FT
Vital Signs Last 24 Hrs  T(C): 36.4 (10 May 2021 05:52), Max: 36.4 (09 May 2021 15:30)  T(F): 97.5 (10 May 2021 05:52), Max: 97.5 (09 May 2021 15:30)  RR: 18 (10 May 2021 05:52) (18 - 18)  Orthostatic VS  05-10-21 @ 05:52  Lying BP: 151/73 HR: 58   Sitting BP: 141/84 HR: 62

## 2021-05-11 PROCEDURE — 99232 SBSQ HOSP IP/OBS MODERATE 35: CPT

## 2021-05-11 RX ORDER — AMLODIPINE BESYLATE 2.5 MG/1
5 TABLET ORAL DAILY
Refills: 0 | Status: DISCONTINUED | OUTPATIENT
Start: 2021-05-11 | End: 2021-05-24

## 2021-05-11 RX ADMIN — PANTOPRAZOLE SODIUM 40 MILLIGRAM(S): 20 TABLET, DELAYED RELEASE ORAL at 08:55

## 2021-05-11 RX ADMIN — Medication 30 MILLILITER(S): at 12:01

## 2021-05-11 RX ADMIN — MIRTAZAPINE 22.5 MILLIGRAM(S): 45 TABLET, ORALLY DISINTEGRATING ORAL at 20:39

## 2021-05-11 RX ADMIN — RISPERIDONE 0.25 MILLIGRAM(S): 4 TABLET ORAL at 08:55

## 2021-05-11 RX ADMIN — AMLODIPINE BESYLATE 5 MILLIGRAM(S): 2.5 TABLET ORAL at 08:56

## 2021-05-11 RX ADMIN — RISPERIDONE 0.5 MILLIGRAM(S): 4 TABLET ORAL at 20:38

## 2021-05-11 RX ADMIN — RISPERIDONE 0.25 MILLIGRAM(S): 4 TABLET ORAL at 17:14

## 2021-05-11 RX ADMIN — MEMANTINE HYDROCHLORIDE 10 MILLIGRAM(S): 10 TABLET ORAL at 20:39

## 2021-05-11 RX ADMIN — Medication 1 APPLICATION(S): at 20:39

## 2021-05-11 RX ADMIN — Medication 30 MILLILITER(S): at 18:05

## 2021-05-11 RX ADMIN — FINASTERIDE 5 MILLIGRAM(S): 5 TABLET, FILM COATED ORAL at 08:55

## 2021-05-11 RX ADMIN — Medication 1000 UNIT(S): at 08:55

## 2021-05-11 RX ADMIN — MEMANTINE HYDROCHLORIDE 10 MILLIGRAM(S): 10 TABLET ORAL at 08:55

## 2021-05-11 RX ADMIN — Medication 1 MILLIGRAM(S): at 08:55

## 2021-05-11 NOTE — BH INPATIENT PSYCHIATRY PROGRESS NOTE - NSBHMETABOLIC_PSY_ALL_CORE_FT
BMI: BMI (kg/m2): 26.8 (05-08-21 @ 15:55)  HbA1c: A1C with Estimated Average Glucose Result: 5.5 % (03-23-21 @ 09:42)   BP: 182/80 (05-10-21 @ 12:20) (172/88 - 189/94)  Lipid Panel: Date/Time: 03-23-21 @ 09:42  Cholesterol, Serum: 172  HDL Cholesterol, Serum: 60  Triglycerides, Serum: 60

## 2021-05-11 NOTE — BH INPATIENT PSYCHIATRY PROGRESS NOTE - NSBHASSESSSUMMFT_PSY_ALL_CORE
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  Recent neuropsych eval consistent with fronto-temporal dementia (FTD)  On evaluation, patient reports mood as "ok", denies concern, still focused on food.    Plan: Will c/w Memantine, Mirtazapine, and Risperidone at current dose. Patient scheduled to receive next ECT tomorrow 05/12.

## 2021-05-11 NOTE — BH INPATIENT PSYCHIATRY PROGRESS NOTE - NSBHCHARTREVIEWVS_PSY_A_CORE FT
Vital Signs Last 24 Hrs  T(C): 36 (11 May 2021 05:43), Max: 36.8 (10 May 2021 15:29)  T(F): 96.8 (11 May 2021 05:43), Max: 98.3 (10 May 2021 15:29)    05-11-21 @ 05:43  Lying BP: 109/52 HR: 57

## 2021-05-11 NOTE — BH INPATIENT PSYCHIATRY PROGRESS NOTE - MSE UNSTRUCTURED FT
The patient appears stated age, dressed in hospital gowns. Cooperative, but gives limited answers to most questions, but talking a little more today. No PMA/ PMR noted. The patient's speech is fluent but soft. Reported mood "ok" with constricted affect. Thought process grossly preservative. Thought content: somatically preoccupied (but relatively less), denies current SI/HI. No perceptual disturbance appreciated. Insight and judgment are limited. Impulse control intact at the time of exam. Alert and oriented X4

## 2021-05-11 NOTE — BH INPATIENT PSYCHIATRY PROGRESS NOTE - NSBHFUPINTERVALHXFT_PSY_A_CORE
Patient seen for depression and behavioral disturbance. Chart reviewed and case discussed with nursing staff. Per staff, patient still focused on food but more talkative. On encounter today, patient seated in a chair in the common area eating breakfast, reports mood as "ok", states his brother came to see him last evening. Patient denies any new concerns including pian/ dizziness. He denies active SI/ HI. No hallucinations reported. He remains med compliant. Patient has been moving bowels regularly.

## 2021-05-12 PROCEDURE — 99232 SBSQ HOSP IP/OBS MODERATE 35: CPT | Mod: 25

## 2021-05-12 PROCEDURE — 90870 ELECTROCONVULSIVE THERAPY: CPT

## 2021-05-12 RX ADMIN — Medication 1 APPLICATION(S): at 20:43

## 2021-05-12 RX ADMIN — MEMANTINE HYDROCHLORIDE 10 MILLIGRAM(S): 10 TABLET ORAL at 20:42

## 2021-05-12 RX ADMIN — RISPERIDONE 0.5 MILLIGRAM(S): 4 TABLET ORAL at 20:42

## 2021-05-12 RX ADMIN — RISPERIDONE 0.25 MILLIGRAM(S): 4 TABLET ORAL at 17:27

## 2021-05-12 RX ADMIN — MIRTAZAPINE 22.5 MILLIGRAM(S): 45 TABLET, ORALLY DISINTEGRATING ORAL at 20:43

## 2021-05-12 NOTE — BH INPATIENT PSYCHIATRY PROGRESS NOTE - NSBHCHARTREVIEWVS_PSY_A_CORE FT
Vital Signs Last 24 Hrs  T(C): 36.4 (12 May 2021 05:57), Max: 36.6 (11 May 2021 15:23)  T(F): 97.5 (12 May 2021 05:57), Max: 97.8 (11 May 2021 15:23)  Orthostatic VS  05-12-21 @ 05:57  Lying BP: 153/68 HR: 51   Sitting BP: 137/93 HR: 62

## 2021-05-12 NOTE — BH INPATIENT PSYCHIATRY PROGRESS NOTE - NSBHFUPINTERVALHXFT_PSY_A_CORE
Patient seen for depression and behavioral disturbance. Chart reviewed and case discussed with nursing staff. Per staff, patient still focused on food but more talkative. Patient seen prior to ECT today. On encounter, patient seated in a chair in the day room, reports mood as "ok", denies any concerns including pain/ dizziness. He denies active SI/ HI. No hallucinations reported. He remains med compliant. Patient has been moving bowels regularly.

## 2021-05-12 NOTE — BH INPATIENT PSYCHIATRY PROGRESS NOTE - NSBHASSESSSUMMFT_PSY_ALL_CORE
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  Recent neuropsych eval consistent with fronto-temporal dementia (FTD)  On evaluation, patient reports mood as "ok", denies concern, still focused on food but less perseverative about GI symptoms.   Plan: Will c/w Memantine, Mirtazapine, and Risperidone at current dose. Patient scheduled to receive next ECT this morning, 05/12.

## 2021-05-12 NOTE — BH INPATIENT PSYCHIATRY PROGRESS NOTE - MSE UNSTRUCTURED FT
The patient appears stated age, dressed in hospital gowns. Cooperative, but gives limited answers to most questions, but talking a little more today. No PMA/ PMR noted. The patient's speech is fluent but soft. Reported mood "ok" with constricted affect. Thought process grossly preservative. Thought content: less somatically preoccupied, denies current SI/HI. No perceptual disturbance appreciated. Insight and judgment are limited. Impulse control intact at the time of exam. Alert and oriented X4

## 2021-05-13 PROCEDURE — 99232 SBSQ HOSP IP/OBS MODERATE 35: CPT

## 2021-05-13 RX ADMIN — MEMANTINE HYDROCHLORIDE 10 MILLIGRAM(S): 10 TABLET ORAL at 21:15

## 2021-05-13 RX ADMIN — Medication 30 MILLILITER(S): at 09:20

## 2021-05-13 RX ADMIN — RISPERIDONE 0.5 MILLIGRAM(S): 4 TABLET ORAL at 21:15

## 2021-05-13 RX ADMIN — Medication 1 APPLICATION(S): at 09:19

## 2021-05-13 RX ADMIN — Medication 1000 UNIT(S): at 09:19

## 2021-05-13 RX ADMIN — FINASTERIDE 5 MILLIGRAM(S): 5 TABLET, FILM COATED ORAL at 09:24

## 2021-05-13 RX ADMIN — Medication 1 MILLIGRAM(S): at 09:19

## 2021-05-13 RX ADMIN — RISPERIDONE 0.25 MILLIGRAM(S): 4 TABLET ORAL at 09:19

## 2021-05-13 RX ADMIN — AMLODIPINE BESYLATE 5 MILLIGRAM(S): 2.5 TABLET ORAL at 09:20

## 2021-05-13 RX ADMIN — MEMANTINE HYDROCHLORIDE 10 MILLIGRAM(S): 10 TABLET ORAL at 09:19

## 2021-05-13 RX ADMIN — RISPERIDONE 0.25 MILLIGRAM(S): 4 TABLET ORAL at 17:37

## 2021-05-13 RX ADMIN — MIRTAZAPINE 22.5 MILLIGRAM(S): 45 TABLET, ORALLY DISINTEGRATING ORAL at 21:15

## 2021-05-13 RX ADMIN — PANTOPRAZOLE SODIUM 40 MILLIGRAM(S): 20 TABLET, DELAYED RELEASE ORAL at 08:11

## 2021-05-13 NOTE — BH INPATIENT PSYCHIATRY PROGRESS NOTE - NSBHMETABOLIC_PSY_ALL_CORE_FT
BMI: BMI (kg/m2): 26.8 (05-08-21 @ 15:55)  HbA1c: A1C with Estimated Average Glucose Result: 5.5 % (03-23-21 @ 09:42)  BP: 158/87 (05-12-21 @ 12:40) (153/83 - 189/94)  Lipid Panel: Date/Time: 03-23-21 @ 09:42  Cholesterol, Serum: 172  HDL Cholesterol, Serum: 60  Triglycerides, Serum: 60

## 2021-05-13 NOTE — BH INPATIENT PSYCHIATRY PROGRESS NOTE - NSBHCHARTREVIEWVS_PSY_A_CORE FT
Vital Signs Last 24 Hrs  T(C): 36.1 (13 May 2021 05:32), Max: 36.6 (12 May 2021 11:33)  T(F): 96.9 (13 May 2021 05:32), Max: 97.8 (12 May 2021 11:33)  Orthostatic VS  05-13-21 @ 05:32  Lying BP: 168/69 HR: 57   Sitting BP: 156/80 HR: 55  Site: upper left arm   Mode: electronic

## 2021-05-13 NOTE — BH INPATIENT PSYCHIATRY PROGRESS NOTE - NSBHFUPINTERVALHXFT_PSY_A_CORE
Patient seen for depression and behavioral disturbance. Chart reviewed and case discussed with nursing staff. Per staff, patient still focused on food but more talkative. No behavioral issues. No PRNs. On encounter, patient seated in a chair in the day room eating his breakfast, reports mood as "ok", denies any concerns including pain/ dizziness; denies active SI/ HI. No hallucinations reported. He remains med compliant. Patient has been moving bowels regularly.

## 2021-05-13 NOTE — BH INPATIENT PSYCHIATRY PROGRESS NOTE - NSBHASSESSSUMMFT_PSY_ALL_CORE
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  Recent neuropsych eval consistent with fronto-temporal dementia (FTD)  On evaluation, patient reports mood as "ok", still focused on food but less perseverative about GI symptoms.   Plan: Will c/w Memantine, Mirtazapine, and Risperidone at current dose. Patient scheduled to receive next ECT tomorrow, 05/14.

## 2021-05-14 PROCEDURE — 99232 SBSQ HOSP IP/OBS MODERATE 35: CPT | Mod: 25

## 2021-05-14 PROCEDURE — 90870 ELECTROCONVULSIVE THERAPY: CPT

## 2021-05-14 RX ADMIN — MIRTAZAPINE 22.5 MILLIGRAM(S): 45 TABLET, ORALLY DISINTEGRATING ORAL at 20:51

## 2021-05-14 RX ADMIN — AMLODIPINE BESYLATE 5 MILLIGRAM(S): 2.5 TABLET ORAL at 12:35

## 2021-05-14 RX ADMIN — MEMANTINE HYDROCHLORIDE 10 MILLIGRAM(S): 10 TABLET ORAL at 12:35

## 2021-05-14 RX ADMIN — FINASTERIDE 5 MILLIGRAM(S): 5 TABLET, FILM COATED ORAL at 12:34

## 2021-05-14 RX ADMIN — MEMANTINE HYDROCHLORIDE 10 MILLIGRAM(S): 10 TABLET ORAL at 20:50

## 2021-05-14 RX ADMIN — RISPERIDONE 0.5 MILLIGRAM(S): 4 TABLET ORAL at 20:50

## 2021-05-14 RX ADMIN — Medication 1 APPLICATION(S): at 22:01

## 2021-05-14 RX ADMIN — Medication 1000 UNIT(S): at 12:36

## 2021-05-14 RX ADMIN — RISPERIDONE 0.25 MILLIGRAM(S): 4 TABLET ORAL at 12:34

## 2021-05-14 RX ADMIN — RISPERIDONE 0.25 MILLIGRAM(S): 4 TABLET ORAL at 17:52

## 2021-05-14 RX ADMIN — PANTOPRAZOLE SODIUM 40 MILLIGRAM(S): 20 TABLET, DELAYED RELEASE ORAL at 12:36

## 2021-05-14 RX ADMIN — Medication 1 MILLIGRAM(S): at 12:35

## 2021-05-14 NOTE — BH INPATIENT PSYCHIATRY PROGRESS NOTE - NSBHMETABOLIC_PSY_ALL_CORE_FT
BMI: BMI (kg/m2): 26.8 (05-08-21 @ 15:55)  HbA1c: A1C with Estimated Average Glucose Result: 5.5 % (03-23-21 @ 09:42)    Glucose:   BP: 158/87 (05-12-21 @ 12:40) (153/83 - 172/74)  Lipid Panel: Date/Time: 03-23-21 @ 09:42  Cholesterol, Serum: 172  Direct LDL: --  HDL Cholesterol, Serum: 60  Total Cholesterol/HDL Ration Measurement: --  Triglycerides, Serum: 60

## 2021-05-14 NOTE — BH INPATIENT PSYCHIATRY PROGRESS NOTE - NSBHASSESSSUMMFT_PSY_ALL_CORE
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  Recent neuropsych eval consistent with fronto-temporal dementia (FTD)  On evaluation, patient reports mood as "ok", still focused on food but less perseverative about GI symptoms.   Plan: Will c/w Memantine, Mirtazapine, and Risperidone at current dose. Patient scheduled to receive next ECT tomorrow, 05/14.    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  Recent neuropsych eval consistent with fronto-temporal dementia (FTD)  On evaluation, patient reports mood as "ok", still focused on food and talks about dinner from last night, but is less perseverative about GI symptoms.   Plan: Will c/w Memantine, Mirtazapine, and Risperidone at current dose. Patient scheduled to receive next ECT this morning, 05/14.

## 2021-05-14 NOTE — BH INPATIENT PSYCHIATRY PROGRESS NOTE - NSBHFUPINTERVALHXFT_PSY_A_CORE
Patient seen for depression and behavioral disturbance. Chart reviewed and case discussed with nursing staff. Per staff, patient still focused on food but more talkative. No behavioral issues. No PRNs. On encounter, patient seated in a chair in the day room eating his breakfast, reports mood as "ok", denies any concerns including pain/ dizziness; denies active SI/ HI. No hallucinations reported. He remains med compliant. Patient has been moving bowels regularly.    Patient seen for depression and behavioral disturbance. Chart reviewed and case discussed with nursing staff. Per staff, patient still focused on food and is disorganized at times. No behavioral issues. No PRNs. Patient seen prior to ECT today. On encounter, patient lying in his room, reports mood as "ok", denies any concerns including pain/ dizziness; denies SI/ HI. No hallucinations reported. He remains med compliant. Patient has been moving bowels regularly. Patient has been speaking with his siblings regularly over the phone.

## 2021-05-14 NOTE — BH INPATIENT PSYCHIATRY PROGRESS NOTE - NSBHCHARTREVIEWVS_PSY_A_CORE FT
Vital Signs Last 24 Hrs  T(C): 36.5 (14 May 2021 06:07), Max: 36.6 (13 May 2021 15:57)  T(F): 97.7 (14 May 2021 06:07), Max: 97.8 (13 May 2021 15:57)  HR: --  BP: --  BP(mean): --  RR: 20 (14 May 2021 06:07) (20 - 20)  SpO2: -- Vital Signs Last 24 Hrs  T(C): 36.5 (14 May 2021 06:07), Max: 36.6 (13 May 2021 15:57)  T(F): 97.7 (14 May 2021 06:07), Max: 97.8 (13 May 2021 15:57)  RR: 20 (14 May 2021 06:07) (20 - 20)  Orthostatic VS  05-14-21 @ 06:07  Lying BP: 128/65 HR: 54   Sitting BP: 119/73 HR: 65

## 2021-05-15 RX ADMIN — Medication 1 MILLIGRAM(S): at 09:22

## 2021-05-15 RX ADMIN — Medication 1000 UNIT(S): at 10:45

## 2021-05-15 RX ADMIN — MEMANTINE HYDROCHLORIDE 10 MILLIGRAM(S): 10 TABLET ORAL at 20:32

## 2021-05-15 RX ADMIN — MEMANTINE HYDROCHLORIDE 10 MILLIGRAM(S): 10 TABLET ORAL at 09:22

## 2021-05-15 RX ADMIN — RISPERIDONE 0.25 MILLIGRAM(S): 4 TABLET ORAL at 17:41

## 2021-05-15 RX ADMIN — MIRTAZAPINE 22.5 MILLIGRAM(S): 45 TABLET, ORALLY DISINTEGRATING ORAL at 20:32

## 2021-05-15 RX ADMIN — Medication 1 APPLICATION(S): at 21:58

## 2021-05-15 RX ADMIN — PANTOPRAZOLE SODIUM 40 MILLIGRAM(S): 20 TABLET, DELAYED RELEASE ORAL at 09:21

## 2021-05-15 RX ADMIN — AMLODIPINE BESYLATE 5 MILLIGRAM(S): 2.5 TABLET ORAL at 09:22

## 2021-05-15 RX ADMIN — RISPERIDONE 0.5 MILLIGRAM(S): 4 TABLET ORAL at 20:32

## 2021-05-15 RX ADMIN — RISPERIDONE 0.25 MILLIGRAM(S): 4 TABLET ORAL at 09:22

## 2021-05-15 RX ADMIN — FINASTERIDE 5 MILLIGRAM(S): 5 TABLET, FILM COATED ORAL at 10:45

## 2021-05-16 RX ADMIN — MEMANTINE HYDROCHLORIDE 10 MILLIGRAM(S): 10 TABLET ORAL at 09:35

## 2021-05-16 RX ADMIN — RISPERIDONE 0.5 MILLIGRAM(S): 4 TABLET ORAL at 20:29

## 2021-05-16 RX ADMIN — Medication 1 APPLICATION(S): at 21:35

## 2021-05-16 RX ADMIN — MEMANTINE HYDROCHLORIDE 10 MILLIGRAM(S): 10 TABLET ORAL at 20:29

## 2021-05-16 RX ADMIN — FINASTERIDE 5 MILLIGRAM(S): 5 TABLET, FILM COATED ORAL at 09:35

## 2021-05-16 RX ADMIN — Medication 1000 UNIT(S): at 09:35

## 2021-05-16 RX ADMIN — MIRTAZAPINE 22.5 MILLIGRAM(S): 45 TABLET, ORALLY DISINTEGRATING ORAL at 20:29

## 2021-05-16 RX ADMIN — Medication 1 APPLICATION(S): at 09:40

## 2021-05-16 RX ADMIN — RISPERIDONE 0.25 MILLIGRAM(S): 4 TABLET ORAL at 17:41

## 2021-05-16 RX ADMIN — AMLODIPINE BESYLATE 5 MILLIGRAM(S): 2.5 TABLET ORAL at 09:35

## 2021-05-16 RX ADMIN — Medication 1 MILLIGRAM(S): at 09:34

## 2021-05-16 RX ADMIN — PANTOPRAZOLE SODIUM 40 MILLIGRAM(S): 20 TABLET, DELAYED RELEASE ORAL at 09:34

## 2021-05-16 RX ADMIN — RISPERIDONE 0.25 MILLIGRAM(S): 4 TABLET ORAL at 09:35

## 2021-05-17 PROCEDURE — 99231 SBSQ HOSP IP/OBS SF/LOW 25: CPT | Mod: 25

## 2021-05-17 PROCEDURE — 90870 ELECTROCONVULSIVE THERAPY: CPT

## 2021-05-17 RX ADMIN — MEMANTINE HYDROCHLORIDE 10 MILLIGRAM(S): 10 TABLET ORAL at 20:31

## 2021-05-17 RX ADMIN — MIRTAZAPINE 22.5 MILLIGRAM(S): 45 TABLET, ORALLY DISINTEGRATING ORAL at 20:31

## 2021-05-17 RX ADMIN — RISPERIDONE 0.5 MILLIGRAM(S): 4 TABLET ORAL at 20:31

## 2021-05-17 RX ADMIN — RISPERIDONE 0.25 MILLIGRAM(S): 4 TABLET ORAL at 17:17

## 2021-05-17 NOTE — BH INPATIENT PSYCHIATRY PROGRESS NOTE - NSBHASSESSSUMMFT_PSY_ALL_CORE
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  Recent neuropsych eval consistent with fronto-temporal dementia (FTD)  On evaluation, patient remains focused on food/ GI symptoms, reports mood as "ok", no behavioral concerns reported by staff.   Plan: Will c/w Memantine, Mirtazapine, and Risperidone at current dose. Will continue ECT Mon/ Wed/ Fri.

## 2021-05-17 NOTE — BH INPATIENT PSYCHIATRY PROGRESS NOTE - NSBHMETABOLIC_PSY_ALL_CORE_FT
BMI: BMI (kg/m2): 26.8 (05-08-21 @ 15:55)  HbA1c: A1C with Estimated Average Glucose Result: 5.5 % (03-23-21 @ 09:42)  BP: 141/88 (05-17-21 @ 12:15) (137/87 - 175/72)  Lipid Panel: Date/Time: 03-23-21 @ 09:42  Cholesterol, Serum: 172  Direct LDL: --  HDL Cholesterol, Serum: 60  Total Cholesterol/HDL Ration Measurement: --  Triglycerides, Serum: 60

## 2021-05-17 NOTE — BH INPATIENT PSYCHIATRY PROGRESS NOTE - NSBHFUPINTERVALHXFT_PSY_A_CORE
Patient seen for depression and behavioral disturbance. Chart reviewed and case discussed with nursing staff.  Per staff, patient remains focused on food/ GI symptoms. No behavioral issues. No PRNs. Patient seen prior to ECT today. On encounter, patient sitting in the day room, states he ate too much last night and had a big BM, denies pain/ discomfort. No hallucinations reported. He remains med compliant.

## 2021-05-17 NOTE — BH INPATIENT PSYCHIATRY PROGRESS NOTE - NSBHCHARTREVIEWVS_PSY_A_CORE FT
Vital Signs Last 24 Hrs  T(C): 36.8 (17 May 2021 12:15), Max: 36.9 (16 May 2021 15:22)  T(F): 98.2 (17 May 2021 12:15), Max: 98.4 (16 May 2021 15:22)  HR: 85 (17 May 2021 12:15) (75 - 94)  BP: 141/88 (17 May 2021 12:15) (137/87 - 175/72)  RR: 18 (17 May 2021 12:15) (16 - 18)  SpO2: 100% (17 May 2021 12:15) (100% - 100%)

## 2021-05-18 PROCEDURE — 99231 SBSQ HOSP IP/OBS SF/LOW 25: CPT

## 2021-05-18 RX ORDER — MIRTAZAPINE 45 MG/1
30 TABLET, ORALLY DISINTEGRATING ORAL AT BEDTIME
Refills: 0 | Status: DISCONTINUED | OUTPATIENT
Start: 2021-05-18 | End: 2021-05-24

## 2021-05-18 RX ADMIN — RISPERIDONE 0.25 MILLIGRAM(S): 4 TABLET ORAL at 08:53

## 2021-05-18 RX ADMIN — AMLODIPINE BESYLATE 5 MILLIGRAM(S): 2.5 TABLET ORAL at 08:53

## 2021-05-18 RX ADMIN — RISPERIDONE 0.5 MILLIGRAM(S): 4 TABLET ORAL at 20:58

## 2021-05-18 RX ADMIN — Medication 1 MILLIGRAM(S): at 08:53

## 2021-05-18 RX ADMIN — MEMANTINE HYDROCHLORIDE 10 MILLIGRAM(S): 10 TABLET ORAL at 20:58

## 2021-05-18 RX ADMIN — FINASTERIDE 5 MILLIGRAM(S): 5 TABLET, FILM COATED ORAL at 08:54

## 2021-05-18 RX ADMIN — MEMANTINE HYDROCHLORIDE 10 MILLIGRAM(S): 10 TABLET ORAL at 08:53

## 2021-05-18 RX ADMIN — MIRTAZAPINE 30 MILLIGRAM(S): 45 TABLET, ORALLY DISINTEGRATING ORAL at 21:03

## 2021-05-18 RX ADMIN — PANTOPRAZOLE SODIUM 40 MILLIGRAM(S): 20 TABLET, DELAYED RELEASE ORAL at 08:53

## 2021-05-18 RX ADMIN — Medication 1000 UNIT(S): at 08:53

## 2021-05-18 RX ADMIN — RISPERIDONE 0.25 MILLIGRAM(S): 4 TABLET ORAL at 17:55

## 2021-05-18 NOTE — BH INPATIENT PSYCHIATRY PROGRESS NOTE - NSBHCHARTREVIEWVS_PSY_A_CORE FT
Vital Signs Last 24 Hrs  T(C): 37 (18 May 2021 05:53), Max: 37 (18 May 2021 05:53)  T(F): 98.6 (18 May 2021 05:53), Max: 98.6 (18 May 2021 05:53)  HR: 85 (17 May 2021 12:15) (75 - 94)  BP: 141/88 (17 May 2021 12:15) (137/87 - 175/72)  RR: 18 (17 May 2021 12:15) (16 - 18)  SpO2: 100% (17 May 2021 12:15) (100% - 100%)

## 2021-05-18 NOTE — BH INPATIENT PSYCHIATRY PROGRESS NOTE - NSBHASSESSSUMMFT_PSY_ALL_CORE
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  Recent neuropsych eval consistent with fronto-temporal dementia (FTD)  On evaluation, patient remains focused on food/ GI symptoms, reports mood as "ok", no behavioral concerns reported by staff.   Plan:  Will increase Mirtazapine to 30mg at bedtime. Will c/w Memantine and Risperidone at current dose. Will continue ECT Mon/ Wed/ Fri; next treatment scheduled for 05/19.

## 2021-05-18 NOTE — BH INPATIENT PSYCHIATRY PROGRESS NOTE - NSBHFUPINTERVALHXFT_PSY_A_CORE
Patient seen for depression and behavioral disturbance. Chart reviewed and case discussed with nursing staff.  Per staff, patient remains focused on food/ GI symptoms. No behavioral issues. No PRNs. On encounter today, patient sitting in the day room and eating his breakfast, eating too quickly and encouraged to slow down, which he does. Patient denies any concern today. No hallucinations reported. He remains med compliant.

## 2021-05-18 NOTE — BH INPATIENT PSYCHIATRY PROGRESS NOTE - NSBHMETABOLIC_PSY_ALL_CORE_FT
BMI: BMI (kg/m2): 26.8 (05-08-21 @ 15:55)  HbA1c: A1C with Estimated Average Glucose Result: 5.5 % (03-23-21 @ 09:42)  BP: 141/88 (05-17-21 @ 12:15) (137/87 - 175/72)  Lipid Panel: Date/Time: 03-23-21 @ 09:42  Cholesterol, Serum: 172  HDL Cholesterol, Serum: 60  Triglycerides, Serum: 60

## 2021-05-19 LAB
ALBUMIN SERPL ELPH-MCNC: 3.4 G/DL — SIGNIFICANT CHANGE UP (ref 3.3–5)
ALP SERPL-CCNC: 39 U/L — LOW (ref 40–120)
ALT FLD-CCNC: 17 U/L — SIGNIFICANT CHANGE UP (ref 4–41)
ANION GAP SERPL CALC-SCNC: 12 MMOL/L — SIGNIFICANT CHANGE UP (ref 7–14)
AST SERPL-CCNC: 18 U/L — SIGNIFICANT CHANGE UP (ref 4–40)
BILIRUB SERPL-MCNC: 0.4 MG/DL — SIGNIFICANT CHANGE UP (ref 0.2–1.2)
BUN SERPL-MCNC: 18 MG/DL — SIGNIFICANT CHANGE UP (ref 7–23)
CALCIUM SERPL-MCNC: 8.5 MG/DL — SIGNIFICANT CHANGE UP (ref 8.4–10.5)
CHLORIDE SERPL-SCNC: 100 MMOL/L — SIGNIFICANT CHANGE UP (ref 98–107)
CO2 SERPL-SCNC: 27 MMOL/L — SIGNIFICANT CHANGE UP (ref 22–31)
CREAT SERPL-MCNC: 0.83 MG/DL — SIGNIFICANT CHANGE UP (ref 0.5–1.3)
GLUCOSE SERPL-MCNC: 88 MG/DL — SIGNIFICANT CHANGE UP (ref 70–99)
HCT VFR BLD CALC: 32.9 % — LOW (ref 39–50)
HGB BLD-MCNC: 10.4 G/DL — LOW (ref 13–17)
MCHC RBC-ENTMCNC: 29.1 PG — SIGNIFICANT CHANGE UP (ref 27–34)
MCHC RBC-ENTMCNC: 31.6 GM/DL — LOW (ref 32–36)
MCV RBC AUTO: 91.9 FL — SIGNIFICANT CHANGE UP (ref 80–100)
NRBC # BLD: 0 /100 WBCS — SIGNIFICANT CHANGE UP
NRBC # FLD: 0 K/UL — SIGNIFICANT CHANGE UP
OB PNL STL: POSITIVE
PLATELET # BLD AUTO: 185 K/UL — SIGNIFICANT CHANGE UP (ref 150–400)
POTASSIUM SERPL-MCNC: 3.7 MMOL/L — SIGNIFICANT CHANGE UP (ref 3.5–5.3)
POTASSIUM SERPL-SCNC: 3.7 MMOL/L — SIGNIFICANT CHANGE UP (ref 3.5–5.3)
PROT SERPL-MCNC: 6.3 G/DL — SIGNIFICANT CHANGE UP (ref 6–8.3)
RBC # BLD: 3.58 M/UL — LOW (ref 4.2–5.8)
RBC # FLD: 13.3 % — SIGNIFICANT CHANGE UP (ref 10.3–14.5)
SODIUM SERPL-SCNC: 139 MMOL/L — SIGNIFICANT CHANGE UP (ref 135–145)
WBC # BLD: 8.83 K/UL — SIGNIFICANT CHANGE UP (ref 3.8–10.5)
WBC # FLD AUTO: 8.83 K/UL — SIGNIFICANT CHANGE UP (ref 3.8–10.5)

## 2021-05-19 PROCEDURE — 99231 SBSQ HOSP IP/OBS SF/LOW 25: CPT | Mod: 25

## 2021-05-19 PROCEDURE — 99233 SBSQ HOSP IP/OBS HIGH 50: CPT

## 2021-05-19 PROCEDURE — 90870 ELECTROCONVULSIVE THERAPY: CPT

## 2021-05-19 RX ADMIN — RISPERIDONE 0.5 MILLIGRAM(S): 4 TABLET ORAL at 20:26

## 2021-05-19 RX ADMIN — MIRTAZAPINE 30 MILLIGRAM(S): 45 TABLET, ORALLY DISINTEGRATING ORAL at 21:15

## 2021-05-19 RX ADMIN — Medication 1 APPLICATION(S): at 21:16

## 2021-05-19 RX ADMIN — MEMANTINE HYDROCHLORIDE 10 MILLIGRAM(S): 10 TABLET ORAL at 20:26

## 2021-05-19 RX ADMIN — RISPERIDONE 0.25 MILLIGRAM(S): 4 TABLET ORAL at 18:12

## 2021-05-19 NOTE — BH TREATMENT PLAN - NSBHPRIMARYDX_PSY_ALL_CORE
MDD (major depressive disorder), single episode, severe with psychotic features    
Major depressive disorder    
Frontotemporal dementia    
MDD (major depressive disorder), single episode, severe with psychotic features

## 2021-05-19 NOTE — BH TREATMENT PLAN - NSTXDCOTHRDATEEST_PSY_ALL_CORE
19-Mar-2021

## 2021-05-19 NOTE — BH TREATMENT PLAN - NSTXHEALTHGOAL_PSY_ALL_CORE
Will be able to identify 3 healthy lifestyle choices such as medication adherence, obtaining primary care and wellness activities
Other...
Will be able to identify 3 healthy lifestyle choices such as medication adherence, obtaining primary care and wellness activities

## 2021-05-19 NOTE — BH TREATMENT PLAN - NSTXDEPRESGOAL_PSY_ALL_CORE
Will identify 2 coping skills that assist in improving mood
Other...
Will identify 2 coping skills that assist in improving mood
Exhibit improvements in self-grooming, hygiene, sleep and appetite
Attend and participate in at least 2 groups daily despite low mood/energy
Other...
Exhibit improvements in self-grooming, hygiene, sleep and appetite
Other...
Will identify 2 coping skills that assist in improving mood

## 2021-05-19 NOTE — BH INPATIENT PSYCHIATRY PROGRESS NOTE - NSBHFUPINTERVALHXFT_PSY_A_CORE
Patient seen for depression and behavioral disturbance. Chart reviewed and case discussed with nursing staff. Staff noticed blood tinged BM and informed MATTY, stool positive for occult blood. On encounter today, patient reports that he has been having large BMs, but denies any pain, discomfort. Denies dizziness. No hallucinations reported. He remains med compliant.

## 2021-05-19 NOTE — BH TREATMENT PLAN - NSTXHEALTHINTERMD_PSY_ALL_CORE
Routine health maintenance
Routine health evaluation
Routine health maintenance
Routine health evaluation
Routine health evaluation
Routine health maintenance
Routine health evaluation
Routine health maintenance
Routine health maintenance

## 2021-05-19 NOTE — BH INPATIENT PSYCHIATRY PROGRESS NOTE - NSBHMETABOLIC_PSY_ALL_CORE_FT
BMI: BMI (kg/m2): 26.8 (05-08-21 @ 15:55)  HbA1c: A1C with Estimated Average Glucose Result: 5.5 % (03-23-21 @ 09:42)   BP: 139/61 (05-19-21 @ 10:00) (127/71 - 175/72)  Lipid Panel: Date/Time: 03-23-21 @ 09:42  Cholesterol, Serum: 172  HDL Cholesterol, Serum: 60  Triglycerides, Serum: 60

## 2021-05-19 NOTE — BH INPATIENT PSYCHIATRY PROGRESS NOTE - CURRENT MEDICATION
MEDICATIONS  (STANDING):  amLODIPine   Tablet 5 milliGRAM(s) Oral daily  ammonium lactate 12% Lotion 1 Application(s) Topical two times a day  cholecalciferol 1000 Unit(s) Oral daily  finasteride 5 milliGRAM(s) Oral daily  folic acid 1 milliGRAM(s) Oral daily  memantine 10 milliGRAM(s) Oral two times a day  mirtazapine 30 milliGRAM(s) Oral at bedtime  pantoprazole    Tablet 40 milliGRAM(s) Oral before breakfast  risperiDONE   Tablet 0.25 milliGRAM(s) Oral <User Schedule>  risperiDONE   Tablet 0.5 milliGRAM(s) Oral at bedtime    MEDICATIONS  (PRN):  aluminum hydroxide/magnesium hydroxide/simethicone Suspension 30 milliLiter(s) Oral every 4 hours PRN Dyspepsia  artificial  tears Solution 1 Drop(s) Both EYES four times a day PRN eye irritation  glycerin Suppository - Adult 1 Suppository(s) Rectal daily PRN constipation  risperiDONE   Tablet 0.25 milliGRAM(s) Oral every 8 hours PRN Agitation  saline laxative (FLEET) Rectal Enema 1 Enema Rectal daily PRN constipation

## 2021-05-19 NOTE — BH TREATMENT PLAN - NSTXCAREGIVERPARTICIPATE_PSY_P_CORE
Family/Caregiver participated in identification of needs/problems/goals for treatment

## 2021-05-19 NOTE — BH TREATMENT PLAN - NSTXDCOTHRINTERSW_PSY_ALL_CORE
support and discharge planning. patient defers to his sister:  sister reports she is his health care proxy and POA; she agreed to provide.     Family will make outreach to Dr Littlejohn, as they are interested in ECT if patient does not improve.  Sister is concerned patient will not improve - dual plan:  return home if improves but 7 hours a day is  likely not enough and will need placement.
patient is participating with ECT in order to address mood. Family agree and hope his mood and somatic symptoms improves prior to SNF placement for dementia. SW interfaces with accepting SNF/patient/ family and team.  [facility] accepted [facility] with insurance authorization to  on May 31.
patient  is complying with treatment. patient participated with 4/6 neuropsyeval.  patient diagnoses Frontotemporal dementia. As discussed previously withpatient he might require SNF placement and he agreed if his sister agreed. his sister has been advised of diagnosis and agrees with plan. SW has initiated Level II- patient provided with Notification and sister is advised - who agrees.
patient met with social work and signed releases of info naming his sister and [facility] for resumption of home care. Patient is prior [facility] patient - non complaint.
sw had arranged for patient to be admitted to SNF when ECT was added to treatment plan.  SNF is aware of auth for SNF expires May 31 and had promised a bed with advanced notice.  Patient has shown no progress and team has determined to cancel ECT and resume plan for placement. SW has  been in contact with accepting SNF and today advised of readiness. Pending their response with admission date.
patient has participated with treatment and evaluations. Patient is less paranoid but remains somatic. Patient participated with neuropsyological eval which determined patient has frontal temporal dementia. Patient agrees to SNF placement as his sister agrees. SOL[facility] provided sister with psych edu.  [facility] application was submitted and disposition received 4/21. 4/21-  [facility] requested updated [facility] and finalized screen. reviewed [facility] with patient. patient signed SCREEN. referral made today 4/22 to families preferred SNF placement [facility] - due to [facility] and cultural match to patient.  Sister had reported that she would have knee surgery on 4/20 and would accept [facility] for her brother. if not accepted, requested kosher snf (not pre cooked packaged meals) .  ordered a walker for patient to have at discharge- delievered to unit.
patient complying with treatment. mental status continues to require treatment. not stablized - somatic, depressed, confused.  Team is considering a neuropsy when more stable to  assess cognitive.  Team indicates patient will require SNF. Patient defers to his sister who defers to team to determine level of care. Psychoeducation with sister. Identified patient has managed TC - and  [facility] protocol for SNF placement if indicate.  Patient signed release of info to [facility]-  [employer].
sw provided support to patient. interfaced with SNF to postpone placement as Team/ patient/ family agreed to ECT. SNF agreed to postpone admission for LTC. Advocated with insurance  for Authorization for LTC. Confirmed accepted fiscally by SNF.  Patient agrees to SNF but wishes he was able to return home.
social work offered patient support but he does not wish to engage with social work for dc planning due to mental status. patient defers to his sister who expressed concern about mental status. she resists SNF but will now try to be more open to SNF or assisted living.

## 2021-05-19 NOTE — BH TREATMENT PLAN - NSTXFALLINTERMD_PSY_ALL_CORE
Will monitor for postural hypotension. 
PT consult. Will monitor for postural hypotension. 
Will monitor for postural hypotension. 
Will monitor for postural hypotension. PT consult
Will monitor for postural hypotension. 
PT consult. Will monitor for postural hypotension. 
PT consult.
Will monitor for postural hypotension. PT consult
Will monitor for postural hypotension.

## 2021-05-19 NOTE — BH TREATMENT PLAN - NSDCCRITERIA_PSY_ALL_CORE
50% improvement in mood and agitation. Patient will deny any SI/HI. CGI < 3

## 2021-05-19 NOTE — BH TREATMENT PLAN - NSTXFALLDATEEST_PSY_ALL_CORE
18-Apr-2021
18-Mar-2021
18-Apr-2021
18-Mar-2021
19-Mar-2021
19-Mar-2021
18-Mar-2021
19-Mar-2021
19-Mar-2021

## 2021-05-19 NOTE — ECT PRE-PROCEDURE CHECKLIST - INV PERIPH IV LOCATION
Right:/basilic vein
Right:/median cubital vein
Right:
Right:/median cubital vein
Right:/metacarpal vein

## 2021-05-19 NOTE — BH TREATMENT PLAN - NSTXDEPRESDATETRGT_PSY_ALL_CORE
28-Apr-2021
30-Apr-2021
21-Apr-2021
14-Apr-2021
26-Mar-2021
25-May-2021
07-May-2021
06-Apr-2021
18-May-2021

## 2021-05-19 NOTE — BH TREATMENT PLAN - NSTXCOPEDATETRGT_PSY_ALL_CORE
24-Mar-2021
06-Apr-2021
28-Apr-2021
14-Apr-2021
21-Apr-2021
18-May-2021
05-May-2021
25-May-2021
12-May-2021

## 2021-05-19 NOTE — BH TREATMENT PLAN - NSTXCONFINTERMD_PSY_ALL_CORE
Will continue Memantine 
Will continue Memantine 
Will initiate Memantine and titrate dose as tolerated
Will continue Memantine 

## 2021-05-19 NOTE — BH TREATMENT PLAN - NSTXFALLDATETRGT_PSY_ALL_CORE
18-May-2021
06-Apr-2021
28-Apr-2021
14-Apr-2021
24-Mar-2021
12-May-2021
05-May-2021
21-Apr-2021
25-May-2021

## 2021-05-19 NOTE — BH INPATIENT PSYCHIATRY PROGRESS NOTE - NSBHCHARTREVIEWVS_PSY_A_CORE FT
Vital Signs Last 24 Hrs  T(C): 36.7 (19 May 2021 10:00), Max: 36.9 (18 May 2021 15:33)  T(F): 98.1 (19 May 2021 10:00), Max: 98.5 (19 May 2021 03:15)  HR: 81 (19 May 2021 10:00) (76 - 92)  BP: 139/61 (19 May 2021 10:00) (127/71 - 156/71)  RR: 16 (19 May 2021 10:00) (16 - 18)  SpO2: 97% (19 May 2021 10:00) (96% - 97%)

## 2021-05-19 NOTE — BH TREATMENT PLAN - NSTXFALLGOAL_PSY_ALL_CORE
Call for assistance before getting out of bed or chair
Ask for assistance when getting out of bed/chair and upon ambulation
Call for assistance before getting out of bed or chair

## 2021-05-19 NOTE — BH TREATMENT PLAN - NSTXHEALTHDATEEST_PSY_ALL_CORE
19-Mar-2021
19-Mar-2021
18-Apr-2021
18-Mar-2021
18-Apr-2021
18-Mar-2021
18-Mar-2021
19-Mar-2021
19-Mar-2021

## 2021-05-19 NOTE — BH TREATMENT PLAN - NSTXCOPEINTERRN_PSY_ALL_CORE
encouraged pt to attend therapeutic groups and utilize effective coping skills and relaxation techniques
Encouraged pt to take medication as ordered and verbalize the feeling to staff. Educated the pt to attend the group and involve with activity.
encouraged pt to verbalize feelings and needs, encouraged utilization of relaxation techniques and effective coping skills.
Assess mood and affect. Encouraged groups and socialization. Encourage patient to verbalize feeling and concerns. Encourage use of coping skills.
assist with identifying positive coping mechanisms
encouraged pt to verbalize feelings and needs, encouraged participation in therapeutic groups and activities
Encouraged pt to verbalize feelings and concerns.
encouraged pt to utilize relaxation techniques and coping skills, encouraged participation in therapeutic groups

## 2021-05-19 NOTE — BH TREATMENT PLAN - NSTXDEPRESGOALOTHER_PSY_ALL_CORE
Pt will identify and utilize at least 2 coping skills to address sadness, anxiety, and negative ruminations for improved symptom management
Pt will identify and utilize at least 2 coping skills to address sadness, anxiety, and negative ruminations for improved symptom management.
Pt will identify and utilize at least 2 coping skills to address sadness, anxiety, and negative ruminations for improved symptom management.

## 2021-05-19 NOTE — BH TREATMENT PLAN - NSTXHEALTHINTERRN_PSY_ALL_CORE
Reviewed healty food choices with pateint. Encouraged continued medication compliance.
educated pt on proper nutrition and fluid intake, encouraged pt to exert independence with ADLs
encourage self care.
provided food/nutrition plan, encouraged pt to eat slowly and express concerns to staff.
provided food/nutrition plan, encouraged pt to eat slowly and express concerns to staff.

## 2021-05-19 NOTE — BH TREATMENT PLAN - NSTXCOPEGOAL_PSY_ALL_CORE
Identify and utilize 2 coping skills that meet their needs
Other...
Identify and utilize 2 coping skills that meet their needs

## 2021-05-19 NOTE — BH TREATMENT PLAN - NSTXDEPRESINTERMD_PSY_ALL_CORE
Will continue Mirtazapine and titrate dose as needed. ECT also being considered
Will continue Mirtazapine
Will continue Mirtazapine and titrate dose as needed. will continue ECT Mon/ Wed/ Fri
Will continue Mirtazapine
Will continue Mirtazapine. Will evaluate need to continue ECT 
Will continue Mirtazapine
Will continue Mirtazapine

## 2021-05-19 NOTE — BH TREATMENT PLAN - NSTXPROBDCOTHR_PSY_ALL_CORE
DISCHARGE ISSUE - OTHER

## 2021-05-19 NOTE — BH TREATMENT PLAN - NSTXDEPRESINTERPR_PSY_ALL_CORE
Psych rehab staff will encourage daily group/activity engagement as well as facilitate goal attainment over the next 7 days for improved symptom management.
Pt declined individual session with writer as he reported feeling unwell. Pt has maintain progress towards goal attainment this week. He continues to struggle with identifying coping skills. Pt also has difficulty utilizing coping skill presented to him. Psych rehab staff will continue to encourage daily group/activity engagement as well as facilitate goal attainment over the next 7 days for improved symptom management.
Writer met with patient in order to discuss progress towards Psychiatric Rehabilitation goals over the past week to which patient has demonstrated progress. Patient is increasingly receptive to skill development. Patient was receptive to reading a book as a healthy coping skill and was provided with a book to read while on the unit. Writer will continue to engage patient daily in order to provide support and assist patient in demonstrating progress towards Psychiatric Rehabilitation goals over the next 7 days.
Pt was seen 1:1. Pt exhibited improvement with identifying coping skills today given encouragement. He identified bingo, exercise, and music as three positive activities he enjoys engaging in. However, pt was unable to engage in chair exercise group this morning despite encouragement.  Pt continues to require significant assistance to utilize coping skills. He will benefit from continuing to work towards goal attainment.
Writer met with patient in order to discuss progress towards Psychiatric Rehabilitation goals over the past week. Patient demonstrated difficulty engaging in meaningful conversation in the context of somatic preoccupation and poor insight. Patient does not seem to have demonstrated any progress towards Psychiatric Rehabilitation goals over the past week. Writer encouraged patient to engage in skill development and facilitated a mindfulness activity with the patient however, patient was unable to meaninfulfully engage/identify alternative coipng skills at this time. Psychiatric Rehabliitation Staff will continue to engage patient daily in order to provide support and assist patient in demonstrating progress towards Psychiatric Rehabilitation goals over the next 7 days.
Pt was seen 1:1. Pt met his current goal. He was able to identify over 2 coping skills to improve his mood given minimal assistance. His coping skills included watching TV, reading a magazine, and talking to staff.
Writer met with patient in order to discuss progress towards Psychiatric Rehabilitation goals over the past week to which patient has demonstrated some, although minimal progress. Patient continues to struggle to meaningfully engage in skill development however, patient was able to participate meaningfully in a coloring activity with much assistance and encouragement. Writer will continue to engage patient daily in order to provide support and assist patient in demonstrating progress towards Psychiatric Rehabilitation goals over the next 7 days.
Psychiatric rehabilitation staff will continue to work with patient with identifying adaptive coping skills for improved symptom management.
Pt was seen individually by writer. He was able to begin exploring coping skills given assistance. Pt remains disorganized which is impacting his ability to identify coping skills. However, he was able to identify several activities he would like to engage in upon discharge which he enjoys including shopping and exercise. Psych rehab staff will encourage daily activity engagement as well as facilitate goal attainment over the next 7 days for improved symptom management.

## 2021-05-19 NOTE — BH TREATMENT PLAN - NSTXHEALTHDATETRGT_PSY_ALL_CORE
24-Mar-2021
31-Mar-2021
18-Apr-2021
14-Apr-2021
21-Apr-2021
12-May-2021
25-May-2021
12-May-2021
05-May-2021

## 2021-05-19 NOTE — BH TREATMENT PLAN - NSTXDCOTHRGOAL_PSY_ALL_CORE
patient will participate in treatment for improved mental status and ability to effectively engage with discharge planning.
patient will comply with treatment and improve mental status in order to effectively engage with dc plan for snf placement.
patient will comply with treatment in order to improve mental status and be able to effectively engage with discharge planning.
patient will comply with treatment in order to improve mental status and be able to effectively engage with discharge planning.
Complex. Noncompliant patient will comply with treatment and improve mental status in order to engage effectively with discharge planning
patient will comply with treatment in order to improve mental status and be able to effectively engage with dc planning.
patient will comply with treatment to improve mental status in order to be able to engage effectively with discharge planning.
patient will participate in treatment in order to improve mental status and effectively engage with discharge planning.
patient will participate in treatment in order to improve mental status and effectively engage with discharge planning. patient will accept placement in SNF as planned.

## 2021-05-19 NOTE — BH TREATMENT PLAN - NSTXCOPEDATEEST_PSY_ALL_CORE
18-Mar-2021
19-Mar-2021
19-Mar-2021
18-Apr-2021
18-Mar-2021
19-Mar-2021
19-Mar-2021
18-Mar-2021
18-Apr-2021

## 2021-05-19 NOTE — BH TREATMENT PLAN - NSTXPATIENTPARTICIPATE_PSY_ALL_CORE
Patient participated in identification of needs/problems/goals for treatment
No, patient unwilling to participate
Patient participated in identification of needs/problems/goals for treatment

## 2021-05-19 NOTE — BH TREATMENT PLAN - NSTXCONFGOAL_PSY_ALL_CORE
Will ask for assistance as required
Will accept reality orientation 3x a day
Will ask for assistance as required
Will ask for assistance as required
Will be able to identify when to ask for assistance

## 2021-05-19 NOTE — BH TREATMENT PLAN - NSTXDEPRESINTERRN_PSY_ALL_CORE
assist with and encourage participation with ADLs
Assess patient's mood and thought process. Encourage to utilize coping skills in managing daily stressors. Encourage attending and participating in groups. Encourage medication compliance.
encouraged pt to participate in therapeutic groups, encouraged pt to utilize effective coping skills and relaxation techniques.
encouraged participation in therapeutic groups and activities.
encouraged pt to participate in therapeutic groups and activities, encouraged pt to verbalize feelings and needs

## 2021-05-19 NOTE — BH TREATMENT PLAN - NSTXDCOTHRDATETRGT_PSY_ALL_CORE
29-Apr-2021
16-Apr-2021
12-May-2021
02-Apr-2021
09-Apr-2021
26-Mar-2021
19-May-2021
22-Apr-2021
26-May-2021

## 2021-05-19 NOTE — BH TREATMENT PLAN - NSPTSTATEDGOAL_PSY_ALL_CORE
"I want to be ok"
"I want to be ok"
"I can't take care of myself"
" I'm not good "
Unable to elicit response

## 2021-05-19 NOTE — BH TREATMENT PLAN - NSTXFALLINTERRN_PSY_ALL_CORE
encouraged pt to ask for assistance when oob
Encourage pt to call for assistance. Assist with ADLs.
provided assistance when oob, encouraged pt to use RW properly, encouraged pt to perform ADLs with staff assistance.
Assess patient gait, maintain falls risk precautions throughout stay, encourage patient to reach out to staff for support. Maintain routine checks for safety.
Assisted his needs and assist with ambulation. Pt consult. Close monitor for fall risk.
assist with ADLs as needed.
provided assistance when oob, reminded pt to utilize RW when ambulating
encouraged pt to ask for assistance when oob, educated pt on proper use of RW

## 2021-05-19 NOTE — BH INPATIENT PSYCHIATRY PROGRESS NOTE - NSBHASSESSSUMMFT_PSY_ALL_CORE
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  Recent neuropsych eval consistent with fronto-temporal dementia (FTD)  Patient had 3 formed BMs yesterday, stool positive for occult blood. Hemoglobin-10.4 g/dL   Plan: Will c/w Mirtazapine, Memantine and Risperidone at current dose. Will evaluate need for continuing ECT. Hospitalist informed about positive stool occult blood and low hemoglobin level, input appreciated, will f/u recommendations.

## 2021-05-19 NOTE — PROGRESS NOTE ADULT - ASSESSMENT
69 yo M w/ HTN, HLD, vitD deficiency, depression, recent neuropysch diagnosis of frontotemporal dementia, presenting w/ aggression towards HHA and self injurious behavior, admitted to [facility] for further psychiatric management; with blood in stool    # Blood in stool: Hemodynamically stable.  Monitor for further bleeding.  Repeat Hb tomorrow.  May be due to hemorroids.  Pt on PPI.  If Hb stable patient may follow up as outpatient with GI.    # Essential HTN  -continue current meds    # HLD  - c/w DASH/TLC diet    # VitD deficiency  - recent vit D level 39.4, improved  - continue to monitor, on vit D supplementation    # GERD:  c/w ppi    # MDD/dementia  - safety precautions, medication management as per psych  - plan for ECT   - c/w ensure enlive supplementation, encourage PO intake

## 2021-05-19 NOTE — BH TREATMENT PLAN - NSTXPROBCONF_PSY_ALL_CORE
CONFUSION, ACUTE/CHRONIC

## 2021-05-19 NOTE — BH TREATMENT PLAN - NSTXPROBHEALTH_PSY_ALL_CORE
HEALTH MAINTENANCE, INEFFECTIVE

## 2021-05-19 NOTE — BH TREATMENT PLAN - NSTXCOPEINTERMD_PSY_ALL_CORE
Will encourage patient to participate in therapy groups to learn coping skills. 

## 2021-05-19 NOTE — BH TREATMENT PLAN - NSTXDEPRESDATEEST_PSY_ALL_CORE
09-Apr-2021
19-Mar-2021
18-Apr-2021
09-Apr-2021
19-Mar-2021
18-Mar-2021
18-Mar-2021
09-Apr-2021
09-Apr-2021

## 2021-05-19 NOTE — BH TREATMENT PLAN - NSTXDCOTHRINTERMD_PSY_ALL_CORE
Patient and family requesting discharge to SNF.
Patient and family requesting discharge to SNF.
Patient and family considering discharge to SNF.
Patient and family requesting discharge to SNF.
Patient and family considering discharge to SNF.
Patient and family requesting discharge to SNF.
Patient and family requesting discharge to SNF.

## 2021-05-19 NOTE — BH TREATMENT PLAN - NSTXCONFINTERRN_PSY_ALL_CORE
provided assistance and redirection as needed
encouraged pt to verbalize needs, redirected and reoriented pt to place, situation, and time
Assess patient's mental status. Provide reality testing and redirect/reorient patient as needed. Encourage patient to request staff assistance.
provided reality testing and redirection as needed, encouraged pt to ask for assistance as needed
Redirection as needed. Provide assistance with ADLs.

## 2021-05-19 NOTE — BH TREATMENT PLAN - NSTXPLANTHERAPYSESSIONSFT_PSY_ALL_CORE
04-30-21  Type of therapy: Creative arts therapy,Health and fitness,Leisure development,Music therapy,Peer advocate,Self esteem,Symptom management  Type of session: Individual  Level of patient participation: Participated with encouragement  Duration of participation: 15 minutes  Therapy conducted by: Psych rehab  Therapy Summary: Pt was seen for individual session today. He was noted to be more organized and exhibited an increased ability to explore his coping skills and constructively engage in session. However, pt remains confused, anxious, disorganized, and somatically preoccupied.  Pt continues to ruminate about what he ate and eating too much food. Pt has appeared less internally preoccupied this week. He attends a large portion of psych rehab groups. He continues to require maximum assistance to constructively engage in group activities. Pt’s insight and judgement remain limited, but are improving.  Pt denied anxiety. He endorsed symptoms of depression. Pt denies SI/HI.  He requires assistance of staff to maintain his ADLs.  Pt is compliant with his medication. Pt is visible in the milieu. He spends the majority of the day in the dayroom. Pt does not initiate social interaction with peers. He is generally pleasant but minimally engaged when approached by writer.  
  03-26-21  Type of therapy: Creative arts therapy,Health and fitness,Leisure development,Music therapy,Peer advocate,Stress management  Type of session: Individual  Level of patient participation: Participated with encouragement  Duration of participation: 15 minutes  Therapy conducted by: Psych rehab  Therapy Summary: Pt was seen individually by psych rehab staff member. Pt is visible in the milieu. Pt's impulse control has improved this week. During pt's first few day on the unit he often spoke about how he was going to die to peers/staff and asked for help. Despite support and re-direction, pt often ruminated out loud throughout groups. Pt continues to ruminate out loud within groups, however, his thought content has changed. Today pt spoke of eating too much for breakfast. During individual session, pt denied SI/HI or believing he was going to die. He was more hopeful and future oriented. Pt has limited insight into illness symptom. He often appears anxious and dysphoric. Pt is noted to be disorganized, and tangential. He requires assistance with his ADLs. Pt's judgment and insight are improving, but remain limited. Pt has attended a fair portion of psych rehab groups. He required assistance to constructively engage in group activities.  
  04-15-21  Type of therapy: Creative arts therapy,Health and fitness,Leisure development,Mindfulness  Type of session: Individual  Level of patient participation: Participates  Duration of participation: 15 minutes  Therapy conducted by: Psych rehab  Therapy Summary: Writer and patient discussed current level of functioning and addressed concerns surrounding the current hospitalization. Patient discussed continued distress with feeling as though he eats too much. Writer attempted to engage patient in reality testing and skill development with minimal affect as patient was not able to be redirected at this time. During the interaction, writer provided empathy, support and encouragement. Patient remains consistently compliant with standing medications. Patient endorses some, although minimal improvements in regards to symptoms. Patient is less agitated and mood is increasingly stable. Patient continues to endorse somatic preoccupation, anxiety, restlessness and rumanitive thought content. Patient presents as somewhat internally preoccupied often observed mumbling to him self, however, patient denies AH/VH in addition to SI/HI.     Patient is visible on the unit and maintains fair behavioral control. Patient is observed to self-induce vomitting at times in the context of somatic symptoms. Patient is verbal and polite with staff. Patient is isolative with peers. Patient has attended some of the Psychiatric Rehabilitation groups offered over the past week. Patient is partially engaged, appropriate in group, and requires some redirection in the context of self-induced vomitting.    04-19-21  Type of therapy: Other,Physical therapy  Type of session: Individual  Level of patient participation: Participated with encouragement  --  Therapy conducted by: Other (specify),Physical therapy  Therapy Summary: Patient engaged in graded therex in sitting to improve B LE strength. Patient also ambulated on unit using RW with supervision. Patient continue to require RW for ambulation and ADL in order to reduce risk of fall secondary to standing balance deficit.  
  05-06-21  Type of therapy: Coping skills,Health and fitness,Leisure development,Peer advocate  Type of session: Individual  Level of patient participation: Participated with encouragement  Duration of participation: 15 minutes  Therapy conducted by: Psych rehab  Therapy Summary: Writer and patient discussed current level of functioning and addressed concerns surrounding the current hospitalization. Patient continues to be preoccupied with food and bowel movements. Patient has been incontinent of feces on several occassions over the past week. Patient has been consistently compliant with standing medications, although does not seem to endorse any clinically significant improvements in regards to symptoms over the past week. Patient continues to refrain from engaging in self-injurious behaviors, although is observed to attempt to purge daily. Patient is somewhat receptive to redirection. Patient reported fair sleep. Patient's insight is poor and judgement is compromised. Writer was unable to assess for SI/HI, AH/VH, although per chart patient denies. Patient reports feeling worse at this time in the context of physical discomfort.     Patient is visible on the unit and has maintained partial behavioral control over the past week. Patient remains isolative with peers. Patient is verbal with staff and partially cooperative. Patient attended a few of the Psychiatric Rehabilitation groups offered over the past week with encouragement. Patient is minimally engaged, although appropriate, and requires redirection in the context of purging behaviors. Patient requires assistance tolerating group structure and duration.  
  04-09-21  Type of therapy: Other,Individual sessions   Type of session: Individual  Level of patient participation: Participated with encouragement  Duration of participation: 15 minutes  Therapy conducted by: Psych rehab  Therapy Summary: In response to the COVID-19 outbreak, there has been a shift in hospital wide policies and protocols. As a result, it should be noted that unit structure and activities have been limited to 1:1 interventions temporarily to promote safety of patients and staff at this time.   Pt has been seen individually by psych rehab staff throughout the week and was provided with individual activities. During today’s session pt was able to meet his current psych rehab goal of identifying 2 coping skills.  Pt reports significant improvement in mood and anxiety, stating that is "OK".  However, pt is noted to be dysphoric, anxious, disorganized, and ruminative about his food consumption. Pt also reported stomach and chest pain which he contributed to eating to quickly (RN informed). Pt appears somatically preoccupied, often putting himself down throughout the session due to his food consumption. He denied SI/HI. While pt remains confused and disorganized, his ability to constructively talk about his coping skills and daily activities has improved. Pt's insight and judgment is improving.  His impulse control is fair; at times pt is restless and exhibits poor safety awareness with his rolling walker. At ADLs are noted to be fair-poor. He is disheveled.  Psych rehab staff will encourage daily activity engagement as well as facilitate goal attainment over the next 7 days for improved symptom management.  
  05-13-21  Type of therapy: Creative arts therapy,Health and fitness,Leisure development,Mindfulness,Music therapy,Peer advocate,Psychoeducation,Spirituality,Symptom management  Type of session: Individual  Level of patient participation: Attentive,Participated with encouragement  Duration of participation: 15 minutes  Therapy conducted by: Psych rehab  Therapy Summary: Writer and patient discussed level of functioning and engaged in skill development. Patient continues to ruminate on food and bowel movements. Writer provided empathy, support and psychoeducation. Patient has been consistently compliant with standing medications and ECT treatments, although appears to endorse minimal improvements in regards to symptoms at this time. Patient is somewhat less internally preoccupied. Patient reports mood to be 'okay'. Patient endorses fair sleep. Patient continues to endorse anxiety. Patient has been noted to eat very quickly and to try to take other patients food. Patient denies current and active SI/HI, AH/VH. Insight and judgement remain poor.     Patient is visible on the unit and has maintained partial behavioral control. Patient is intrusive with peers during meal times. Patient is otherwise isolative with peers. Patient is increasingly verbal with staff. Patient has attended a few of the Psychiatric Rehabilitation groups offered over the past week. Patient is minimally engaged in group and requires some redirection.  
  04-23-21  Type of therapy: Health and fitness,Leisure development,Mindfulness,Music therapy,Peer advocate  Type of session: Individual  Level of patient participation: Resistance to participation  Duration of participation: Less than 15 minutes,Pt declined session   Therapy conducted by: Psych rehab  Therapy Summary: Pt declined 1:1 session with writer as he was feeling unwell.  Pt remains confused, anxious, disorganized, and somatically preoccupied.  Pt continues to ruminate about what he ate and eating too much food. Pt can often be observed mumbling to himself. He requires assistance of staff to maintain his ADLs.  Pt is compliant with his medication. Pt is visible in the milieu. He spends the majority of the day in the dayroom. Pt attends a large portion of psych rehab groups. He requires maximum assistance to constructively engage in group activities. Pt’s insight and judgement remain limited, but are improving.

## 2021-05-20 LAB
HCT VFR BLD CALC: 34.1 % — LOW (ref 39–50)
HGB BLD-MCNC: 10.5 G/DL — LOW (ref 13–17)
MCHC RBC-ENTMCNC: 28.9 PG — SIGNIFICANT CHANGE UP (ref 27–34)
MCHC RBC-ENTMCNC: 30.8 GM/DL — LOW (ref 32–36)
MCV RBC AUTO: 93.9 FL — SIGNIFICANT CHANGE UP (ref 80–100)
NRBC # BLD: 0 /100 WBCS — SIGNIFICANT CHANGE UP
NRBC # FLD: 0 K/UL — SIGNIFICANT CHANGE UP
PLATELET # BLD AUTO: 174 K/UL — SIGNIFICANT CHANGE UP (ref 150–400)
RBC # BLD: 3.63 M/UL — LOW (ref 4.2–5.8)
RBC # FLD: 13.2 % — SIGNIFICANT CHANGE UP (ref 10.3–14.5)
WBC # BLD: 7.59 K/UL — SIGNIFICANT CHANGE UP (ref 3.8–10.5)
WBC # FLD AUTO: 7.59 K/UL — SIGNIFICANT CHANGE UP (ref 3.8–10.5)

## 2021-05-20 PROCEDURE — 99231 SBSQ HOSP IP/OBS SF/LOW 25: CPT

## 2021-05-20 RX ADMIN — Medication 1 MILLIGRAM(S): at 09:00

## 2021-05-20 RX ADMIN — PANTOPRAZOLE SODIUM 40 MILLIGRAM(S): 20 TABLET, DELAYED RELEASE ORAL at 08:58

## 2021-05-20 RX ADMIN — RISPERIDONE 0.5 MILLIGRAM(S): 4 TABLET ORAL at 20:47

## 2021-05-20 RX ADMIN — MEMANTINE HYDROCHLORIDE 10 MILLIGRAM(S): 10 TABLET ORAL at 08:59

## 2021-05-20 RX ADMIN — AMLODIPINE BESYLATE 5 MILLIGRAM(S): 2.5 TABLET ORAL at 08:59

## 2021-05-20 RX ADMIN — Medication 1 APPLICATION(S): at 21:57

## 2021-05-20 RX ADMIN — MIRTAZAPINE 30 MILLIGRAM(S): 45 TABLET, ORALLY DISINTEGRATING ORAL at 20:46

## 2021-05-20 RX ADMIN — FINASTERIDE 5 MILLIGRAM(S): 5 TABLET, FILM COATED ORAL at 08:59

## 2021-05-20 RX ADMIN — Medication 1 APPLICATION(S): at 09:02

## 2021-05-20 RX ADMIN — MEMANTINE HYDROCHLORIDE 10 MILLIGRAM(S): 10 TABLET ORAL at 21:57

## 2021-05-20 RX ADMIN — RISPERIDONE 0.25 MILLIGRAM(S): 4 TABLET ORAL at 17:27

## 2021-05-20 RX ADMIN — RISPERIDONE 0.25 MILLIGRAM(S): 4 TABLET ORAL at 08:59

## 2021-05-20 RX ADMIN — Medication 1000 UNIT(S): at 09:00

## 2021-05-20 NOTE — BH INPATIENT PSYCHIATRY PROGRESS NOTE - NSBHCHARTREVIEWVS_PSY_A_CORE FT
Vital Signs Last 24 Hrs  T(C): 36.5 (20 May 2021 08:05), Max: 36.9 (19 May 2021 15:30)  T(F): 97.7 (20 May 2021 08:05), Max: 98.5 (19 May 2021 15:30)  Orthostatic VS  05-20-21 @ 08:05   Sitting BP: 124/69 HR: 68  Standing BP: 130/72 HR: 76  Site: upper right arm   Mode: electronic

## 2021-05-20 NOTE — BH INPATIENT PSYCHIATRY PROGRESS NOTE - NSBHMETABOLIC_PSY_ALL_CORE_FT
BMI: BMI (kg/m2): 26.8 (05-08-21 @ 15:55)  HbA1c: A1C with Estimated Average Glucose Result: 5.5 % (03-23-21 @ 09:42)  BP: 127/66 (05-19-21 @ 11:00) (127/66 - 156/71)  Lipid Panel: Date/Time: 03-23-21 @ 09:42  Cholesterol, Serum: 172  Direct LDL: --  HDL Cholesterol, Serum: 60  Total Cholesterol/HDL Ration Measurement: --  Triglycerides, Serum: 60

## 2021-05-20 NOTE — BH INPATIENT PSYCHIATRY PROGRESS NOTE - NSBHFUPINTERVALHXFT_PSY_A_CORE
Patient seen for depression and behavioral disturbance. Chart reviewed and case discussed with nursing staff. No overnight events. No PRNs. Pt moving bowels and stools well-formed. On encounter today, patient reports mood as "OK", states he eats fast and that sometimes that upsets his stomach, otherwise denies any denies any pain/discomfort. Patient advised to eat slowly. Denies dizziness. No hallucinations reported. Patient remains med compliant.

## 2021-05-20 NOTE — BH INPATIENT PSYCHIATRY PROGRESS NOTE - NSBHASSESSSUMMFT_PSY_ALL_CORE
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  Recent neuropsych eval consistent with fronto-temporal dementia (FTD)  Patient in behavioral control. Taking meds. Repeat Hemoglobin-10.5 g/dL (10.4 on 05/19)  Plan: Will c/w Mirtazapine, Memantine and Risperidone at current dose. Will evaluate need for continuing ECT.

## 2021-05-20 NOTE — BH INPATIENT PSYCHIATRY PROGRESS NOTE - MSE UNSTRUCTURED FT
The patient appears stated age, dressed in hospital gowns, adequate grooming and hygiene. Cooperative, but gives limited answers to most questions with intermittent eye contact. No PMA/ PMR noted. The patient's speech is fluent but soft. Reported mood "OK" with constricted affect. Thought process grossly preservative. Thought content: less somatically preoccupied, denies current SI/HI. No perceptual disturbance appreciated. Insight and judgment are limited. Impulse control intact at the time of exam. Alert and oriented X4

## 2021-05-21 PROCEDURE — 99231 SBSQ HOSP IP/OBS SF/LOW 25: CPT

## 2021-05-21 RX ADMIN — RISPERIDONE 0.25 MILLIGRAM(S): 4 TABLET ORAL at 16:57

## 2021-05-21 RX ADMIN — FINASTERIDE 5 MILLIGRAM(S): 5 TABLET, FILM COATED ORAL at 09:53

## 2021-05-21 RX ADMIN — MIRTAZAPINE 30 MILLIGRAM(S): 45 TABLET, ORALLY DISINTEGRATING ORAL at 21:17

## 2021-05-21 RX ADMIN — PANTOPRAZOLE SODIUM 40 MILLIGRAM(S): 20 TABLET, DELAYED RELEASE ORAL at 09:51

## 2021-05-21 RX ADMIN — Medication 1 APPLICATION(S): at 09:52

## 2021-05-21 RX ADMIN — Medication 1 APPLICATION(S): at 21:48

## 2021-05-21 RX ADMIN — Medication 1000 UNIT(S): at 09:52

## 2021-05-21 RX ADMIN — MEMANTINE HYDROCHLORIDE 10 MILLIGRAM(S): 10 TABLET ORAL at 09:51

## 2021-05-21 RX ADMIN — Medication 1 MILLIGRAM(S): at 09:51

## 2021-05-21 RX ADMIN — AMLODIPINE BESYLATE 5 MILLIGRAM(S): 2.5 TABLET ORAL at 09:50

## 2021-05-21 RX ADMIN — MEMANTINE HYDROCHLORIDE 10 MILLIGRAM(S): 10 TABLET ORAL at 21:18

## 2021-05-21 RX ADMIN — RISPERIDONE 0.5 MILLIGRAM(S): 4 TABLET ORAL at 21:17

## 2021-05-21 NOTE — BH INPATIENT PSYCHIATRY PROGRESS NOTE - NSBHFUPINTERVALHXFT_PSY_A_CORE
Patient seen for depression and behavioral disturbance. Chart reviewed and case discussed with nursing staff. No overnight events. No PRNs. Pt moving bowels and stools well-formed. On encounter today, patient reports mood as "OK", states he eats fast and that sometimes that upsets his stomach, otherwise denies any denies any pain/discomfort. Patient advised to eat slowly. Denies dizziness. No hallucinations reported. Patient remains med compliant.     Patient seen for depression and behavioral disturbance. Chart reviewed and case discussed with nursing staff. No overnight events. No PRNs. Only concern staff have is that patient tends to hold off going to the bathroom. On encounter today, patient reports mood as "all right", denies any pain/discomfort. Denies dizziness. No hallucinations reported. Patient remains med compliant.

## 2021-05-21 NOTE — BH INPATIENT PSYCHIATRY PROGRESS NOTE - MSE UNSTRUCTURED FT
The patient appears stated age, dressed in hospital gowns, adequate grooming and hygiene. Cooperative, but gives limited answers to most questions with intermittent eye contact. No PMA/ PMR noted. The patient's speech is fluent but soft. Reported mood "OK" with constricted affect. Thought process grossly preservative. Thought content: less somatically preoccupied, denies current SI/HI. No perceptual disturbance appreciated. Insight and judgment are limited. Impulse control intact at the time of exam. Alert and oriented X4 The patient appears stated age, dressed in hospital gowns, adequate grooming and hygiene. Cooperative, but gives limited answers to most questions with intermittent eye contact. No PMA/ PMR noted. The patient's speech is fluent but soft. Reported mood "all right" with constricted affect. Thought process grossly preservative. Thought content: less somatically preoccupied, denies current SI/HI. No perceptual disturbance appreciated. Insight and judgment are limited. Impulse control intact at the time of exam. Alert and oriented X4

## 2021-05-21 NOTE — BH INPATIENT PSYCHIATRY PROGRESS NOTE - NSBHMETABOLIC_PSY_ALL_CORE_FT
BMI: BMI (kg/m2): 26.8 (05-08-21 @ 15:55)  HbA1c: A1C with Estimated Average Glucose Result: 5.5 % (03-23-21 @ 09:42)    Glucose:   BP: 127/66 (05-19-21 @ 11:00) (127/66 - 156/71)  Lipid Panel: Date/Time: 03-23-21 @ 09:42  Cholesterol, Serum: 172  Direct LDL: --  HDL Cholesterol, Serum: 60  Total Cholesterol/HDL Ration Measurement: --  Triglycerides, Serum: 60

## 2021-05-21 NOTE — BH INPATIENT PSYCHIATRY PROGRESS NOTE - NSBHASSESSSUMMFT_PSY_ALL_CORE
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  Recent neuropsych eval consistent with fronto-temporal dementia (FTD)  Patient in behavioral control. Taking meds. Repeat Hemoglobin-10.5 g/dL (10.4 on 05/19)  Plan: Will c/w Mirtazapine, Memantine and Risperidone at current dose. Will evaluate need for continuing ECT.  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  Recent neuropsych eval consistent with fronto-temporal dementia (FTD)  Patient in behavioral control. Taking meds. Repeat Hemoglobin-10.5 g/dL (10.4 on 05/19)  Plan: Will c/w Mirtazapine, Memantine and Risperidone at current dose. Will schedule next ECT on Monday 05/24/21.

## 2021-05-21 NOTE — BH INPATIENT PSYCHIATRY PROGRESS NOTE - NSBHCHARTREVIEWVS_PSY_A_CORE FT
Vital Signs Last 24 Hrs  T(C): 36.1 (21 May 2021 07:44), Max: 36.4 (20 May 2021 15:32)  T(F): 97 (21 May 2021 07:44), Max: 97.6 (20 May 2021 15:32)  HR: --  BP: --  BP(mean): --  RR: --  SpO2: -- Vital Signs Last 24 Hrs  T(C): 36.1 (21 May 2021 07:44), Max: 36.4 (20 May 2021 15:32)  T(F): 97 (21 May 2021 07:44), Max: 97.6 (20 May 2021 15:32)  Orthostatic VS  05-21-21 @ 07:44  Lying BP: 134/77 HR: 66   Sitting BP: 116/80 HR: 75

## 2021-05-22 RX ADMIN — FINASTERIDE 5 MILLIGRAM(S): 5 TABLET, FILM COATED ORAL at 08:59

## 2021-05-22 RX ADMIN — RISPERIDONE 0.25 MILLIGRAM(S): 4 TABLET ORAL at 17:56

## 2021-05-22 RX ADMIN — MEMANTINE HYDROCHLORIDE 10 MILLIGRAM(S): 10 TABLET ORAL at 20:11

## 2021-05-22 RX ADMIN — PANTOPRAZOLE SODIUM 40 MILLIGRAM(S): 20 TABLET, DELAYED RELEASE ORAL at 09:00

## 2021-05-22 RX ADMIN — RISPERIDONE 0.5 MILLIGRAM(S): 4 TABLET ORAL at 20:11

## 2021-05-22 RX ADMIN — RISPERIDONE 0.25 MILLIGRAM(S): 4 TABLET ORAL at 09:02

## 2021-05-22 RX ADMIN — MIRTAZAPINE 30 MILLIGRAM(S): 45 TABLET, ORALLY DISINTEGRATING ORAL at 21:04

## 2021-05-22 RX ADMIN — Medication 1000 UNIT(S): at 09:00

## 2021-05-22 RX ADMIN — MEMANTINE HYDROCHLORIDE 10 MILLIGRAM(S): 10 TABLET ORAL at 08:59

## 2021-05-22 RX ADMIN — Medication 1 MILLIGRAM(S): at 09:00

## 2021-05-22 RX ADMIN — AMLODIPINE BESYLATE 5 MILLIGRAM(S): 2.5 TABLET ORAL at 09:00

## 2021-05-23 RX ADMIN — Medication 1000 UNIT(S): at 08:37

## 2021-05-23 RX ADMIN — PANTOPRAZOLE SODIUM 40 MILLIGRAM(S): 20 TABLET, DELAYED RELEASE ORAL at 08:36

## 2021-05-23 RX ADMIN — AMLODIPINE BESYLATE 5 MILLIGRAM(S): 2.5 TABLET ORAL at 08:36

## 2021-05-23 RX ADMIN — RISPERIDONE 0.25 MILLIGRAM(S): 4 TABLET ORAL at 17:06

## 2021-05-23 RX ADMIN — MIRTAZAPINE 30 MILLIGRAM(S): 45 TABLET, ORALLY DISINTEGRATING ORAL at 20:41

## 2021-05-23 RX ADMIN — RISPERIDONE 0.5 MILLIGRAM(S): 4 TABLET ORAL at 20:41

## 2021-05-23 RX ADMIN — Medication 1 MILLIGRAM(S): at 08:37

## 2021-05-23 RX ADMIN — MEMANTINE HYDROCHLORIDE 10 MILLIGRAM(S): 10 TABLET ORAL at 20:41

## 2021-05-23 RX ADMIN — Medication 1 APPLICATION(S): at 22:03

## 2021-05-23 RX ADMIN — RISPERIDONE 0.25 MILLIGRAM(S): 4 TABLET ORAL at 08:36

## 2021-05-23 RX ADMIN — FINASTERIDE 5 MILLIGRAM(S): 5 TABLET, FILM COATED ORAL at 08:35

## 2021-05-23 RX ADMIN — MEMANTINE HYDROCHLORIDE 10 MILLIGRAM(S): 10 TABLET ORAL at 08:35

## 2021-05-24 VITALS — TEMPERATURE: 98 F

## 2021-05-24 PROCEDURE — 90870 ELECTROCONVULSIVE THERAPY: CPT

## 2021-05-24 PROCEDURE — 99238 HOSP IP/OBS DSCHRG MGMT 30/<: CPT | Mod: 25

## 2021-05-24 RX ORDER — CHOLECALCIFEROL (VITAMIN D3) 125 MCG
1000 CAPSULE ORAL
Qty: 0 | Refills: 0 | DISCHARGE
Start: 2021-05-24

## 2021-05-24 RX ORDER — RISPERIDONE 4 MG/1
1 TABLET ORAL
Qty: 0 | Refills: 0 | DISCHARGE
Start: 2021-05-24

## 2021-05-24 RX ORDER — FOLIC ACID 0.8 MG
1 TABLET ORAL
Qty: 0 | Refills: 0 | DISCHARGE
Start: 2021-05-24

## 2021-05-24 RX ORDER — MIRTAZAPINE 45 MG/1
1 TABLET, ORALLY DISINTEGRATING ORAL
Qty: 0 | Refills: 0 | DISCHARGE
Start: 2021-05-24

## 2021-05-24 RX ORDER — MEMANTINE HYDROCHLORIDE 10 MG/1
1 TABLET ORAL
Qty: 0 | Refills: 0 | DISCHARGE
Start: 2021-05-24

## 2021-05-24 RX ORDER — PANTOPRAZOLE SODIUM 20 MG/1
1 TABLET, DELAYED RELEASE ORAL
Qty: 0 | Refills: 0 | DISCHARGE
Start: 2021-05-24

## 2021-05-24 RX ORDER — FINASTERIDE 5 MG/1
1 TABLET, FILM COATED ORAL
Qty: 0 | Refills: 0 | DISCHARGE
Start: 2021-05-24

## 2021-05-24 RX ORDER — AMLODIPINE BESYLATE 2.5 MG/1
1 TABLET ORAL
Qty: 0 | Refills: 0 | DISCHARGE
Start: 2021-05-24

## 2021-05-24 RX ADMIN — RISPERIDONE 0.25 MILLIGRAM(S): 4 TABLET ORAL at 17:15

## 2021-05-24 NOTE — ECT TREATMENT NOTE - NSECTIMPPLAN_PSY_ALL_CORE
Assessment today offers no contraindications to continue plan of treatment with ECT.

## 2021-05-24 NOTE — BH INPATIENT PSYCHIATRY PROGRESS NOTE - NSTXCOPEDATETRGT_PSY_ALL_CORE
06-Apr-2021
21-Apr-2021
28-Apr-2021
25-May-2021
21-Apr-2021
14-Apr-2021
18-May-2021
12-May-2021
31-Mar-2021
12-May-2021
14-Apr-2021
24-Mar-2021
31-Mar-2021
06-Apr-2021
28-Apr-2021
31-Mar-2021
06-Apr-2021
28-Apr-2021
31-Mar-2021
25-May-2021
28-Apr-2021
05-May-2021
05-May-2021
06-Apr-2021
14-Apr-2021
18-May-2021
25-May-2021
05-May-2021
14-Apr-2021
21-Apr-2021
14-Apr-2021
18-May-2021
25-May-2021
12-May-2021
21-Apr-2021
24-Mar-2021
06-Apr-2021
24-Mar-2021
31-Mar-2021
24-Mar-2021
12-May-2021
05-May-2021
31-Mar-2021
12-May-2021
28-Apr-2021
05-May-2021
21-Apr-2021
18-May-2021
18-May-2021
31-Mar-2021

## 2021-05-24 NOTE — BH INPATIENT PSYCHIATRY PROGRESS NOTE - NS ED BHA REVIEW OF ED CHART VITAL SIGNS REVIEWED
Yes

## 2021-05-24 NOTE — ECT PRE-PROCEDURE CHECKLIST - NSMEDIMPLDEVICE_PSY_ALL_CORE
Right knee replacement/Metal Device

## 2021-05-24 NOTE — BH INPATIENT PSYCHIATRY PROGRESS NOTE - NSBHCONSULTIPREASON_PSY_A_CORE
danger to self; mental illness expected to respond to inpatient care

## 2021-05-24 NOTE — BH INPATIENT PSYCHIATRY PROGRESS NOTE - MSE OPTIONS
Unstructured MSE
Structured MSE
Unstructured MSE
Unstructured MSE
Structured MSE
Unstructured MSE

## 2021-05-24 NOTE — BH INPATIENT PSYCHIATRY PROGRESS NOTE - NSTXFALLDATEEST_PSY_ALL_CORE
18-Mar-2021
18-Apr-2021
18-Apr-2021
18-Mar-2021
19-Mar-2021
18-Mar-2021
19-Mar-2021
18-Mar-2021
18-Apr-2021
18-Mar-2021
19-Mar-2021
18-Apr-2021
19-Mar-2021
18-Mar-2021
19-Mar-2021
19-Mar-2021
18-Apr-2021
19-Mar-2021
18-Apr-2021
19-Mar-2021
18-Apr-2021
19-Mar-2021
19-Mar-2021
18-Mar-2021
19-Mar-2021
19-Mar-2021
18-Mar-2021
19-Mar-2021
18-Apr-2021
18-Apr-2021
19-Mar-2021
18-Mar-2021
19-Mar-2021
19-Mar-2021
18-Mar-2021
18-Apr-2021
18-Mar-2021

## 2021-05-24 NOTE — BH INPATIENT PSYCHIATRY PROGRESS NOTE - NSTXDCOTHRGOAL_PSY_ALL_CORE
patient will comply with treatment in order to improve mental status and be able to effectively engage with dc planning.
patient will comply with treatment in order to improve mental status and be able to effectively engage with dc planning.
patient will comply with treatment in order to improve mental status and be able to effectively engage with discharge planning.
patient will participate in treatment for improved mental status and ability to effectively engage with discharge planning.
patient will comply with treatment in order to improve mental status and be able to effectively engage with discharge planning.
patient will participate in treatment in order to improve mental status and effectively engage with discharge planning.
patient will participate in treatment in order to improve mental status and effectively engage with discharge planning. patient will accept placement in SNF as planned.
patient will comply with treatment to improve mental status in order to be able to engage effectively with discharge planning.
patient will comply with treatment to improve mental status in order to be able to engage effectively with discharge planning.
patient will comply with treatment in order to improve mental status and be able to effectively engage with dc planning.
patient will comply with treatment to improve mental status in order to be able to engage effectively with discharge planning.
patient will participate in treatment for improved mental status and ability to effectively engage with discharge planning.
patient will comply with treatment in order to improve mental status and be able to effectively engage with discharge planning.
patient will participate in treatment in order to improve mental status and effectively engage with discharge planning. patient will accept placement in SNF as planned.
Complex. Noncompliant patient will comply with treatment and improve mental status in order to engage effectively with discharge planning
patient will participate in treatment in order to improve mental status and effectively engage with discharge planning.
Complex. Noncompliant patient will comply with treatment and improve mental status in order to engage effectively with discharge planning
Complex. Noncompliant patient will comply with treatment and improve mental status in order to engage effectively with discharge planning
patient will comply with treatment in order to improve mental status and be able to effectively engage with discharge planning.
patient will comply with treatment to improve mental status in order to be able to engage effectively with discharge planning.
patient will comply with treatment and improve mental status in order to effectively engage with dc plan for snf placement.
patient will comply with treatment to improve mental status in order to be able to engage effectively with discharge planning.
patient will comply with treatment in order to improve mental status and be able to effectively engage with discharge planning.
Complex. Noncompliant patient will comply with treatment and improve mental status in order to engage effectively with discharge planning
patient will participate in treatment in order to improve mental status and effectively engage with discharge planning.
patient will participate in treatment for improved mental status and ability to effectively engage with discharge planning.
patient will participate in treatment in order to improve mental status and effectively engage with discharge planning.
patient will participate in treatment in order to improve mental status and effectively engage with discharge planning. patient will accept placement in SNF as planned.
patient will comply with treatment to improve mental status in order to be able to engage effectively with discharge planning.
patient will participate in treatment in order to improve mental status and effectively engage with discharge planning.
Complex. Noncompliant patient will comply with treatment and improve mental status in order to engage effectively with discharge planning
Complex. Noncompliant patient will comply with treatment and improve mental status in order to engage effectively with discharge planning
patient will comply with treatment in order to improve mental status and be able to effectively engage with discharge planning.
patient will continue to comply with treatment and be able to engage with discharge planning. Placement SNF is planned.
patient will participate in treatment in order to improve mental status and effectively engage with discharge planning.
patient will comply with treatment to improve mental status in order to be able to engage effectively with discharge planning.
Complex. Noncompliant patient will comply with treatment and improve mental status in order to engage effectively with discharge planning
patient will continue to comply with treatment and be able to engage with discharge planning. Placement SNF is planned.
patient will continue to comply with treatment and be able to engage with discharge planning. Placement SNF is planned.
patient will comply with treatment and improve mental status in order to effectively engage with dc plan for snf placement.
patient will comply with treatment in order to improve mental status and be able to effectively engage with dc planning.
patient will comply with treatment in order to improve mental status and be able to effectively engage with discharge planning.
patient will participate in treatment in order to improve mental status and effectively engage with discharge planning. patient will accept placement in SNF as planned.
patient will continue to comply with treatment and be able to engage with discharge planning. Placement SNF is planned.
patient will comply with treatment and improve mental status in order to effectively engage with dc plan for snf placement.
patient will participate in treatment in order to improve mental status and effectively engage with discharge planning. patient will accept placement in SNF as planned.

## 2021-05-24 NOTE — ECT PRE-PROCEDURE CHECKLIST - TO WHOM
procedural room RN
procedure room rn 
procedure room rn 
procedural room RN
procedural room RN
procedure room rn 
procedural room RN

## 2021-05-24 NOTE — ECT PRE-PROCEDURE CHECKLIST - NSPROEDALEARNPREF_GEN_A_NUR
skill demonstration/verbal instruction

## 2021-05-24 NOTE — BH INPATIENT PSYCHIATRY PROGRESS NOTE - NSBHPROGSUIC_PSY_A_CORE
no

## 2021-05-24 NOTE — ECT TREATMENT NOTE - NSECTPOSTTXSUMMARY_PSY_ALL_CORE
The patient had a well modified grand mal seizure under general anesthesia and muscle relaxant. The patient is alert, responsive, in no acute distress.  Recovery uneventful.

## 2021-05-24 NOTE — BH INPATIENT PSYCHIATRY PROGRESS NOTE - NSBHATTESTSEENBY_PSY_A_CORE
attending Psychiatrist without NP/Trainee
Attending Psychiatrist supervising NP/Trainee, meeting pt...
attending Psychiatrist without NP/Trainee
Attending Psychiatrist supervising NP/Trainee, meeting pt...
attending Psychiatrist without NP/Trainee
Attending Psychiatrist supervising NP/Trainee, meeting pt...
attending Psychiatrist without NP/Trainee
Attending Psychiatrist supervising NP/Trainee, meeting pt...
attending Psychiatrist without NP/Trainee
Attending Psychiatrist supervising NP/Trainee, meeting pt...
attending Psychiatrist without NP/Trainee
Attending Psychiatrist supervising NP/Trainee, meeting pt...
attending Psychiatrist without NP/Trainee

## 2021-05-24 NOTE — BH INPATIENT PSYCHIATRY PROGRESS NOTE - NSTXCOPEGOAL_PSY_ALL_CORE
Identify and utilize 2 coping skills that meet their needs
Other...
Identify and utilize 2 coping skills that meet their needs
Other...
Identify and utilize 2 coping skills that meet their needs
Other...
Identify and utilize 2 coping skills that meet their needs
Other...
Identify and utilize 2 coping skills that meet their needs

## 2021-05-24 NOTE — BH INPATIENT PSYCHIATRY PROGRESS NOTE - NSTXFALLINTERMD_PSY_ALL_CORE
Will monitor for postural hypotension. 
Will monitor for postural hypotension. PT consult
Will monitor for postural hypotension. 
PT consult.
PT consult.
PT consult. Will monitor for postural hypotension. 
Will monitor for postural hypotension. 
PT consult. Will monitor for postural hypotension. 
Will monitor for postural hypotension. 
Will monitor for postural hypotension. 
PT consult.
Will monitor for postural hypotension. 
PT consult.
Will monitor for postural hypotension. 
PT consult.
PT consult. Will monitor for postural hypotension. 
Will monitor for postural hypotension. PT consult
PT consult. Will monitor for postural hypotension. 
Will monitor for postural hypotension. 
PT consult.
PT consult.
PT consult. Will monitor for postural hypotension. 
PT consult. Will monitor for postural hypotension. 
Will monitor for postural hypotension. 
PT consult. Will monitor for postural hypotension. 
Will monitor for postural hypotension. PT consult
PT consult.
Will monitor for postural hypotension. 
Will monitor for postural hypotension. 
PT consult.
PT consult.
PT consult. Will monitor for postural hypotension. 
Will monitor for postural hypotension. 
Will monitor for postural hypotension. PT consult
Will monitor for postural hypotension. 
Will monitor for postural hypotension. PT consult
PT consult.
Will monitor for postural hypotension. 
Will monitor for postural hypotension. 
PT consult. Will monitor for postural hypotension. 
Will monitor for postural hypotension. 
PT consult. Will monitor for postural hypotension. 
Will monitor for postural hypotension. 
Will monitor for postural hypotension. PT consult
PT consult.
Will monitor for postural hypotension. PT consult

## 2021-05-24 NOTE — BH INPATIENT PSYCHIATRY PROGRESS NOTE - NSTXHEALTHGOAL_PSY_ALL_CORE
Will be able to identify 3 healthy lifestyle choices such as medication adherence, obtaining primary care and wellness activities
Other...
Will be able to identify 3 healthy lifestyle choices such as medication adherence, obtaining primary care and wellness activities
Other...
Will be able to identify 3 healthy lifestyle choices such as medication adherence, obtaining primary care and wellness activities
Other...
Will be able to identify 3 healthy lifestyle choices such as medication adherence, obtaining primary care and wellness activities
Other...
Will be able to identify 3 healthy lifestyle choices such as medication adherence, obtaining primary care and wellness activities

## 2021-05-24 NOTE — BH INPATIENT PSYCHIATRY PROGRESS NOTE - NSCGIIMPROVESX_PSY_ALL_CORE

## 2021-05-24 NOTE — ECT PRE-PROCEDURE CHECKLIST - NSPROEDAREADYLEARN_GEN_A_NUR
anxiety/depression/lack of motivation

## 2021-05-24 NOTE — BH INPATIENT PSYCHIATRY PROGRESS NOTE - NSTXDCOTHRPROGRES_PSY_ALL_CORE
Improving
No Change
Improving
No Change
No Change
Improving
No Change
Improving
No Change
Improving
No Change
Improving
Improving
No Change
Improving
No Change
Improving
Improving
No Change
Improving
No Change
Improving
No Change
Improving
No Change
Improving

## 2021-05-24 NOTE — BH INPATIENT PSYCHIATRY PROGRESS NOTE - NSTXHEALTHPROGRES_PSY_ALL_CORE
Improving

## 2021-05-24 NOTE — ECT PRE-PROCEDURE CHECKLIST - NSPROEDAREADYLEARNOTH_GEN_A_NUR
depression/lack of motivation/pain

## 2021-05-24 NOTE — ECT PRE-PROCEDURE CHECKLIST - NSPROEDAABILITYLEARN_GEN_A_NUR
anxiety/physical limitations/visual problems

## 2021-05-24 NOTE — BH DISCHARGE NOTE NURSING/SOCIAL WORK/PSYCH REHAB - PATIENT PORTAL LINK FT
You can access the FollowMyHealth Patient Portal offered by [facility] by registering at the following website: [email or web address]. By joining [facility]’s FollowMyHealth portal, you will also be able to view your health information using other applications (apps) compatible with our system.

## 2021-05-24 NOTE — BH INPATIENT PSYCHIATRY PROGRESS NOTE - NSTXCOPEINTERMD_PSY_ALL_CORE
Will encourage patient to participate in therapy groups to learn coping skills. 

## 2021-05-24 NOTE — BH INPATIENT PSYCHIATRY PROGRESS NOTE - NSTXDCOTHRDATEEST_PSY_ALL_CORE
19-Mar-2021

## 2021-05-24 NOTE — BH INPATIENT PSYCHIATRY PROGRESS NOTE - NSTXDEPRESDATENEW_PSY_ALL_CORE
16-Apr-2021

## 2021-05-24 NOTE — BH INPATIENT PSYCHIATRY PROGRESS NOTE - NSTXPROBDEPRES_PSY_ALL_CORE
DEPRESSIVE SYMPTOMS

## 2021-05-24 NOTE — BH INPATIENT PSYCHIATRY PROGRESS NOTE - TELEPSYCHIATRY?
No

## 2021-05-24 NOTE — BH INPATIENT PSYCHIATRY PROGRESS NOTE - NSTXPROBHEALTH_PSY_ALL_CORE
HEALTH MAINTENANCE, INEFFECTIVE

## 2021-05-24 NOTE — ECT PRE-PROCEDURE CHECKLIST - ALLERGY BAND ON
no known allergies

## 2021-05-24 NOTE — BH INPATIENT PSYCHIATRY PROGRESS NOTE - NSTXPROBFALL_PSY_ALL_CORE
FALL RISK

## 2021-05-24 NOTE — BH INPATIENT PSYCHIATRY PROGRESS NOTE - NSTXCONFINTERMD_PSY_ALL_CORE
Will continue Memantine 
Will initiate Memantine and titrate dose as tolerated
Will continue Memantine 
Will initiate Memantine and titrate dose as tolerated
Will continue Memantine 
Will initiate Memantine and titrate dose as tolerated
Will continue Memantine 
Will initiate Memantine and titrate dose as tolerated
Will continue Memantine 
Will initiate Memantine and titrate dose as tolerated
Will continue Memantine 

## 2021-05-24 NOTE — BH INPATIENT PSYCHIATRY PROGRESS NOTE - NSTXDEPRESGOALOTHER_PSY_ALL_CORE
Pt will identify and utilize at least 2 coping skills to address sadness, anxiety, and negative ruminations for improved symptom management
Pt will identify and utilize at least 2 coping skills to address sadness, anxiety, and negative ruminations for improved symptom management
Pt will identify and utilize at least 2 coping skills to address sadness, anxiety, and negative ruminations for improved symptom management.
Pt will identify and utilize at least 2 coping skills to address sadness, anxiety, and negative ruminations for improved symptom management
Pt will identify and utilize at least 2 coping skills to address sadness, anxiety, and negative ruminations for improved symptom management.
Pt will identify and utilize at least 2 coping skills to address sadness, anxiety, and negative ruminations for improved symptom management
Pt will identify and utilize at least 2 coping skills to address sadness, anxiety, and negative ruminations for improved symptom management
Pt will identify and utilize at least 2 coping skills to address sadness, anxiety, and negative ruminations for improved symptom management.
Pt will identify and utilize at least 2 coping skills to address sadness, anxiety, and negative ruminations for improved symptom management.
Pt will identify and utilize at least 2 coping skills to address sadness, anxiety, and negative ruminations for improved symptom management
Pt will identify and utilize at least 2 coping skills to address sadness, anxiety, and negative ruminations for improved symptom management.
Pt will identify and utilize at least 2 coping skills to address sadness, anxiety, and negative ruminations for improved symptom management.
Pt will identify and utilize at least 2 coping skills to address sadness, anxiety, and negative ruminations for improved symptom management
Pt will identify and utilize at least 2 coping skills to address sadness, anxiety, and negative ruminations for improved symptom management.
Pt will identify and utilize at least 2 coping skills to address sadness, anxiety, and negative ruminations for improved symptom management.
Pt will identify and utilize at least 2 coping skills to address sadness, anxiety, and negative ruminations for improved symptom management
Pt will identify and utilize at least 2 coping skills to address sadness, anxiety, and negative ruminations for improved symptom management.

## 2021-05-24 NOTE — BH DISCHARGE NOTE NURSING/SOCIAL WORK/PSYCH REHAB - NSDCVIVACCINE_GEN_ALL_CORE_FT
Tdap , 2017/3/27 00:18 , Roosevelt Vargas (PIYUSH)  Tdap , 2021/3/18 18:16 , Domonique Hooper (PIYUSH)

## 2021-05-24 NOTE — ECT TREATMENT NOTE - NSECTCPTCODE_PSY_ALL_CORE
81264 (ECT-Electroconvulsive Therapy)
04751 (ECT-Electroconvulsive Therapy)
46436 (ECT-Electroconvulsive Therapy)
41476 (ECT-Electroconvulsive Therapy)
26835 (ECT-Electroconvulsive Therapy)
82813 (ECT-Electroconvulsive Therapy)
97110 (ECT-Electroconvulsive Therapy)

## 2021-05-24 NOTE — BH INPATIENT PSYCHIATRY PROGRESS NOTE - NSTXPROBCOPE_PSY_ALL_CORE
COPING, INEFFECTIVE

## 2021-05-24 NOTE — BH INPATIENT PSYCHIATRY PROGRESS NOTE - NSTXFALLDATETRGT_PSY_ALL_CORE
12-May-2021
31-Mar-2021
14-Apr-2021
31-Mar-2021
31-Mar-2021
28-Apr-2021
06-Apr-2021
05-May-2021
14-Apr-2021
05-May-2021
14-Apr-2021
31-Mar-2021
05-May-2021
28-Apr-2021
06-Apr-2021
18-May-2021
06-Apr-2021
18-May-2021
12-May-2021
14-Apr-2021
24-Mar-2021
28-Apr-2021
06-Apr-2021
21-Apr-2021
21-Apr-2021
24-Mar-2021
12-May-2021
28-Apr-2021
06-Apr-2021
31-Mar-2021
25-May-2021
24-Mar-2021
21-Apr-2021
18-May-2021
28-Apr-2021
12-May-2021
31-Mar-2021
12-May-2021
31-Mar-2021
05-May-2021
24-Mar-2021
25-May-2021
05-May-2021
21-Apr-2021
18-May-2021
25-May-2021
14-Apr-2021
25-May-2021
18-May-2021
21-Apr-2021

## 2021-05-24 NOTE — ECT TREATMENT NOTE - NSECTREVSYS_PSY_ALL_CORE
Cardiovascular/Abdominal/Metabolic/Other

## 2021-05-24 NOTE — BH INPATIENT PSYCHIATRY PROGRESS NOTE - NSTXHEALTHINTERMD_PSY_ALL_CORE
Routine health evaluation
Routine health maintenance
Routine health maintenance
Routine health evaluation
Routine health maintenance
Routine health evaluation
Routine health evaluation
Routine health maintenance
Routine health evaluation
Routine health maintenance
Routine health evaluation
Routine health maintenance
Routine health evaluation
Routine health maintenance
Routine health evaluation
Routine health maintenance
Routine health evaluation
Routine health maintenance
Routine health evaluation
Routine health maintenance
Routine health evaluation
Routine health maintenance
Routine health evaluation
Routine health maintenance
Routine health evaluation

## 2021-05-24 NOTE — BH INPATIENT PSYCHIATRY PROGRESS NOTE - NSBHCONTPROVIDER_PSY_ALL_CORE
Not applicable

## 2021-05-24 NOTE — BH INPATIENT PSYCHIATRY PROGRESS NOTE - NSDCCRITERIA_PSY_ALL_CORE
50% improvement in mood and agitation. Patient will deny any SI/HI. CGI < 3

## 2021-05-24 NOTE — BH INPATIENT PSYCHIATRY PROGRESS NOTE - NSBHASSESSSUMMFT_PSY_ALL_CORE
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  Recent neuropsych eval consistent with fronto-temporal dementia (FTD)  Patient in behavioral control. Taking meds. Per staff, appetite and sleep fair.  Plan: Will c/w Mirtazapine, Memantine and Risperidone at current dose. Patient scheduled for next ECT this morning.

## 2021-05-24 NOTE — ECT PRE-PROCEDURE CHECKLIST - NSPROEDALEARNPREFOTH_GEN_A_NUR
individual instruction/skill demonstration/verbal instruction

## 2021-05-24 NOTE — ECT PRE-PROCEDURE CHECKLIST - NSPROPTRIGHTSUPPORTNAME_GEN_A_NUR
Adelina Barry

## 2021-05-24 NOTE — BH INPATIENT PSYCHIATRY PROGRESS NOTE - NSTXDCOTHRINTERMD_PSY_ALL_CORE
Patient and family requesting discharge to SNF.
Patient and family considering discharge to SNF.
Patient and family requesting discharge to SNF.
Patient and family considering discharge to SNF.
Patient and family requesting discharge to SNF.
Patient and family considering discharge to SNF.
Patient and family requesting discharge to SNF.
Patient and family considering discharge to SNF.
Patient and family considering discharge to SNF.
Patient and family requesting discharge to SNF.
Patient and family considering discharge to SNF.
Patient and family requesting discharge to SNF.
Patient and family considering discharge to SNF.
Patient and family requesting discharge to SNF.

## 2021-05-24 NOTE — BH DISCHARGE NOTE NURSING/SOCIAL WORK/PSYCH REHAB - NSCDUDCCRISIS_PSY_A_CORE
.National Suicide Prevention Lifeline 8 (611) 157-6390 [facility] hospital Well  1 (779) [facility]iatric hospital-WELL (916-9229)  Text "WELL" to 76989  Website: www.[employer]Flash/.[employer] 1 (138) 131-UOEI (9266) Website: www.safehorizon.org/.National Suicide Prevention Lifeline 8 (544) 785-4478/.  [employer]  1 (949) LIFENET (763-3758)/.  [facility]  [address] [place] [address] [phone]   Hours:  Monday through Friday from 9 AM to 3 PM/.  [employer] [facility] Crisis Line  2 (339) 054-9967, Option 1

## 2021-05-24 NOTE — BH INPATIENT PSYCHIATRY PROGRESS NOTE - NSTXHEALTHDATETRGT_PSY_ALL_CORE
14-Apr-2021
31-Mar-2021
12-May-2021
12-May-2021
21-Apr-2021
24-Mar-2021
31-Mar-2021
18-Apr-2021
05-May-2021
31-Mar-2021
31-Mar-2021
14-Apr-2021
31-Mar-2021
05-May-2021
12-May-2021
14-Apr-2021
14-Apr-2021
24-Mar-2021
18-Apr-2021
21-Apr-2021
12-May-2021
31-Mar-2021
31-Mar-2021
24-Mar-2021
31-Mar-2021
12-May-2021
12-May-2021
31-Mar-2021
21-Apr-2021
14-Apr-2021
18-Apr-2021
18-Apr-2021
25-May-2021
21-Apr-2021
05-May-2021
31-Mar-2021
05-May-2021
12-May-2021
31-Mar-2021
25-May-2021
24-Mar-2021
12-May-2021
21-Apr-2021
31-Mar-2021
12-May-2021
25-May-2021
05-May-2021
12-May-2021
25-May-2021
18-Apr-2021

## 2021-05-24 NOTE — BH INPATIENT PSYCHIATRY PROGRESS NOTE - NSTXCOPEDATEEST_PSY_ALL_CORE
18-Mar-2021
18-Mar-2021
19-Mar-2021
18-Mar-2021
19-Mar-2021
18-Apr-2021
19-Mar-2021
18-Apr-2021
18-Mar-2021
18-Mar-2021
19-Mar-2021
19-Mar-2021
18-Mar-2021
19-Mar-2021
18-Mar-2021
18-Apr-2021
18-Mar-2021
19-Mar-2021
18-Mar-2021
18-Apr-2021
18-Mar-2021
19-Mar-2021
18-Apr-2021
18-Mar-2021
18-Mar-2021
19-Mar-2021
18-Mar-2021
18-Mar-2021
19-Mar-2021
18-Apr-2021
18-Mar-2021
19-Mar-2021
18-Apr-2021
18-Mar-2021
19-Mar-2021
19-Mar-2021
18-Mar-2021
18-Mar-2021
19-Mar-2021
18-Apr-2021
19-Mar-2021
18-Apr-2021
18-Mar-2021
19-Mar-2021
19-Mar-2021
18-Apr-2021
19-Mar-2021
18-Mar-2021

## 2021-05-24 NOTE — BH DISCHARGE NOTE NURSING/SOCIAL WORK/PSYCH REHAB - NSDCPRGOAL_PSY_ALL_CORE
Pt has demonstrated some progress towards psychiatric rehabilitation goals during the current hospitalization. Pt was able to identify reading as a healthy coping strategies to better manage symptoms. Pt has been increasingly more receptive to skill development throughout hospitalization. Pt has been consistently compliant with standing medications/ECT treatments, although endorses minimal improvements in regards to symptoms. Pt is somewhat less internally preoccupied. Pt continues to struggle with feces incontinence and eating too quickly. Staff reports that at times it was difficult to redirect pt during meal times. Pt is increasingly receptive to redirection. Writer and pt engaged in safety planning which can be reviewed in the  Safety Plan document. Pt denies any current AH/VH/SI/HI at this time. Due to the COVID-19 pandemic, unit structure and activities are re-evaluated on a consistent basis in effort to maintain the safety of pts and staff. Pt has been increasingly receptive to group activities throughout hospitalization. Pt has attended approximately 70% of daily psychiatric rehabilitation groups during the current hospitalization and demonstrated an increase in participation as his symptoms improved. Pt engaged in various groups including, coping skills, peer advocate groups, and creative arts therapy groups. Pt was visible on the unit and demonstrated positive socialization with select peers. Pt was unable to provide a CGI self-rating despite prompting. Pt was provided with a [employer] survey to complete prior to discharge.

## 2021-05-24 NOTE — BH INPATIENT PSYCHIATRY PROGRESS NOTE - NSTXDEPRESPROGRES_PSY_ALL_CORE
Improving
Improving
No Change
Improving
Improving
Met - goal discontinued
Improving
No Change
Improving
No Change
Improving
No Change
Improving
No Change
Improving
Met - goal discontinued
No Change
Improving
No Change
Improving
Improving
No Change
Improving

## 2021-05-24 NOTE — ECT TREATMENT NOTE - NSICDXBHSECONDARYDX_PSY_ALL_CORE
Unsteadiness   R26.81  Frontotemporal dementia   G31.09  

## 2021-05-24 NOTE — ECT PRE-PROCEDURE CHECKLIST - PATIENT REPRESENTATIVE: ( YOU CAN CHOOSE ANY PERSON THAT CAN ASSIST YOU WITH YOUR HEALTH CARE PREFERENCES, DOES NOT HAVE TO BE A SPOUSE, IMMEDIATE FAMILY OR SIGNIFICANT OTHER/PARTNER)
declines
same name as above
declines
same name as above
declines
same name as above
same name as above

## 2021-05-24 NOTE — BH INPATIENT PSYCHIATRY PROGRESS NOTE - NSBHCHARTREVIEWINVESTIGATE_PSY_A_CORE FT
EXAM:  MR BRAIN    PROCEDURE DATE:  Apr 15 2021   FINDINGS:  There are scattered few T2/FLAIR hyperintense foci in the subcortical and periventricular white matter, nonspecific finding, but most representing sequelae of mild chronic microvascular ischemic disease. There is cortical sulcal prominence related to underlying brain parenchymal volume loss.  No acute infarction, intracranial hemorrhage or mass.  The ventricles are normal without evidence of hydrocephalus. There are no extra-axial fluid collections. The skull base flow voids are present.  The patient is status post right lens replacement and scleral banding. The imaged portions of the paranasal sinuses are aerated. The mastoid air cells are clear. The visualized soft tissues and osseous structures appear unremarkable.  IMPRESSION:  Mild chronic microvascular ischemic disease.  
reviewed
reviewed
EXAM:  MR BRAIN    PROCEDURE DATE:  Apr 15 2021   FINDINGS:  There are scattered few T2/FLAIR hyperintense foci in the subcortical and periventricular white matter, nonspecific finding, but most representing sequelae of mild chronic microvascular ischemic disease. There is cortical sulcal prominence related to underlying brain parenchymal volume loss.  No acute infarction, intracranial hemorrhage or mass.  The ventricles are normal without evidence of hydrocephalus. There are no extra-axial fluid collections. The skull base flow voids are present.  The patient is status post right lens replacement and scleral banding. The imaged portions of the paranasal sinuses are aerated. The mastoid air cells are clear. The visualized soft tissues and osseous structures appear unremarkable.  IMPRESSION:  Mild chronic microvascular ischemic disease.  

## 2021-05-24 NOTE — BH INPATIENT PSYCHIATRY PROGRESS NOTE - NS ED BHA MED ROS PSYCHIATRIC
See HPI

## 2021-05-24 NOTE — ECT TREATMENT NOTE - NSECTPTEVAL_PSY_ALL_CORE
Patient evaluated and History and Physical reviewed prior to ECT. There are no significant changes to the patient's condition unless specified.

## 2021-05-24 NOTE — BH INPATIENT PSYCHIATRY PROGRESS NOTE - NSBHADMITMEDEDUDETAILS_PSY_A_CORE FT
Discussed with patient

## 2021-05-24 NOTE — ECT PRE-PROCEDURE CHECKLIST - ALLERGIES
Allergies:-  penicillins      

## 2021-05-24 NOTE — BH INPATIENT PSYCHIATRY PROGRESS NOTE - NSCGISEVERILLNESS_PSY_ALL_CORE
5 = Markedly ill - intrusive symptoms that distinctly impair social/occupational function or cause intrusive levels of distress
6 = Severely ill - disruptive pathology, behavior and function are frequently influenced by symptoms, may require assistance from others
5 = Markedly ill - intrusive symptoms that distinctly impair social/occupational function or cause intrusive levels of distress
6 = Severely ill - disruptive pathology, behavior and function are frequently influenced by symptoms, may require assistance from others
4 = Moderately ill – overt symptoms causing noticeable, but modest, functional impairment or distress; symptom level may warrant medication
4 = Moderately ill – overt symptoms causing noticeable, but modest, functional impairment or distress; symptom level may warrant medication
5 = Markedly ill - intrusive symptoms that distinctly impair social/occupational function or cause intrusive levels of distress
6 = Severely ill - disruptive pathology, behavior and function are frequently influenced by symptoms, may require assistance from others
5 = Markedly ill - intrusive symptoms that distinctly impair social/occupational function or cause intrusive levels of distress
4 = Moderately ill – overt symptoms causing noticeable, but modest, functional impairment or distress; symptom level may warrant medication
6 = Severely ill - disruptive pathology, behavior and function are frequently influenced by symptoms, may require assistance from others
5 = Markedly ill - intrusive symptoms that distinctly impair social/occupational function or cause intrusive levels of distress
4 = Moderately ill – overt symptoms causing noticeable, but modest, functional impairment or distress; symptom level may warrant medication
6 = Severely ill - disruptive pathology, behavior and function are frequently influenced by symptoms, may require assistance from others
6 = Severely ill - disruptive pathology, behavior and function are frequently influenced by symptoms, may require assistance from others
4 = Moderately ill – overt symptoms causing noticeable, but modest, functional impairment or distress; symptom level may warrant medication
5 = Markedly ill - intrusive symptoms that distinctly impair social/occupational function or cause intrusive levels of distress

## 2021-05-24 NOTE — BH INPATIENT PSYCHIATRY PROGRESS NOTE - NSTXCONFDATETRGT_PSY_ALL_CORE
21-Apr-2021
18-May-2021
21-Apr-2021
05-May-2021
12-May-2021
12-May-2021
28-Apr-2021
21-Apr-2021
12-May-2021
21-Apr-2021
12-May-2021
18-May-2021
21-Apr-2021
12-May-2021
25-May-2021
28-Apr-2021
18-May-2021
18-May-2021
28-Apr-2021
05-May-2021
28-Apr-2021
25-May-2021
18-May-2021
28-Apr-2021
05-May-2021
25-May-2021
25-May-2021
05-May-2021
05-May-2021

## 2021-05-24 NOTE — ECT PRE-PROCEDURE CHECKLIST - NSMEDSDOSETAKEN_PSY_ALL_CORE
no meds today
see EMAR for meds taken

## 2021-05-24 NOTE — ECT TREATMENT NOTE - NSECTABDMETACOMMENT_PSY_ALL_CORE
GERD, Vitamin D deficiency

## 2021-05-24 NOTE — BH DISCHARGE NOTE NURSING/SOCIAL WORK/PSYCH REHAB - DISCHARGE INSTRUCTIONS AFTERCARE APPOINTMENTS
In order to check the location, date, or time of your aftercare appointment, please refer to your Discharge Instructions Document given to you upon leaving the hospital.  If you have lost the instructions please call [phone]

## 2021-05-24 NOTE — BH INPATIENT PSYCHIATRY PROGRESS NOTE - NSBHMETABOLIC_PSY_ALL_CORE_FT
BMI: BMI (kg/m2): 26.8 (05-08-21 @ 15:55)  HbA1c: A1C with Estimated Average Glucose Result: 5.5 % (03-23-21 @ 09:42)  Lipid Panel: Date/Time: 03-23-21 @ 09:42  Cholesterol, Serum: 172  HDL Cholesterol, Serum: 60  Triglycerides, Serum: 60

## 2021-05-24 NOTE — ECT TREATMENT NOTE - NSICDXBHPRIMARYDX_PSY_ALL_CORE
MDD (major depressive disorder), single episode, severe with psychotic features   F32.3  

## 2021-05-24 NOTE — BH INPATIENT PSYCHIATRY PROGRESS NOTE - NSTXDEPRESDATEEST_PSY_ALL_CORE
09-Apr-2021
18-Apr-2021
18-Mar-2021
09-Apr-2021
18-Mar-2021
19-Mar-2021
09-Apr-2021
18-Mar-2021
09-Apr-2021
18-Mar-2021
18-Mar-2021
09-Apr-2021
19-Mar-2021
19-Mar-2021
09-Apr-2021
18-Mar-2021
19-Mar-2021
18-Mar-2021
19-Mar-2021
18-Mar-2021
19-Mar-2021
09-Apr-2021
19-Mar-2021
18-Mar-2021
09-Apr-2021
18-Mar-2021
19-Mar-2021
19-Mar-2021
09-Apr-2021
18-Mar-2021
19-Mar-2021
09-Apr-2021
19-Mar-2021
18-Apr-2021
09-Apr-2021
19-Mar-2021
18-Mar-2021
18-Mar-2021
09-Apr-2021
19-Mar-2021
18-Apr-2021
09-Apr-2021
09-Apr-2021

## 2021-05-24 NOTE — BH INPATIENT PSYCHIATRY PROGRESS NOTE - NSTXPROBDCOTHR_PSY_ALL_CORE
DISCHARGE ISSUE - OTHER

## 2021-05-24 NOTE — BH INPATIENT PSYCHIATRY PROGRESS NOTE - NSBHFUPINTERVALHXFT_PSY_A_CORE
Patient seen for depression and behavioral disturbance. Chart reviewed and case discussed with nursing staff. No significant events over the weekend. No PRNs. Patient seen prior to ECT today. On encounter today, patient reports mood as "OK", reports he is hungry and patient reassured that he can eat after ECT today. Patient denies any pain/discomfort. Denies dizziness. No hallucinations reported. Patient remains med compliant.

## 2021-05-24 NOTE — BH INPATIENT PSYCHIATRY PROGRESS NOTE - NSBHCHARTREVIEWVS_PSY_A_CORE FT
Vital Signs Last 24 Hrs  T(C): 37.1 (24 May 2021 06:05), Max: 37.1 (23 May 2021 15:49)  T(F): 98.8 (24 May 2021 06:05), Max: 98.8 (23 May 2021 15:49)  Orthostatic VS  05-24-21 @ 06:05  Lying BP: 121/71 HR: 67   Sitting BP: 116/73 HR: 64

## 2021-05-24 NOTE — BH INPATIENT PSYCHIATRY PROGRESS NOTE - NSTXHEALTHDATEEST_PSY_ALL_CORE
19-Mar-2021
18-Mar-2021
19-Mar-2021
18-Apr-2021
19-Mar-2021
19-Mar-2021
18-Mar-2021
18-Mar-2021
19-Mar-2021
18-Mar-2021
18-Mar-2021
19-Mar-2021
18-Mar-2021
19-Mar-2021
18-Mar-2021
18-Mar-2021
19-Mar-2021
18-Apr-2021
19-Mar-2021
18-Mar-2021
18-Mar-2021
19-Mar-2021
18-Apr-2021
19-Mar-2021
18-Mar-2021
18-Apr-2021
19-Mar-2021
19-Mar-2021
18-Apr-2021
19-Mar-2021
18-Mar-2021
18-Mar-2021
18-Apr-2021
18-Mar-2021
19-Mar-2021
18-Apr-2021
19-Mar-2021
18-Apr-2021
18-Mar-2021
18-Apr-2021
18-Apr-2021
18-Mar-2021

## 2021-05-24 NOTE — ECT PRE-PROCEDURE CHECKLIST - NSPROPTRIGHTSUPPORTPHONE_GEN_A_NUR
cell                 landline 

## 2021-05-24 NOTE — BH INPATIENT PSYCHIATRY PROGRESS NOTE - NSBHANTIPSYCHOTIC_PSY_ALL_CORE
Yes...

## 2021-05-24 NOTE — BH INPATIENT PSYCHIATRY PROGRESS NOTE - NSTXPROBCONF_PSY_ALL_CORE
CONFUSION, ACUTE/CHRONIC

## 2021-05-24 NOTE — BH INPATIENT PSYCHIATRY PROGRESS NOTE - NSTXDEPRESGOAL_PSY_ALL_CORE
Attend and participate in at least 2 groups daily despite low mood/energy
Will identify 2 coping skills that assist in improving mood
Other...
Will identify 2 coping skills that assist in improving mood
Other...
Exhibit improvements in self-grooming, hygiene, sleep and appetite
Will identify 2 coping skills that assist in improving mood
Will identify thoughts and self-talk that contribute to depression
Will identify 2 coping skills that assist in improving mood
Will identify 2 coping skills that assist in improving mood
Attend and participate in at least 2 groups daily despite low mood/energy
Other...
Will identify 2 coping skills that assist in improving mood
Will identify 2 coping skills that assist in improving mood
Exhibit improvements in self-grooming, hygiene, sleep and appetite
Will identify 2 coping skills that assist in improving mood
Other...
Will identify 2 coping skills that assist in improving mood
Exhibit improvements in self-grooming, hygiene, sleep and appetite
Other...
Will identify thoughts and self-talk that contribute to depression
Will identify 2 coping skills that assist in improving mood
Will identify thoughts and self-talk that contribute to depression
Other...
Exhibit improvements in self-grooming, hygiene, sleep and appetite
Will identify thoughts and self-talk that contribute to depression
Will identify thoughts and self-talk that contribute to depression
Other...
Other...
Will identify 2 coping skills that assist in improving mood
Will identify 2 coping skills that assist in improving mood
Exhibit improvements in self-grooming, hygiene, sleep and appetite
Exhibit improvements in self-grooming, hygiene, sleep and appetite
Other...
Will identify thoughts and self-talk that contribute to depression
Will identify thoughts and self-talk that contribute to depression
Will identify 2 coping skills that assist in improving mood
Will identify 2 coping skills that assist in improving mood
Other...

## 2021-05-24 NOTE — ECT TREATMENT NOTE - NSECTROSNEGAT_PSY_ALL_CORE
Review of Systems negative/unchanged from previous exam except as noted below

## 2021-05-24 NOTE — ECT PRE-PROCEDURE CHECKLIST - NSECTCONSENT_PSY_ALL_CORE
BF acute ECT consent obtained 5/5/21  Anesthesia consent expires 8/7/21/yes
BF acute ECT consent obtained 5/5/21  Anesthesia consent expires 8/7/21/yes
ect acute	  anesth exp./yes
ECT 5/5/22  Anethesi 8/7/21/yes
BF acute ECT consent obtained 5/5/21  Anesthesia consent expires 8/7/21/yes
BF acute ECT consent obtained 5/5/21  Anesthesia consent expires 8/7/21/yes

## 2021-05-24 NOTE — BH INPATIENT PSYCHIATRY PROGRESS NOTE - NSICDXBHSECONDARYDX_PSY_ALL_CORE
Unsteadiness   R26.52  
Unsteadiness   R26.81  Frontotemporal dementia   G31.09  
Unsteadiness   R26.81  Frontotemporal dementia   G31.09  
Unsteadiness   R26.53  
Unsteadiness   R26.47  
Unsteadiness   R26.81  Frontotemporal dementia   G31.09  
Unsteadiness   R26.86  
Unsteadiness   R26.56  
Unsteadiness   R26.81  Frontotemporal dementia   G31.09  
Unsteadiness   R26.81  Frontotemporal dementia   G31.09  
Unsteadiness   R26.72  
Frontotemporal dementia   G31.09  
Unsteadiness   R26.94  
Frontotemporal dementia   G31.09  
Unsteadiness   R26.90  
Unsteadiness   R26.31  
Unsteadiness   R26.08  
Unsteadiness   R26.98  
Unsteadiness   R26.79  
Unsteadiness   R26.81  Frontotemporal dementia   G31.09  
Frontotemporal dementia   G31.09  
Unsteadiness   R26.14  
Unsteadiness   R26.81  Frontotemporal dementia   G31.09

## 2021-05-24 NOTE — BH INPATIENT PSYCHIATRY PROGRESS NOTE - NSBHCHARTREVIEWLAB_PSY_A_CORE FT
04-08    136  |  101  |  17  ----------------------------<  78  3.9   |  24  |  0.80
CBC Full  -  ( 19 May 2021 10:06 )  WBC Count : 8.83 K/uL  RBC Count : 3.58 M/uL  Hemoglobin : 10.4 g/dL  Hematocrit : 32.9 %  Platelet Count - Automated : 185 K/uL  Mean Cell Volume : 91.9 fL  Mean Cell Hemoglobin : 29.1 pg  Mean Cell Hemoglobin Concentration : 31.6 gm/dL
04-08    136  |  101  |  17  ----------------------------<  78  3.9   |  24  |  0.80
04-08    136  |  101  |  17  ----------------------------<  78  3.9   |  24  |  0.80
03-18    142  |  105  |  29<H>  ----------------------------<  119<H>  4.2   |  26  |  0.69      
CBC Full  -  ( 19 May 2021 10:06 )  WBC Count : 8.83 K/uL  RBC Count : 3.58 M/uL  Hemoglobin : 10.4 g/dL  Hematocrit : 32.9 %  Platelet Count - Automated : 185 K/uL  Mean Cell Volume : 91.9 fL  Mean Cell Hemoglobin : 29.1 pg  Mean Cell Hemoglobin Concentration : 31.6 gm/dL
04-08    136  |  101  |  17  ----------------------------<  78  3.9   |  24  |  0.80
CBC Full  -  ( 19 May 2021 10:06 )  WBC Count : 8.83 K/uL  RBC Count : 3.58 M/uL  Hemoglobin : 10.4 g/dL  Hematocrit : 32.9 %  Platelet Count - Automated : 185 K/uL  Mean Cell Volume : 91.9 fL  Mean Cell Hemoglobin : 29.1 pg  Mean Cell Hemoglobin Concentration : 31.6 gm/dL
CBC Full  -  ( 19 May 2021 10:06 )  WBC Count : 8.83 K/uL  RBC Count : 3.58 M/uL  Hemoglobin : 10.4 g/dL  Hematocrit : 32.9 %  Platelet Count - Automated : 185 K/uL  Mean Cell Volume : 91.9 fL  Mean Cell Hemoglobin : 29.1 pg  Mean Cell Hemoglobin Concentration : 31.6 gm/dL
04-08    136  |  101  |  17  ----------------------------<  78  3.9   |  24  |  0.80
03-18    142  |  105  |  29<H>  ----------------------------<  119<H>  4.2   |  26  |  0.69      
03-18    142  |  105  |  29<H>  ----------------------------<  119<H>  4.2   |  26  |  0.69      
04-08    136  |  101  |  17  ----------------------------<  78  3.9   |  24  |  0.80
03-18    142  |  105  |  29<H>  ----------------------------<  119<H>  4.2   |  26  |  0.69      
04-08    136  |  101  |  17  ----------------------------<  78  3.9   |  24  |  0.80
03-18    142  |  105  |  29<H>  ----------------------------<  119<H>  4.2   |  26  |  0.69

## 2021-05-24 NOTE — BH INPATIENT PSYCHIATRY PROGRESS NOTE - NSTXCONFGOAL_PSY_ALL_CORE
Will ask for assistance as required
Will be able to identify when to ask for assistance
Will be able to identify when to ask for assistance
Will accept reality orientation 3x a day
Will accept reality orientation 3x a day
Will ask for assistance as required
Will be able to identify when to ask for assistance
Will accept reality orientation 3x a day
Will ask for assistance as required
Will ask for assistance as required
Will be able to identify when to ask for assistance
Will ask for assistance as required
Will accept reality orientation 3x a day
Will ask for assistance as required
Will accept reality orientation 3x a day
Will ask for assistance as required
Will be able to identify when to ask for assistance
Will ask for assistance as required
Will ask for assistance as required

## 2021-05-24 NOTE — BH INPATIENT PSYCHIATRY PROGRESS NOTE - NSTXCONFDATEEST_PSY_ALL_CORE
07-Apr-2021
01-Apr-2021
07-Apr-2021
01-Apr-2021
07-Apr-2021
01-Apr-2021
01-Apr-2021
07-Apr-2021
18-Apr-2021
01-Apr-2021
01-Apr-2021
18-Apr-2021
01-Apr-2021
07-Apr-2021
01-Apr-2021
07-Apr-2021
18-Apr-2021
07-Apr-2021
18-Apr-2021
07-Apr-2021
18-Apr-2021
07-Apr-2021
01-Apr-2021
07-Apr-2021
07-Apr-2021
18-Apr-2021
18-Apr-2021
07-Apr-2021
18-Apr-2021

## 2021-05-24 NOTE — BH INPATIENT PSYCHIATRY PROGRESS NOTE - MSE UNSTRUCTURED FT
The patient appears stated age, dressed in hospital gowns, adequate grooming and hygiene. Cooperative, but gives limited answers to most questions with intermittent eye contact. No PMA/ PMR noted. The patient's speech is fluent but soft. Reported mood "OK" with constricted affect. Thought process grossly preservative. Thought content: less somatic, denies current SI/HI. No perceptual disturbance appreciated. Insight and judgment are limited. Impulse control intact at the time of exam. Alert and oriented X4

## 2021-05-24 NOTE — BH INPATIENT PSYCHIATRY PROGRESS NOTE - NSTXDEPRESINTERMD_PSY_ALL_CORE
Will continue Mirtazapine
Will continue Mirtazapine
Will continue Mirtazapine and titrate dose as needed. ECT also being considered
Will continue Mirtazapine
Will continue Mirtazapine and titrate dose as needed. ECT also being considered
Will continue Mirtazapine and titrate dose as needed. will continue ECT Mon/ Wed/ Fri
Will continue Mirtazapine
Will continue Mirtazapine and titrate dose as needed. ECT also being considered
Will continue Mirtazapine. Will evaluate need to continue ECT 
Will continue Mirtazapine
Will continue Mirtazapine and titrate dose as needed. ECT also being considered
Will continue Mirtazapine
Will continue Mirtazapine and titrate dose as needed. will continue ECT Mon/ Wed/ Fri
Will continue Mirtazapine
Will continue Mirtazapine. Will evaluate need to continue ECT 
Will continue Mirtazapine
Will continue Mirtazapine. Will evaluate need to continue ECT 
Will continue Mirtazapine
Will continue Mirtazapine
Will continue Mirtazapine and titrate dose as needed. ECT also being considered
Will continue Mirtazapine
Will continue Mirtazapine and titrate dose as needed. ECT also being considered
Will continue Mirtazapine
Will continue Mirtazapine and titrate dose as needed. will continue ECT Mon/ Wed/ Fri
Will continue Mirtazapine
Will continue Mirtazapine and titrate dose as needed. will continue ECT Mon/ Wed/ Fri

## 2021-05-24 NOTE — ECT TREATMENT NOTE - NSECTOTHERCOMMENT_PSY_ALL_CORE
H/o Right Hip replacement

## 2021-05-24 NOTE — BH INPATIENT PSYCHIATRY PROGRESS NOTE - NSTXDCOTHRDATETRGT_PSY_ALL_CORE
26-Mar-2021
19-May-2021
02-Apr-2021
02-Apr-2021
29-Apr-2021
26-May-2021
02-Apr-2021
22-Apr-2021
09-Apr-2021
22-Apr-2021
09-Apr-2021
12-May-2021
02-Apr-2021
12-May-2021
26-Mar-2021
02-Apr-2021
19-May-2021
06-May-2021
12-May-2021
29-Apr-2021
09-Apr-2021
16-Apr-2021
22-Apr-2021
09-Apr-2021
19-May-2021
02-Apr-2021
26-Mar-2021
02-Apr-2021
26-Mar-2021
12-May-2021
19-May-2021
22-Apr-2021
26-Mar-2021
26-Mar-2021
06-May-2021
06-May-2021
09-Apr-2021
26-Mar-2021
06-May-2021
19-May-2021
16-Apr-2021
29-Apr-2021
16-Apr-2021
26-May-2021
26-May-2021
29-Apr-2021
22-Apr-2021
12-May-2021
22-Apr-2021
19-May-2021

## 2021-05-24 NOTE — BH INPATIENT PSYCHIATRY PROGRESS NOTE - NSTREATMENTCERTY_PSY_ALL_CORE

## 2021-05-24 NOTE — BH INPATIENT PSYCHIATRY PROGRESS NOTE - NSBHCONSDANGERSELF_PSY_A_CORE
unable to care for self
unable to care for self
suicidal behavior
unable to care for self
suicidal behavior
unable to care for self
suicidal behavior
unable to care for self
unable to care for self
suicidal behavior
unable to care for self
suicidal behavior
suicidal behavior
unable to care for self
suicidal behavior
unable to care for self

## 2021-05-24 NOTE — BH INPATIENT PSYCHIATRY PROGRESS NOTE - NSTXFALLGOAL_PSY_ALL_CORE
Verbalize understanding of fall prevention interventions
Call for assistance before getting out of bed or chair
Ask for assistance when getting out of bed/chair and upon ambulation
Call for assistance before getting out of bed or chair
Verbalize understanding of fall prevention interventions
Ask for assistance when getting out of bed/chair and upon ambulation
Call for assistance before getting out of bed or chair
Ask for assistance when getting out of bed/chair and upon ambulation
Ask for assistance when getting out of bed/chair and upon ambulation
Call for assistance before getting out of bed or chair
Verbalize understanding of fall prevention interventions
Ask for assistance when getting out of bed/chair and upon ambulation
Ask for assistance when getting out of bed/chair and upon ambulation
Call for assistance before getting out of bed or chair
Ask for assistance when getting out of bed/chair and upon ambulation
Verbalize understanding of fall prevention interventions
Call for assistance before getting out of bed or chair
Ask for assistance when getting out of bed/chair and upon ambulation
Call for assistance before getting out of bed or chair
Ask for assistance when getting out of bed/chair and upon ambulation
Verbalize understanding of fall prevention interventions
Ask for assistance when getting out of bed/chair and upon ambulation
Call for assistance before getting out of bed or chair
Ask for assistance when getting out of bed/chair and upon ambulation
Call for assistance before getting out of bed or chair
Ask for assistance when getting out of bed/chair and upon ambulation
Call for assistance before getting out of bed or chair
Ask for assistance when getting out of bed/chair and upon ambulation
Verbalize understanding of fall prevention interventions
Ask for assistance when getting out of bed/chair and upon ambulation
Ask for assistance when getting out of bed/chair and upon ambulation
Call for assistance before getting out of bed or chair
Verbalize understanding of fall prevention interventions
Call for assistance before getting out of bed or chair
Ask for assistance when getting out of bed/chair and upon ambulation
Call for assistance before getting out of bed or chair

## 2021-05-24 NOTE — BH INPATIENT PSYCHIATRY PROGRESS NOTE - NSICDXBHPRIMARYDX_PSY_ALL_CORE
Frontotemporal dementia   G31.09  
Major depressive disorder   F32.9  
Frontotemporal dementia   G31.09  
Major depressive disorder   F32.9  
Frontotemporal dementia   G31.09  
Major depressive disorder   F32.9  
Frontotemporal dementia   G31.09  
Frontotemporal dementia   G31.09  
Major depressive disorder   F32.9  
Major depressive disorder   F32.9  
Frontotemporal dementia   G31.09  
Frontotemporal dementia   G31.09  
Major depressive disorder   F32.9  
Frontotemporal dementia   G31.09  
Frontotemporal dementia   G31.09  
Major depressive disorder   F32.9  
Frontotemporal dementia   G31.09  
Major depressive disorder   F32.9  
Major depressive disorder   F32.9  
Frontotemporal dementia   G31.09  
Major depressive disorder   F32.9  
Frontotemporal dementia   G31.09  
Major depressive disorder   F32.9  
Frontotemporal dementia   G31.09  
Major depressive disorder   F32.9  
Frontotemporal dementia   G31.09  
Major depressive disorder   F32.9  
Major depressive disorder   F32.9

## 2021-05-24 NOTE — ECT PRE-PROCEDURE CHECKLIST - NSPTSENTTO_PSY_ALL_CORE
procedural room

## 2021-05-24 NOTE — ECT TREATMENT NOTE - NSECTPOSTTXCOMMENTS_PSY_ALL_CORE
Suggest INCREASED ENERGY AT NEXT TREATMENT
SUGGEST INCREASED ENERGY AT NEXT TX FOR TREND TO SHORTER SEIZURE DURATION 
To consider increasing energy and hyperventilation next time. The dose of induction agent was reduced today. 
SUGGEST INCREASED ENERGY AT NEXT TX FOR TREND TO SHORTER SEIZURE DURATION
Dose was increased to 85% for this session

## 2021-05-26 NOTE — SOCIAL WORK POST DISCHARGE FOLLOW UP NOTE - NSBHSWFOLLOWUP_PSY_ALL_CORE_FT
left message  return message to [facility] who was inquiring of dc plan    advised sw faxed dc information yesterday to  [phone]. advised  short notice - accepted afternoon of and dc same day Monday May 24 to [facility] with   plan LTC.   SW is not sure but patient might have been accepted  Sub acute to start;  as sw noticed  [facility] auth was sub acute but plan is long term care.  Patient requires 24/7 care.  provided contact information at the admission office .. [place] -  [phone] for [facility] to follow up as they require.

## 2021-07-12 NOTE — ED BEHAVIORAL HEALTH ASSESSMENT NOTE - TELEPSYCHIATRY?
Quality 130: Documentation Of Current Medications In The Medical Record: Current Medications Documented
Quality 431: Preventive Care And Screening: Unhealthy Alcohol Use - Screening: Patient screened for unhealthy alcohol use using a single question and scores less than 2 times per year
Quality 226: Preventive Care And Screening: Tobacco Use: Screening And Cessation Intervention: Patient screened for tobacco use and is an ex/non-smoker
Detail Level: Detailed
No

## 2022-04-12 NOTE — ED BEHAVIORAL HEALTH ASSESSMENT NOTE - PSYCHIATRIC ISSUES AND PLAN (INCLUDE STANDING AND PRN MEDICATION)
Increase Remeron 22.5mg mg HS, . For insomnia : Trazodone 50mg qhs prn . For agitation: Zyprexa 2.5mg po/im q6hrs prn.
No

## 2022-07-16 NOTE — ED ADULT TRIAGE NOTE - INTERNATIONAL TRAVEL
Pt here for IV Rocephin. Appeared to tolerate infusion, no signs and symptoms of adverse reaction noted. Discharged home ambulating independently.     EOT-7/26/22  F/U Pako: 7/21 No

## 2022-10-31 NOTE — BH INPATIENT PSYCHIATRY ASSESSMENT NOTE - NSTXDEPRESPROGRES_PSY_ALL_CORE
----- Message from Maryana Jackson sent at 10/31/2022  9:53 AM CDT -----  Regarding: orders  Name of Who is Calling: ANDRE SOLO [[id number]]      What is the request in detail: mammogram orders      Can the clinic reply by [facility]: no      [facility] [phone]                                      
No Change

## 2023-07-08 NOTE — BH INPATIENT PSYCHIATRY PROGRESS NOTE - NSTXDEPRESDATETRGT_PSY_ALL_CORE
26-Mar-2021
09-Apr-2021
07-May-2021
31-Mar-2021
21-Apr-2021
31-Mar-2021
26-Mar-2021
07-May-2021
18-May-2021
06-Apr-2021
20-May-2021
25-May-2021
fair plus
21-Apr-2021
09-Apr-2021
14-Apr-2021
14-Apr-2021
25-May-2021
28-Apr-2021
09-Apr-2021
21-Apr-2021
31-Mar-2021
13-May-2021
13-May-2021
31-Mar-2021
27-May-2021
06-Apr-2021
27-May-2021
09-Apr-2021
21-Apr-2021
21-Apr-2021
06-Apr-2021
28-Apr-2021
20-May-2021
30-Apr-2021
31-Mar-2021
26-Mar-2021
31-Mar-2021
18-May-2021
30-Apr-2021
30-Apr-2021
20-May-2021
26-Mar-2021
07-May-2021
28-Apr-2021
14-Apr-2021
13-May-2021
07-May-2021
30-Apr-2021
30-Apr-2021
31-Mar-2021

## 2024-01-02 NOTE — BH INPATIENT PSYCHIATRY PROGRESS NOTE - NSBHCHARTREVIEWVS_PSY_A_CORE FT
INTERVENTIONAL CARDIOLOGY FOCUS NOTE 1/2/2024    Npo at midnight for [facility]& [facility], [facility] & BAV with Dr Gutierrez 1/3/2024.  D/w unit fellow, orders placed.    SHERINE Myles / Dr YAMILKA Gutierrez  1/2/2024          Vital Signs Last 24 Hrs  T(C): 36.4 (21 Mar 2021 05:39), Max: 36.7 (20 Mar 2021 20:19)  T(F): 97.6 (21 Mar 2021 05:39), Max: 98.1 (20 Mar 2021 20:19)  HR: --  BP: --  BP(mean): --  RR: 18 (20 Mar 2021 20:19) (18 - 18)  SpO2: --

## 2024-03-21 NOTE — ECT TREATMENT NOTE - NSECTFOCPECOMPLET_PSY_ALL_CORE
Patient inquiring if Dr. Munoz could give her her test results since Dr. Martins is out of the office.  Please advise.  
Focused Physical Exam Completed

## 2024-03-26 NOTE — ECT TREATMENT NOTE - TEMPERATURE IN FAHRENHEIT (DEGREES F)
[facility]  [address]  [address]  [facility] [address]  Dept: [phone]  Dept Fax: [phone]    Jarad Yo is a 54 y.o. male who is a Established patient, who presents today for his medical conditions/complaints as noted below:  Chief Complaint   Patient presents with    Follow-up     Back pain         HPI:     He is here today to follow-up on his back pain.  He was seen in ER recently for acute low back pain and then followed up with orthopedics outpatient.  His x-ray in the ER showed degenerative changes and he had a MRI ordered by orthopedics on his follow-up visit. He was also advised to follow-up with physical therapy and he has an appointment scheduled next week. He states that his pain is not getting any better and he has been using a cane to move around.  He states that orthopedics did not give him any pain medications and he ran out of the prescriptions that were given to him in the ER and he is requesting refills.          Hemoglobin A1C (%)   Date Value   06/06/2023 5.5   08/26/2022 5.5   06/11/2021 5.3             ( goal A1Cis < 7)   No components found for: \"LABMICR\"  LDL Calculated (mg/dL)   Date Value   06/06/2023 58   08/26/2022 63   06/11/2021 53       (goal LDL is <100)   AST (U/L)   Date Value   06/06/2023 25     ALT (U/L)   Date Value   06/06/2023 15     BUN (mg/dL)   Date Value   06/06/2023 10     BP Readings from Last 3 Encounters:   03/26/24 124/80   03/15/24 123/80   01/03/24 116/80          (goal 120/80)    Past Medical History:   Diagnosis Date    Alcoholism /alcohol abuse 08/21/2014    Allergic rhinitis, cause unspecified 08/07/2014    Asthma Unknown    Cirrhosis with alcoholism (HCC) 08/21/2014    End-stage liver disease (HCC) 08/21/2014    Esophageal reflux 08/07/2014    Essential hypertension, benign 08/07/2014    Obesity 1/1/1990    Long time but trying to lose    Portal hypertension with esophageal varices (HCC) 08/21/2014      Past 
97.5
97.9
97.7
98
98.8
98.5
97.5

## 2024-11-27 NOTE — ECT PRE-PROCEDURE CHECKLIST - HEART RATE (BEATS/MIN)
Writer called [facility] to get an update on discharge date for pt and spoke to nurse Sin.    There is no discharge date set up for this pt yet. Writer asked nurse to call our office with that info, nurse agreeable to pass info to ARETHA to update our office.    
59
75
76
59
72
55